# Patient Record
Sex: FEMALE | Race: WHITE | Employment: OTHER | URBAN - METROPOLITAN AREA
[De-identification: names, ages, dates, MRNs, and addresses within clinical notes are randomized per-mention and may not be internally consistent; named-entity substitution may affect disease eponyms.]

---

## 2019-09-25 ENCOUNTER — APPOINTMENT (OUTPATIENT)
Dept: GENERAL RADIOLOGY | Age: 74
DRG: 418 | End: 2019-09-25
Payer: MEDICARE

## 2019-09-25 ENCOUNTER — APPOINTMENT (OUTPATIENT)
Dept: CT IMAGING | Age: 74
DRG: 418 | End: 2019-09-25
Payer: MEDICARE

## 2019-09-25 ENCOUNTER — HOSPITAL ENCOUNTER (INPATIENT)
Age: 74
LOS: 9 days | Discharge: NURSING, REHAB OR LONG-TERM CARE | DRG: 418 | End: 2019-10-04
Attending: EMERGENCY MEDICINE | Admitting: INTERNAL MEDICINE
Payer: MEDICARE

## 2019-09-25 DIAGNOSIS — N17.9 ACUTE KIDNEY INJURY (HCC): ICD-10-CM

## 2019-09-25 DIAGNOSIS — R52 PAIN: ICD-10-CM

## 2019-09-25 DIAGNOSIS — R00.0 TACHYCARDIA: ICD-10-CM

## 2019-09-25 DIAGNOSIS — K56.7 ILEUS (HCC): ICD-10-CM

## 2019-09-25 DIAGNOSIS — R10.11 ABDOMINAL PAIN, RIGHT UPPER QUADRANT: ICD-10-CM

## 2019-09-25 DIAGNOSIS — R77.8 ELEVATED TROPONIN: ICD-10-CM

## 2019-09-25 DIAGNOSIS — I48.91 NEW ONSET ATRIAL FIBRILLATION (HCC): ICD-10-CM

## 2019-09-25 DIAGNOSIS — E87.1 HYPONATREMIA: ICD-10-CM

## 2019-09-25 DIAGNOSIS — R79.89 ELEVATED BRAIN NATRIURETIC PEPTIDE (BNP) LEVEL: Primary | ICD-10-CM

## 2019-09-25 DIAGNOSIS — I48.91 RAPID ATRIAL FIBRILLATION (HCC): ICD-10-CM

## 2019-09-25 LAB
ALBUMIN SERPL-MCNC: 3 GM/DL (ref 3.4–5)
ALP BLD-CCNC: 173 IU/L (ref 40–129)
ALT SERPL-CCNC: 40 U/L (ref 10–40)
ANION GAP SERPL CALCULATED.3IONS-SCNC: 16 MMOL/L (ref 4–16)
AST SERPL-CCNC: 20 IU/L (ref 15–37)
BACTERIA: NEGATIVE /HPF
BASOPHILS ABSOLUTE: 0 K/CU MM
BASOPHILS RELATIVE PERCENT: 0.2 % (ref 0–1)
BILIRUB SERPL-MCNC: 0.8 MG/DL (ref 0–1)
BILIRUBIN URINE: NEGATIVE MG/DL
BLOOD, URINE: ABNORMAL
BUN BLDV-MCNC: 52 MG/DL (ref 6–23)
CALCIUM SERPL-MCNC: 8.4 MG/DL (ref 8.3–10.6)
CHLORIDE BLD-SCNC: 88 MMOL/L (ref 99–110)
CLARITY: ABNORMAL
CO2: 19 MMOL/L (ref 21–32)
COLOR: ABNORMAL
CREAT SERPL-MCNC: 1.8 MG/DL (ref 0.6–1.1)
DIFFERENTIAL TYPE: ABNORMAL
EOSINOPHILS ABSOLUTE: 0 K/CU MM
EOSINOPHILS RELATIVE PERCENT: 0.1 % (ref 0–3)
GFR AFRICAN AMERICAN: 33 ML/MIN/1.73M2
GFR NON-AFRICAN AMERICAN: 28 ML/MIN/1.73M2
GLUCOSE BLD-MCNC: 56 MG/DL (ref 70–99)
GLUCOSE BLD-MCNC: 69 MG/DL (ref 70–99)
GLUCOSE, URINE: NEGATIVE MG/DL
HCT VFR BLD CALC: 31.3 % (ref 37–47)
HEMOGLOBIN: 9.6 GM/DL (ref 12.5–16)
HYALINE CASTS: 5 /LPF
IMMATURE NEUTROPHIL %: 0.5 % (ref 0–0.43)
KETONES, URINE: NEGATIVE MG/DL
LACTATE: 1.2 MMOL/L (ref 0.4–2)
LEUKOCYTE ESTERASE, URINE: ABNORMAL
LYMPHOCYTES ABSOLUTE: 0.7 K/CU MM
LYMPHOCYTES RELATIVE PERCENT: 4.5 % (ref 24–44)
MCH RBC QN AUTO: 23.8 PG (ref 27–31)
MCHC RBC AUTO-ENTMCNC: 30.7 % (ref 32–36)
MCV RBC AUTO: 77.5 FL (ref 78–100)
MONOCYTES ABSOLUTE: 0.8 K/CU MM
MONOCYTES RELATIVE PERCENT: 4.9 % (ref 0–4)
MUCUS: ABNORMAL HPF
NITRITE URINE, QUANTITATIVE: NEGATIVE
NUCLEATED RBC %: 0 %
PDW BLD-RTO: 17.2 % (ref 11.7–14.9)
PH, URINE: 5 (ref 5–8)
PLATELET # BLD: 474 K/CU MM (ref 140–440)
PMV BLD AUTO: 9.1 FL (ref 7.5–11.1)
POTASSIUM SERPL-SCNC: 3.6 MMOL/L (ref 3.5–5.1)
PRO-BNP: 4398 PG/ML
PROTEIN UA: NEGATIVE MG/DL
RBC # BLD: 4.04 M/CU MM (ref 4.2–5.4)
RBC URINE: 6 /HPF (ref 0–6)
SEGMENTED NEUTROPHILS ABSOLUTE COUNT: 14.5 K/CU MM
SEGMENTED NEUTROPHILS RELATIVE PERCENT: 89.8 % (ref 36–66)
SODIUM BLD-SCNC: 123 MMOL/L (ref 135–145)
SPECIFIC GRAVITY UA: 1.02 (ref 1–1.03)
SQUAMOUS EPITHELIAL: 3 /HPF
TOTAL IMMATURE NEUTOROPHIL: 0.08 K/CU MM
TOTAL NUCLEATED RBC: 0 K/CU MM
TOTAL PROTEIN: 7.2 GM/DL (ref 6.4–8.2)
TRICHOMONAS: ABNORMAL /HPF
TROPONIN T: 0.02 NG/ML
UROBILINOGEN, URINE: NORMAL MG/DL (ref 0.2–1)
WBC # BLD: 16.2 K/CU MM (ref 4–10.5)
WBC CLUMP: ABNORMAL /HPF
WBC UA: 40 /HPF (ref 0–5)

## 2019-09-25 PROCEDURE — 83880 ASSAY OF NATRIURETIC PEPTIDE: CPT

## 2019-09-25 PROCEDURE — 2500000003 HC RX 250 WO HCPCS: Performed by: EMERGENCY MEDICINE

## 2019-09-25 PROCEDURE — 36415 COLL VENOUS BLD VENIPUNCTURE: CPT

## 2019-09-25 PROCEDURE — 85025 COMPLETE CBC W/AUTO DIFF WBC: CPT

## 2019-09-25 PROCEDURE — 2140000000 HC CCU INTERMEDIATE R&B

## 2019-09-25 PROCEDURE — 84484 ASSAY OF TROPONIN QUANT: CPT

## 2019-09-25 PROCEDURE — 87040 BLOOD CULTURE FOR BACTERIA: CPT

## 2019-09-25 PROCEDURE — 99291 CRITICAL CARE FIRST HOUR: CPT

## 2019-09-25 PROCEDURE — 96365 THER/PROPH/DIAG IV INF INIT: CPT

## 2019-09-25 PROCEDURE — 81001 URINALYSIS AUTO W/SCOPE: CPT

## 2019-09-25 PROCEDURE — 96375 TX/PRO/DX INJ NEW DRUG ADDON: CPT

## 2019-09-25 PROCEDURE — 93005 ELECTROCARDIOGRAM TRACING: CPT | Performed by: EMERGENCY MEDICINE

## 2019-09-25 PROCEDURE — 2580000003 HC RX 258: Performed by: EMERGENCY MEDICINE

## 2019-09-25 PROCEDURE — 80053 COMPREHEN METABOLIC PANEL: CPT

## 2019-09-25 PROCEDURE — 71250 CT THORAX DX C-: CPT

## 2019-09-25 PROCEDURE — 83605 ASSAY OF LACTIC ACID: CPT

## 2019-09-25 PROCEDURE — 2580000003 HC RX 258: Performed by: INTERNAL MEDICINE

## 2019-09-25 PROCEDURE — 71045 X-RAY EXAM CHEST 1 VIEW: CPT

## 2019-09-25 PROCEDURE — 6360000002 HC RX W HCPCS: Performed by: EMERGENCY MEDICINE

## 2019-09-25 PROCEDURE — 74176 CT ABD & PELVIS W/O CONTRAST: CPT

## 2019-09-25 RX ORDER — DILTIAZEM HYDROCHLORIDE 5 MG/ML
10 INJECTION INTRAVENOUS ONCE
Status: DISCONTINUED | OUTPATIENT
Start: 2019-09-25 | End: 2019-09-25 | Stop reason: SDUPTHER

## 2019-09-25 RX ORDER — FENTANYL CITRATE 50 UG/ML
25 INJECTION, SOLUTION INTRAMUSCULAR; INTRAVENOUS ONCE
Status: COMPLETED | OUTPATIENT
Start: 2019-09-25 | End: 2019-09-25

## 2019-09-25 RX ORDER — SODIUM CHLORIDE 0.9 % (FLUSH) 0.9 %
10 SYRINGE (ML) INJECTION EVERY 12 HOURS SCHEDULED
Status: DISCONTINUED | OUTPATIENT
Start: 2019-09-25 | End: 2019-10-05 | Stop reason: HOSPADM

## 2019-09-25 RX ORDER — 0.9 % SODIUM CHLORIDE 0.9 %
500 INTRAVENOUS SOLUTION INTRAVENOUS ONCE
Status: COMPLETED | OUTPATIENT
Start: 2019-09-25 | End: 2019-09-25

## 2019-09-25 RX ORDER — ACETAMINOPHEN 325 MG/1
650 TABLET ORAL EVERY 4 HOURS PRN
Status: DISCONTINUED | OUTPATIENT
Start: 2019-09-25 | End: 2019-10-05 | Stop reason: HOSPADM

## 2019-09-25 RX ORDER — METOPROLOL SUCCINATE 25 MG/1
25 TABLET, EXTENDED RELEASE ORAL DAILY
Status: DISCONTINUED | OUTPATIENT
Start: 2019-09-26 | End: 2019-10-05 | Stop reason: HOSPADM

## 2019-09-25 RX ORDER — HYDROMORPHONE HCL 110MG/55ML
1 PATIENT CONTROLLED ANALGESIA SYRINGE INTRAVENOUS EVERY 4 HOURS PRN
Status: DISCONTINUED | OUTPATIENT
Start: 2019-09-25 | End: 2019-09-28

## 2019-09-25 RX ORDER — SODIUM CHLORIDE 0.9 % (FLUSH) 0.9 %
10 SYRINGE (ML) INJECTION PRN
Status: DISCONTINUED | OUTPATIENT
Start: 2019-09-25 | End: 2019-10-05 | Stop reason: HOSPADM

## 2019-09-25 RX ORDER — SODIUM CHLORIDE 9 MG/ML
INJECTION, SOLUTION INTRAVENOUS CONTINUOUS
Status: DISCONTINUED | OUTPATIENT
Start: 2019-09-25 | End: 2019-10-02

## 2019-09-25 RX ORDER — DILTIAZEM HYDROCHLORIDE 5 MG/ML
10 INJECTION INTRAVENOUS ONCE
Status: COMPLETED | OUTPATIENT
Start: 2019-09-25 | End: 2019-09-25

## 2019-09-25 RX ADMIN — DILTIAZEM HYDROCHLORIDE 10 MG: 5 INJECTION INTRAVENOUS at 21:24

## 2019-09-25 RX ADMIN — PIPERACILLIN AND TAZOBACTAM 3.38 G: 3; .375 INJECTION, POWDER, LYOPHILIZED, FOR SOLUTION INTRAVENOUS at 21:00

## 2019-09-25 RX ADMIN — SODIUM CHLORIDE: 9 INJECTION, SOLUTION INTRAVENOUS at 23:33

## 2019-09-25 RX ADMIN — SODIUM CHLORIDE 500 ML: 9 INJECTION, SOLUTION INTRAVENOUS at 20:02

## 2019-09-25 RX ADMIN — DILTIAZEM HYDROCHLORIDE 5 MG/HR: 5 INJECTION INTRAVENOUS at 21:24

## 2019-09-25 RX ADMIN — FENTANYL CITRATE 25 MCG: 50 INJECTION INTRAMUSCULAR; INTRAVENOUS at 21:00

## 2019-09-25 ASSESSMENT — PAIN DESCRIPTION - PAIN TYPE: TYPE: ACUTE PAIN

## 2019-09-25 ASSESSMENT — PAIN SCALES - GENERAL
PAINLEVEL_OUTOF10: 8
PAINLEVEL_OUTOF10: 8

## 2019-09-25 ASSESSMENT — PAIN DESCRIPTION - ONSET: ONSET: ON-GOING

## 2019-09-25 ASSESSMENT — PAIN DESCRIPTION - PROGRESSION: CLINICAL_PROGRESSION: NOT CHANGED

## 2019-09-25 ASSESSMENT — PAIN DESCRIPTION - FREQUENCY: FREQUENCY: CONTINUOUS

## 2019-09-25 ASSESSMENT — PAIN DESCRIPTION - DESCRIPTORS: DESCRIPTORS: SHARP

## 2019-09-25 ASSESSMENT — PAIN DESCRIPTION - ORIENTATION: ORIENTATION: RIGHT

## 2019-09-25 ASSESSMENT — PAIN DESCRIPTION - LOCATION: LOCATION: ABDOMEN;FLANK

## 2019-09-26 ENCOUNTER — APPOINTMENT (OUTPATIENT)
Dept: NUCLEAR MEDICINE | Age: 74
DRG: 418 | End: 2019-09-26
Payer: MEDICARE

## 2019-09-26 ENCOUNTER — APPOINTMENT (OUTPATIENT)
Dept: ULTRASOUND IMAGING | Age: 74
DRG: 418 | End: 2019-09-26
Payer: MEDICARE

## 2019-09-26 LAB
ALBUMIN SERPL-MCNC: 2.8 GM/DL (ref 3.4–5)
ALP BLD-CCNC: 157 IU/L (ref 40–128)
ALT SERPL-CCNC: 29 U/L (ref 10–40)
AMYLASE: 31 U/L (ref 25–115)
ANION GAP SERPL CALCULATED.3IONS-SCNC: 14 MMOL/L (ref 4–16)
AST SERPL-CCNC: 14 IU/L (ref 15–37)
BILIRUB SERPL-MCNC: 0.7 MG/DL (ref 0–1)
BUN BLDV-MCNC: 55 MG/DL (ref 6–23)
CALCIUM SERPL-MCNC: 8.2 MG/DL (ref 8.3–10.6)
CHLORIDE BLD-SCNC: 94 MMOL/L (ref 99–110)
CO2: 21 MMOL/L (ref 21–32)
CREAT SERPL-MCNC: 1.9 MG/DL (ref 0.6–1.1)
GFR AFRICAN AMERICAN: 31 ML/MIN/1.73M2
GFR NON-AFRICAN AMERICAN: 26 ML/MIN/1.73M2
GLUCOSE BLD-MCNC: 86 MG/DL (ref 70–99)
GLUCOSE BLD-MCNC: 88 MG/DL (ref 70–99)
GLUCOSE BLD-MCNC: 93 MG/DL (ref 70–99)
GLUCOSE BLD-MCNC: 97 MG/DL (ref 70–99)
HCT VFR BLD CALC: 27.8 % (ref 37–47)
HEMOGLOBIN: 8.4 GM/DL (ref 12.5–16)
LIPASE: 24 IU/L (ref 13–60)
LV EF: 58 %
LV EF: 60 %
LVEF MODALITY: NORMAL
LVEF MODALITY: NORMAL
MCH RBC QN AUTO: 23.7 PG (ref 27–31)
MCHC RBC AUTO-ENTMCNC: 30.2 % (ref 32–36)
MCV RBC AUTO: 78.5 FL (ref 78–100)
PDW BLD-RTO: 17.2 % (ref 11.7–14.9)
PLATELET # BLD: 439 K/CU MM (ref 140–440)
PMV BLD AUTO: 9 FL (ref 7.5–11.1)
POTASSIUM SERPL-SCNC: 4.7 MMOL/L (ref 3.5–5.1)
RBC # BLD: 3.54 M/CU MM (ref 4.2–5.4)
SODIUM BLD-SCNC: 129 MMOL/L (ref 135–145)
T4 FREE: 0.87 NG/DL (ref 0.9–1.8)
TOTAL PROTEIN: 5.8 GM/DL (ref 6.4–8.2)
TROPONIN T: 0.02 NG/ML
TSH HIGH SENSITIVITY: 9.96 UIU/ML (ref 0.27–4.2)
WBC # BLD: 15.2 K/CU MM (ref 4–10.5)

## 2019-09-26 PROCEDURE — 80053 COMPREHEN METABOLIC PANEL: CPT

## 2019-09-26 PROCEDURE — 36415 COLL VENOUS BLD VENIPUNCTURE: CPT

## 2019-09-26 PROCEDURE — 3430000000 HC RX DIAGNOSTIC RADIOPHARMACEUTICAL: Performed by: INTERNAL MEDICINE

## 2019-09-26 PROCEDURE — 85027 COMPLETE CBC AUTOMATED: CPT

## 2019-09-26 PROCEDURE — 51702 INSERT TEMP BLADDER CATH: CPT

## 2019-09-26 PROCEDURE — 84439 ASSAY OF FREE THYROXINE: CPT

## 2019-09-26 PROCEDURE — 6360000002 HC RX W HCPCS: Performed by: INTERNAL MEDICINE

## 2019-09-26 PROCEDURE — 93010 ELECTROCARDIOGRAM REPORT: CPT | Performed by: INTERNAL MEDICINE

## 2019-09-26 PROCEDURE — 93017 CV STRESS TEST TRACING ONLY: CPT

## 2019-09-26 PROCEDURE — 83690 ASSAY OF LIPASE: CPT

## 2019-09-26 PROCEDURE — 93306 TTE W/DOPPLER COMPLETE: CPT

## 2019-09-26 PROCEDURE — 84443 ASSAY THYROID STIM HORMONE: CPT

## 2019-09-26 PROCEDURE — A9500 TC99M SESTAMIBI: HCPCS | Performed by: INTERNAL MEDICINE

## 2019-09-26 PROCEDURE — 2140000000 HC CCU INTERMEDIATE R&B

## 2019-09-26 PROCEDURE — 84484 ASSAY OF TROPONIN QUANT: CPT

## 2019-09-26 PROCEDURE — 82962 GLUCOSE BLOOD TEST: CPT

## 2019-09-26 PROCEDURE — 2580000003 HC RX 258: Performed by: INTERNAL MEDICINE

## 2019-09-26 PROCEDURE — 6370000000 HC RX 637 (ALT 250 FOR IP): Performed by: INTERNAL MEDICINE

## 2019-09-26 PROCEDURE — 82150 ASSAY OF AMYLASE: CPT

## 2019-09-26 PROCEDURE — 78452 HT MUSCLE IMAGE SPECT MULT: CPT

## 2019-09-26 PROCEDURE — 76705 ECHO EXAM OF ABDOMEN: CPT

## 2019-09-26 PROCEDURE — 99222 1ST HOSP IP/OBS MODERATE 55: CPT | Performed by: INTERNAL MEDICINE

## 2019-09-26 RX ORDER — NAPROXEN 500 MG/1
500 TABLET ORAL 2 TIMES DAILY
Status: ON HOLD | COMMUNITY
End: 2019-10-04 | Stop reason: HOSPADM

## 2019-09-26 RX ORDER — NICOTINE POLACRILEX 4 MG
15 LOZENGE BUCCAL PRN
Status: DISCONTINUED | OUTPATIENT
Start: 2019-09-26 | End: 2019-09-28 | Stop reason: SDUPTHER

## 2019-09-26 RX ORDER — DIGOXIN 0.25 MG/ML
250 INJECTION INTRAMUSCULAR; INTRAVENOUS EVERY 6 HOURS
Status: DISPENSED | OUTPATIENT
Start: 2019-09-26 | End: 2019-09-27

## 2019-09-26 RX ORDER — PRAVASTATIN SODIUM 40 MG
40 TABLET ORAL DAILY
COMMUNITY
End: 2021-07-12 | Stop reason: ALTCHOICE

## 2019-09-26 RX ORDER — FERROUS SULFATE 325(65) MG
325 TABLET ORAL
COMMUNITY

## 2019-09-26 RX ORDER — DEXTROSE MONOHYDRATE 50 MG/ML
100 INJECTION, SOLUTION INTRAVENOUS PRN
Status: DISCONTINUED | OUTPATIENT
Start: 2019-09-26 | End: 2019-09-28 | Stop reason: SDUPTHER

## 2019-09-26 RX ORDER — DEXTROSE MONOHYDRATE 25 G/50ML
12.5 INJECTION, SOLUTION INTRAVENOUS PRN
Status: DISCONTINUED | OUTPATIENT
Start: 2019-09-26 | End: 2019-09-28 | Stop reason: SDUPTHER

## 2019-09-26 RX ADMIN — DIGOXIN 250 MCG: 0.25 INJECTION INTRAMUSCULAR; INTRAVENOUS at 23:58

## 2019-09-26 RX ADMIN — PIPERACILLIN AND TAZOBACTAM 3.38 G: 3; .375 INJECTION, POWDER, LYOPHILIZED, FOR SOLUTION INTRAVENOUS at 03:54

## 2019-09-26 RX ADMIN — Medication 30 MILLICURIE: at 11:45

## 2019-09-26 RX ADMIN — HYDROMORPHONE HYDROCHLORIDE 1 MG: 2 INJECTION INTRAMUSCULAR; INTRAVENOUS; SUBCUTANEOUS at 11:59

## 2019-09-26 RX ADMIN — SODIUM CHLORIDE: 9 INJECTION, SOLUTION INTRAVENOUS at 22:55

## 2019-09-26 RX ADMIN — DIGOXIN 250 MCG: 0.25 INJECTION INTRAMUSCULAR; INTRAVENOUS at 15:08

## 2019-09-26 RX ADMIN — METOPROLOL SUCCINATE 25 MG: 25 TABLET, EXTENDED RELEASE ORAL at 13:51

## 2019-09-26 RX ADMIN — Medication 10 MILLICURIE: at 10:35

## 2019-09-26 RX ADMIN — ENOXAPARIN SODIUM 40 MG: 40 INJECTION SUBCUTANEOUS at 13:51

## 2019-09-26 RX ADMIN — REGADENOSON 0.4 MG: 0.08 INJECTION, SOLUTION INTRAVENOUS at 11:41

## 2019-09-26 RX ADMIN — PIPERACILLIN AND TAZOBACTAM 3.38 G: 3; .375 INJECTION, POWDER, LYOPHILIZED, FOR SOLUTION INTRAVENOUS at 22:47

## 2019-09-26 RX ADMIN — HYDROMORPHONE HYDROCHLORIDE 1 MG: 2 INJECTION INTRAMUSCULAR; INTRAVENOUS; SUBCUTANEOUS at 22:54

## 2019-09-26 RX ADMIN — PIPERACILLIN AND TAZOBACTAM 3.38 G: 3; .375 INJECTION, POWDER, LYOPHILIZED, FOR SOLUTION INTRAVENOUS at 13:51

## 2019-09-26 RX ADMIN — HYDROMORPHONE HYDROCHLORIDE 1 MG: 2 INJECTION INTRAMUSCULAR; INTRAVENOUS; SUBCUTANEOUS at 02:30

## 2019-09-26 ASSESSMENT — PAIN DESCRIPTION - ONSET: ONSET: ON-GOING

## 2019-09-26 ASSESSMENT — PAIN DESCRIPTION - FREQUENCY: FREQUENCY: CONTINUOUS

## 2019-09-26 ASSESSMENT — PAIN DESCRIPTION - ORIENTATION: ORIENTATION: RIGHT

## 2019-09-26 ASSESSMENT — PAIN SCALES - GENERAL
PAINLEVEL_OUTOF10: 8
PAINLEVEL_OUTOF10: 5
PAINLEVEL_OUTOF10: 8
PAINLEVEL_OUTOF10: 8

## 2019-09-26 ASSESSMENT — PAIN DESCRIPTION - LOCATION: LOCATION: ABDOMEN;FLANK

## 2019-09-26 ASSESSMENT — PAIN DESCRIPTION - DESCRIPTORS: DESCRIPTORS: SHARP

## 2019-09-26 ASSESSMENT — PAIN DESCRIPTION - PAIN TYPE: TYPE: ACUTE PAIN

## 2019-09-27 ENCOUNTER — ANESTHESIA EVENT (OUTPATIENT)
Dept: OPERATING ROOM | Age: 74
DRG: 418 | End: 2019-09-27
Payer: MEDICARE

## 2019-09-27 LAB
ALBUMIN SERPL-MCNC: 2.5 GM/DL (ref 3.4–5)
ALP BLD-CCNC: 158 IU/L (ref 40–128)
ALT SERPL-CCNC: 22 U/L (ref 10–40)
ANION GAP SERPL CALCULATED.3IONS-SCNC: 13 MMOL/L (ref 4–16)
AST SERPL-CCNC: 17 IU/L (ref 15–37)
BILIRUB SERPL-MCNC: 0.5 MG/DL (ref 0–1)
BUN BLDV-MCNC: 50 MG/DL (ref 6–23)
CALCIUM SERPL-MCNC: 7.8 MG/DL (ref 8.3–10.6)
CHLORIDE BLD-SCNC: 99 MMOL/L (ref 99–110)
CO2: 18 MMOL/L (ref 21–32)
CREAT SERPL-MCNC: 1.7 MG/DL (ref 0.6–1.1)
GFR AFRICAN AMERICAN: 36 ML/MIN/1.73M2
GFR NON-AFRICAN AMERICAN: 30 ML/MIN/1.73M2
GLUCOSE BLD-MCNC: 75 MG/DL (ref 70–99)
GLUCOSE BLD-MCNC: 76 MG/DL (ref 70–99)
HCT VFR BLD CALC: 27.5 % (ref 37–47)
HEMOGLOBIN: 8.2 GM/DL (ref 12.5–16)
MCH RBC QN AUTO: 23.6 PG (ref 27–31)
MCHC RBC AUTO-ENTMCNC: 29.8 % (ref 32–36)
MCV RBC AUTO: 79 FL (ref 78–100)
PDW BLD-RTO: 17.3 % (ref 11.7–14.9)
PLATELET # BLD: 385 K/CU MM (ref 140–440)
PMV BLD AUTO: 9 FL (ref 7.5–11.1)
POTASSIUM SERPL-SCNC: 4.3 MMOL/L (ref 3.5–5.1)
RBC # BLD: 3.48 M/CU MM (ref 4.2–5.4)
SODIUM BLD-SCNC: 130 MMOL/L (ref 135–145)
TOTAL PROTEIN: 5.4 GM/DL (ref 6.4–8.2)
WBC # BLD: 13.7 K/CU MM (ref 4–10.5)

## 2019-09-27 PROCEDURE — 6360000002 HC RX W HCPCS: Performed by: INTERNAL MEDICINE

## 2019-09-27 PROCEDURE — 6370000000 HC RX 637 (ALT 250 FOR IP): Performed by: INTERNAL MEDICINE

## 2019-09-27 PROCEDURE — 99232 SBSQ HOSP IP/OBS MODERATE 35: CPT | Performed by: INTERNAL MEDICINE

## 2019-09-27 PROCEDURE — APPSS30 APP SPLIT SHARED TIME 16-30 MINUTES: Performed by: NURSE PRACTITIONER

## 2019-09-27 PROCEDURE — 80053 COMPREHEN METABOLIC PANEL: CPT

## 2019-09-27 PROCEDURE — 82962 GLUCOSE BLOOD TEST: CPT

## 2019-09-27 PROCEDURE — 94761 N-INVAS EAR/PLS OXIMETRY MLT: CPT

## 2019-09-27 PROCEDURE — 2140000000 HC CCU INTERMEDIATE R&B

## 2019-09-27 PROCEDURE — 2580000003 HC RX 258: Performed by: INTERNAL MEDICINE

## 2019-09-27 PROCEDURE — 85027 COMPLETE CBC AUTOMATED: CPT

## 2019-09-27 PROCEDURE — 36415 COLL VENOUS BLD VENIPUNCTURE: CPT

## 2019-09-27 RX ORDER — LEVOTHYROXINE SODIUM 0.05 MG/1
50 TABLET ORAL DAILY
Status: DISCONTINUED | OUTPATIENT
Start: 2019-09-28 | End: 2019-10-05 | Stop reason: HOSPADM

## 2019-09-27 RX ADMIN — ENOXAPARIN SODIUM 40 MG: 40 INJECTION SUBCUTANEOUS at 11:31

## 2019-09-27 RX ADMIN — Medication 10 ML: at 11:32

## 2019-09-27 RX ADMIN — PIPERACILLIN AND TAZOBACTAM 3.38 G: 3; .375 INJECTION, POWDER, LYOPHILIZED, FOR SOLUTION INTRAVENOUS at 23:09

## 2019-09-27 RX ADMIN — METOPROLOL SUCCINATE 25 MG: 25 TABLET, EXTENDED RELEASE ORAL at 11:48

## 2019-09-27 RX ADMIN — Medication 10 ML: at 11:13

## 2019-09-27 RX ADMIN — PIPERACILLIN AND TAZOBACTAM 3.38 G: 3; .375 INJECTION, POWDER, LYOPHILIZED, FOR SOLUTION INTRAVENOUS at 06:41

## 2019-09-27 RX ADMIN — HYDROMORPHONE HYDROCHLORIDE 1 MG: 2 INJECTION INTRAMUSCULAR; INTRAVENOUS; SUBCUTANEOUS at 06:50

## 2019-09-27 RX ADMIN — PIPERACILLIN AND TAZOBACTAM 3.38 G: 3; .375 INJECTION, POWDER, LYOPHILIZED, FOR SOLUTION INTRAVENOUS at 16:59

## 2019-09-27 RX ADMIN — DIGOXIN 250 MCG: 0.25 INJECTION INTRAMUSCULAR; INTRAVENOUS at 06:40

## 2019-09-27 RX ADMIN — HYDROMORPHONE HYDROCHLORIDE 1 MG: 2 INJECTION INTRAMUSCULAR; INTRAVENOUS; SUBCUTANEOUS at 11:32

## 2019-09-27 RX ADMIN — HYDROMORPHONE HYDROCHLORIDE 1 MG: 2 INJECTION INTRAMUSCULAR; INTRAVENOUS; SUBCUTANEOUS at 23:09

## 2019-09-27 ASSESSMENT — PAIN SCALES - GENERAL
PAINLEVEL_OUTOF10: 0
PAINLEVEL_OUTOF10: 5
PAINLEVEL_OUTOF10: 6
PAINLEVEL_OUTOF10: 6
PAINLEVEL_OUTOF10: 8
PAINLEVEL_OUTOF10: 7
PAINLEVEL_OUTOF10: 8

## 2019-09-27 ASSESSMENT — PAIN DESCRIPTION - PROGRESSION
CLINICAL_PROGRESSION: NOT CHANGED

## 2019-09-27 ASSESSMENT — PAIN - FUNCTIONAL ASSESSMENT: PAIN_FUNCTIONAL_ASSESSMENT: PREVENTS OR INTERFERES SOME ACTIVE ACTIVITIES AND ADLS

## 2019-09-27 ASSESSMENT — PAIN DESCRIPTION - FREQUENCY
FREQUENCY: CONTINUOUS
FREQUENCY: CONTINUOUS

## 2019-09-27 ASSESSMENT — PAIN DESCRIPTION - PAIN TYPE
TYPE: ACUTE PAIN
TYPE: ACUTE PAIN

## 2019-09-27 ASSESSMENT — PAIN DESCRIPTION - DESCRIPTORS
DESCRIPTORS: SHARP
DESCRIPTORS: SHARP

## 2019-09-27 ASSESSMENT — PAIN DESCRIPTION - ONSET
ONSET: ON-GOING
ONSET: ON-GOING

## 2019-09-27 ASSESSMENT — PAIN DESCRIPTION - LOCATION
LOCATION: ABDOMEN
LOCATION: ABDOMEN

## 2019-09-27 ASSESSMENT — PAIN DESCRIPTION - ORIENTATION
ORIENTATION: RIGHT
ORIENTATION: RIGHT

## 2019-09-28 ENCOUNTER — ANESTHESIA (OUTPATIENT)
Dept: OPERATING ROOM | Age: 74
DRG: 418 | End: 2019-09-28
Payer: MEDICARE

## 2019-09-28 VITALS
DIASTOLIC BLOOD PRESSURE: 55 MMHG | RESPIRATION RATE: 29 BRPM | OXYGEN SATURATION: 100 % | TEMPERATURE: 97.1 F | SYSTOLIC BLOOD PRESSURE: 148 MMHG

## 2019-09-28 LAB
ABO/RH: NORMAL
ANION GAP SERPL CALCULATED.3IONS-SCNC: 14 MMOL/L (ref 4–16)
ANTIBODY SCREEN: NEGATIVE
BUN BLDV-MCNC: 42 MG/DL (ref 6–23)
CALCIUM SERPL-MCNC: 8.1 MG/DL (ref 8.3–10.6)
CHLORIDE BLD-SCNC: 100 MMOL/L (ref 99–110)
CO2: 17 MMOL/L (ref 21–32)
CREAT SERPL-MCNC: 1.4 MG/DL (ref 0.6–1.1)
GFR AFRICAN AMERICAN: 45 ML/MIN/1.73M2
GFR NON-AFRICAN AMERICAN: 37 ML/MIN/1.73M2
GLUCOSE BLD-MCNC: 111 MG/DL (ref 70–99)
GLUCOSE BLD-MCNC: 128 MG/DL (ref 70–99)
GLUCOSE BLD-MCNC: 135 MG/DL (ref 70–99)
GLUCOSE BLD-MCNC: 140 MG/DL (ref 70–99)
GLUCOSE BLD-MCNC: 149 MG/DL (ref 70–99)
GLUCOSE BLD-MCNC: 97 MG/DL (ref 70–99)
HCT VFR BLD CALC: 28.5 % (ref 37–47)
HEMOGLOBIN: 8.6 GM/DL (ref 12.5–16)
MAGNESIUM: 2.3 MG/DL (ref 1.8–2.4)
MCH RBC QN AUTO: 23.7 PG (ref 27–31)
MCHC RBC AUTO-ENTMCNC: 30.2 % (ref 32–36)
MCV RBC AUTO: 78.5 FL (ref 78–100)
PDW BLD-RTO: 17.5 % (ref 11.7–14.9)
PHOSPHORUS: 3.6 MG/DL (ref 2.5–4.9)
PLATELET # BLD: 440 K/CU MM (ref 140–440)
PMV BLD AUTO: 8.9 FL (ref 7.5–11.1)
POTASSIUM SERPL-SCNC: 4.5 MMOL/L (ref 3.5–5.1)
RBC # BLD: 3.63 M/CU MM (ref 4.2–5.4)
SODIUM BLD-SCNC: 131 MMOL/L (ref 135–145)
WBC # BLD: 12.2 K/CU MM (ref 4–10.5)

## 2019-09-28 PROCEDURE — 36415 COLL VENOUS BLD VENIPUNCTURE: CPT

## 2019-09-28 PROCEDURE — 85027 COMPLETE CBC AUTOMATED: CPT

## 2019-09-28 PROCEDURE — 94761 N-INVAS EAR/PLS OXIMETRY MLT: CPT

## 2019-09-28 PROCEDURE — 87186 SC STD MICRODIL/AGAR DIL: CPT

## 2019-09-28 PROCEDURE — 2580000003 HC RX 258: Performed by: SURGERY

## 2019-09-28 PROCEDURE — 2580000003 HC RX 258: Performed by: INTERNAL MEDICINE

## 2019-09-28 PROCEDURE — 2500000003 HC RX 250 WO HCPCS: Performed by: SURGERY

## 2019-09-28 PROCEDURE — 94150 VITAL CAPACITY TEST: CPT

## 2019-09-28 PROCEDURE — 3700000000 HC ANESTHESIA ATTENDED CARE: Performed by: SURGERY

## 2019-09-28 PROCEDURE — 87205 SMEAR GRAM STAIN: CPT

## 2019-09-28 PROCEDURE — 86901 BLOOD TYPING SEROLOGIC RH(D): CPT

## 2019-09-28 PROCEDURE — 2700000000 HC OXYGEN THERAPY PER DAY

## 2019-09-28 PROCEDURE — 6360000002 HC RX W HCPCS: Performed by: SURGERY

## 2019-09-28 PROCEDURE — 84100 ASSAY OF PHOSPHORUS: CPT

## 2019-09-28 PROCEDURE — 88304 TISSUE EXAM BY PATHOLOGIST: CPT

## 2019-09-28 PROCEDURE — 6360000002 HC RX W HCPCS: Performed by: NURSE ANESTHETIST, CERTIFIED REGISTERED

## 2019-09-28 PROCEDURE — 7100000000 HC PACU RECOVERY - FIRST 15 MIN: Performed by: SURGERY

## 2019-09-28 PROCEDURE — 6360000002 HC RX W HCPCS: Performed by: ANESTHESIOLOGY

## 2019-09-28 PROCEDURE — 2140000000 HC CCU INTERMEDIATE R&B

## 2019-09-28 PROCEDURE — 2500000003 HC RX 250 WO HCPCS: Performed by: NURSE ANESTHETIST, CERTIFIED REGISTERED

## 2019-09-28 PROCEDURE — 87071 CULTURE AEROBIC QUANT OTHER: CPT

## 2019-09-28 PROCEDURE — 86850 RBC ANTIBODY SCREEN: CPT

## 2019-09-28 PROCEDURE — 82962 GLUCOSE BLOOD TEST: CPT

## 2019-09-28 PROCEDURE — 86900 BLOOD TYPING SEROLOGIC ABO: CPT

## 2019-09-28 PROCEDURE — 7100000001 HC PACU RECOVERY - ADDTL 15 MIN: Performed by: SURGERY

## 2019-09-28 PROCEDURE — 99232 SBSQ HOSP IP/OBS MODERATE 35: CPT | Performed by: INTERNAL MEDICINE

## 2019-09-28 PROCEDURE — 2709999900 HC NON-CHARGEABLE SUPPLY: Performed by: SURGERY

## 2019-09-28 PROCEDURE — 83735 ASSAY OF MAGNESIUM: CPT

## 2019-09-28 PROCEDURE — 3700000001 HC ADD 15 MINUTES (ANESTHESIA): Performed by: SURGERY

## 2019-09-28 PROCEDURE — 0FT44ZZ RESECTION OF GALLBLADDER, PERCUTANEOUS ENDOSCOPIC APPROACH: ICD-10-PCS | Performed by: SURGERY

## 2019-09-28 PROCEDURE — 6360000002 HC RX W HCPCS: Performed by: INTERNAL MEDICINE

## 2019-09-28 PROCEDURE — 3600000014 HC SURGERY LEVEL 4 ADDTL 15MIN: Performed by: SURGERY

## 2019-09-28 PROCEDURE — 87077 CULTURE AEROBIC IDENTIFY: CPT

## 2019-09-28 PROCEDURE — 2720000010 HC SURG SUPPLY STERILE: Performed by: SURGERY

## 2019-09-28 PROCEDURE — 80048 BASIC METABOLIC PNL TOTAL CA: CPT

## 2019-09-28 PROCEDURE — 87073 CULTURE BACTERIA ANAEROBIC: CPT

## 2019-09-28 PROCEDURE — 3600000004 HC SURGERY LEVEL 4 BASE: Performed by: SURGERY

## 2019-09-28 RX ORDER — SODIUM CHLORIDE 9 MG/ML
INJECTION, SOLUTION INTRAVENOUS CONTINUOUS
Status: ACTIVE | OUTPATIENT
Start: 2019-09-28 | End: 2019-09-28

## 2019-09-28 RX ORDER — PHENYLEPHRINE HYDROCHLORIDE 10 MG/ML
INJECTION INTRAVENOUS PRN
Status: DISCONTINUED | OUTPATIENT
Start: 2019-09-28 | End: 2019-09-28 | Stop reason: SDUPTHER

## 2019-09-28 RX ORDER — MORPHINE SULFATE 4 MG/ML
4 INJECTION, SOLUTION INTRAMUSCULAR; INTRAVENOUS
Status: DISCONTINUED | OUTPATIENT
Start: 2019-09-28 | End: 2019-09-30

## 2019-09-28 RX ORDER — FENTANYL CITRATE 50 UG/ML
INJECTION, SOLUTION INTRAMUSCULAR; INTRAVENOUS PRN
Status: DISCONTINUED | OUTPATIENT
Start: 2019-09-28 | End: 2019-09-28 | Stop reason: SDUPTHER

## 2019-09-28 RX ORDER — NEOSTIGMINE METHYLSULFATE 5 MG/5 ML
SYRINGE (ML) INTRAVENOUS PRN
Status: DISCONTINUED | OUTPATIENT
Start: 2019-09-28 | End: 2019-09-28 | Stop reason: SDUPTHER

## 2019-09-28 RX ORDER — ONDANSETRON 2 MG/ML
4 INJECTION INTRAMUSCULAR; INTRAVENOUS
Status: DISCONTINUED | OUTPATIENT
Start: 2019-09-28 | End: 2019-09-28 | Stop reason: HOSPADM

## 2019-09-28 RX ORDER — BUPIVACAINE HYDROCHLORIDE 5 MG/ML
INJECTION, SOLUTION EPIDURAL; INTRACAUDAL
Status: COMPLETED | OUTPATIENT
Start: 2019-09-28 | End: 2019-09-28

## 2019-09-28 RX ORDER — PROPOFOL 10 MG/ML
INJECTION, EMULSION INTRAVENOUS PRN
Status: DISCONTINUED | OUTPATIENT
Start: 2019-09-28 | End: 2019-09-28 | Stop reason: SDUPTHER

## 2019-09-28 RX ORDER — ACETAMINOPHEN 10 MG/ML
1000 INJECTION, SOLUTION INTRAVENOUS ONCE
Status: COMPLETED | OUTPATIENT
Start: 2019-09-28 | End: 2019-09-28

## 2019-09-28 RX ORDER — GLYCOPYRROLATE 1 MG/5 ML
SYRINGE (ML) INTRAVENOUS PRN
Status: DISCONTINUED | OUTPATIENT
Start: 2019-09-28 | End: 2019-09-28 | Stop reason: SDUPTHER

## 2019-09-28 RX ORDER — SODIUM CHLORIDE 9 MG/ML
INJECTION, SOLUTION INTRAVENOUS CONTINUOUS
Status: DISCONTINUED | OUTPATIENT
Start: 2019-09-28 | End: 2019-09-28

## 2019-09-28 RX ORDER — DEXTROSE MONOHYDRATE 25 G/50ML
12.5 INJECTION, SOLUTION INTRAVENOUS PRN
Status: DISCONTINUED | OUTPATIENT
Start: 2019-09-28 | End: 2019-10-05 | Stop reason: HOSPADM

## 2019-09-28 RX ORDER — NICOTINE POLACRILEX 4 MG
15 LOZENGE BUCCAL PRN
Status: DISCONTINUED | OUTPATIENT
Start: 2019-09-28 | End: 2019-10-05 | Stop reason: HOSPADM

## 2019-09-28 RX ORDER — DEXAMETHASONE SODIUM PHOSPHATE 4 MG/ML
INJECTION, SOLUTION INTRA-ARTICULAR; INTRALESIONAL; INTRAMUSCULAR; INTRAVENOUS; SOFT TISSUE PRN
Status: DISCONTINUED | OUTPATIENT
Start: 2019-09-28 | End: 2019-09-28 | Stop reason: SDUPTHER

## 2019-09-28 RX ORDER — 0.9 % SODIUM CHLORIDE 0.9 %
500 INTRAVENOUS SOLUTION INTRAVENOUS
Status: DISCONTINUED | OUTPATIENT
Start: 2019-09-28 | End: 2019-09-28 | Stop reason: HOSPADM

## 2019-09-28 RX ORDER — DEXTROSE MONOHYDRATE 50 MG/ML
100 INJECTION, SOLUTION INTRAVENOUS PRN
Status: DISCONTINUED | OUTPATIENT
Start: 2019-09-28 | End: 2019-10-05 | Stop reason: HOSPADM

## 2019-09-28 RX ORDER — HYDROMORPHONE HCL 110MG/55ML
0.5 PATIENT CONTROLLED ANALGESIA SYRINGE INTRAVENOUS EVERY 5 MIN PRN
Status: DISCONTINUED | OUTPATIENT
Start: 2019-09-28 | End: 2019-09-28 | Stop reason: HOSPADM

## 2019-09-28 RX ORDER — ROCURONIUM BROMIDE 10 MG/ML
INJECTION, SOLUTION INTRAVENOUS PRN
Status: DISCONTINUED | OUTPATIENT
Start: 2019-09-28 | End: 2019-09-28 | Stop reason: SDUPTHER

## 2019-09-28 RX ORDER — LABETALOL 20 MG/4 ML (5 MG/ML) INTRAVENOUS SYRINGE
5 EVERY 10 MIN PRN
Status: DISCONTINUED | OUTPATIENT
Start: 2019-09-28 | End: 2019-09-28 | Stop reason: HOSPADM

## 2019-09-28 RX ORDER — PROMETHAZINE HYDROCHLORIDE 25 MG/ML
6.25 INJECTION, SOLUTION INTRAMUSCULAR; INTRAVENOUS
Status: DISCONTINUED | OUTPATIENT
Start: 2019-09-28 | End: 2019-09-28 | Stop reason: HOSPADM

## 2019-09-28 RX ORDER — SODIUM CHLORIDE, SODIUM LACTATE, POTASSIUM CHLORIDE, CALCIUM CHLORIDE 600; 310; 30; 20 MG/100ML; MG/100ML; MG/100ML; MG/100ML
INJECTION, SOLUTION INTRAVENOUS
Status: DISCONTINUED
Start: 2019-09-28 | End: 2019-09-28

## 2019-09-28 RX ORDER — LIDOCAINE HYDROCHLORIDE 20 MG/ML
INJECTION, SOLUTION INTRAVENOUS PRN
Status: DISCONTINUED | OUTPATIENT
Start: 2019-09-28 | End: 2019-09-28 | Stop reason: SDUPTHER

## 2019-09-28 RX ORDER — ONDANSETRON 2 MG/ML
INJECTION INTRAMUSCULAR; INTRAVENOUS PRN
Status: DISCONTINUED | OUTPATIENT
Start: 2019-09-28 | End: 2019-09-28 | Stop reason: SDUPTHER

## 2019-09-28 RX ORDER — DIPHENHYDRAMINE HYDROCHLORIDE 50 MG/ML
12.5 INJECTION INTRAMUSCULAR; INTRAVENOUS
Status: DISCONTINUED | OUTPATIENT
Start: 2019-09-28 | End: 2019-09-28 | Stop reason: HOSPADM

## 2019-09-28 RX ORDER — MEPERIDINE HYDROCHLORIDE 25 MG/ML
12.5 INJECTION INTRAMUSCULAR; INTRAVENOUS; SUBCUTANEOUS EVERY 5 MIN PRN
Status: DISCONTINUED | OUTPATIENT
Start: 2019-09-28 | End: 2019-09-28 | Stop reason: HOSPADM

## 2019-09-28 RX ORDER — MORPHINE SULFATE 4 MG/ML
2 INJECTION, SOLUTION INTRAMUSCULAR; INTRAVENOUS EVERY 4 HOURS PRN
Status: DISCONTINUED | OUTPATIENT
Start: 2019-09-28 | End: 2019-09-30

## 2019-09-28 RX ORDER — FENTANYL CITRATE 50 UG/ML
50 INJECTION, SOLUTION INTRAMUSCULAR; INTRAVENOUS EVERY 5 MIN PRN
Status: DISCONTINUED | OUTPATIENT
Start: 2019-09-28 | End: 2019-09-28 | Stop reason: HOSPADM

## 2019-09-28 RX ORDER — SUCCINYLCHOLINE/SOD CL,ISO/PF 100 MG/5ML
SYRINGE (ML) INTRAVENOUS PRN
Status: DISCONTINUED | OUTPATIENT
Start: 2019-09-28 | End: 2019-09-28 | Stop reason: SDUPTHER

## 2019-09-28 RX ADMIN — DEXAMETHASONE SODIUM PHOSPHATE 8 MG: 4 INJECTION, SOLUTION INTRAMUSCULAR; INTRAVENOUS at 07:44

## 2019-09-28 RX ADMIN — Medication 0.4 MG: at 08:43

## 2019-09-28 RX ADMIN — MORPHINE SULFATE 2 MG: 4 INJECTION, SOLUTION INTRAMUSCULAR; INTRAVENOUS at 14:52

## 2019-09-28 RX ADMIN — ROCURONIUM BROMIDE 10 MG: 10 INJECTION INTRAVENOUS at 08:18

## 2019-09-28 RX ADMIN — PIPERACILLIN AND TAZOBACTAM 3.38 G: 3; .375 INJECTION, POWDER, LYOPHILIZED, FOR SOLUTION INTRAVENOUS at 06:33

## 2019-09-28 RX ADMIN — PHENYLEPHRINE HYDROCHLORIDE 100 MCG: 10 INJECTION INTRAVENOUS at 08:29

## 2019-09-28 RX ADMIN — SODIUM CHLORIDE: 9 INJECTION, SOLUTION INTRAVENOUS at 07:57

## 2019-09-28 RX ADMIN — Medication 4 MG: at 08:43

## 2019-09-28 RX ADMIN — PROPOFOL 120 MG: 10 INJECTION, EMULSION INTRAVENOUS at 07:40

## 2019-09-28 RX ADMIN — PIPERACILLIN AND TAZOBACTAM 3.38 G: 3; .375 INJECTION, POWDER, LYOPHILIZED, FOR SOLUTION INTRAVENOUS at 18:29

## 2019-09-28 RX ADMIN — SODIUM CHLORIDE: 9 INJECTION, SOLUTION INTRAVENOUS at 19:37

## 2019-09-28 RX ADMIN — ACETAMINOPHEN 1000 MG: 10 INJECTION, SOLUTION INTRAVENOUS at 09:46

## 2019-09-28 RX ADMIN — LIDOCAINE HYDROCHLORIDE 40 MG: 20 INJECTION, SOLUTION INTRAVENOUS at 08:44

## 2019-09-28 RX ADMIN — MORPHINE SULFATE 4 MG: 4 INJECTION, SOLUTION INTRAMUSCULAR; INTRAVENOUS at 21:08

## 2019-09-28 RX ADMIN — PIPERACILLIN AND TAZOBACTAM 3.38 G: 3; .375 INJECTION, POWDER, LYOPHILIZED, FOR SOLUTION INTRAVENOUS at 23:39

## 2019-09-28 RX ADMIN — Medication 100 MG: at 07:41

## 2019-09-28 RX ADMIN — LIDOCAINE HYDROCHLORIDE 60 MG: 20 INJECTION, SOLUTION INTRAVENOUS at 07:40

## 2019-09-28 RX ADMIN — FENTANYL CITRATE 50 MCG: 50 INJECTION INTRAMUSCULAR; INTRAVENOUS at 09:11

## 2019-09-28 RX ADMIN — Medication 10 ML: at 21:10

## 2019-09-28 RX ADMIN — HYDROMORPHONE HYDROCHLORIDE 1 MG: 2 INJECTION INTRAMUSCULAR; INTRAVENOUS; SUBCUTANEOUS at 05:30

## 2019-09-28 RX ADMIN — SODIUM CHLORIDE: 9 INJECTION, SOLUTION INTRAVENOUS at 23:39

## 2019-09-28 RX ADMIN — ONDANSETRON 4 MG: 2 INJECTION INTRAMUSCULAR; INTRAVENOUS at 07:44

## 2019-09-28 RX ADMIN — FENTANYL CITRATE 100 MCG: 50 INJECTION INTRAMUSCULAR; INTRAVENOUS at 07:40

## 2019-09-28 RX ADMIN — SODIUM CHLORIDE: 9 INJECTION, SOLUTION INTRAVENOUS at 09:46

## 2019-09-28 RX ADMIN — MORPHINE SULFATE 2 MG: 4 INJECTION, SOLUTION INTRAMUSCULAR; INTRAVENOUS at 18:42

## 2019-09-28 RX ADMIN — PHENYLEPHRINE HYDROCHLORIDE 100 MCG: 10 INJECTION INTRAVENOUS at 08:02

## 2019-09-28 ASSESSMENT — PULMONARY FUNCTION TESTS
PIF_VALUE: 6
PIF_VALUE: 27
PIF_VALUE: 8
PIF_VALUE: 28
PIF_VALUE: 11
PIF_VALUE: 27
PIF_VALUE: 17
PIF_VALUE: 18
PIF_VALUE: 27
PIF_VALUE: 0
PIF_VALUE: 28
PIF_VALUE: 31
PIF_VALUE: 20
PIF_VALUE: 0
PIF_VALUE: 26
PIF_VALUE: 28
PIF_VALUE: 15
PIF_VALUE: 28
PIF_VALUE: 28
PIF_VALUE: 3
PIF_VALUE: 5
PIF_VALUE: 30
PIF_VALUE: 7
PIF_VALUE: 18
PIF_VALUE: 28
PIF_VALUE: 28
PIF_VALUE: 30
PIF_VALUE: 28
PIF_VALUE: 10
PIF_VALUE: 0
PIF_VALUE: 20
PIF_VALUE: 18
PIF_VALUE: 0
PIF_VALUE: 27
PIF_VALUE: 27
PIF_VALUE: 0
PIF_VALUE: 17
PIF_VALUE: 27
PIF_VALUE: 10
PIF_VALUE: 29
PIF_VALUE: 11
PIF_VALUE: 28
PIF_VALUE: 19
PIF_VALUE: 27
PIF_VALUE: 27
PIF_VALUE: 17
PIF_VALUE: 36
PIF_VALUE: 29
PIF_VALUE: 11
PIF_VALUE: 18
PIF_VALUE: 28
PIF_VALUE: 17
PIF_VALUE: 22
PIF_VALUE: 9
PIF_VALUE: 29
PIF_VALUE: 2
PIF_VALUE: 26
PIF_VALUE: 28
PIF_VALUE: 28
PIF_VALUE: 16
PIF_VALUE: 20
PIF_VALUE: 28
PIF_VALUE: 2
PIF_VALUE: 28
PIF_VALUE: 17
PIF_VALUE: 26
PIF_VALUE: 27
PIF_VALUE: 8
PIF_VALUE: 30
PIF_VALUE: 27
PIF_VALUE: 2
PIF_VALUE: 10
PIF_VALUE: 0
PIF_VALUE: 10
PIF_VALUE: 10
PIF_VALUE: 12
PIF_VALUE: 27
PIF_VALUE: 29
PIF_VALUE: 28
PIF_VALUE: 18
PIF_VALUE: 10
PIF_VALUE: 17
PIF_VALUE: 26
PIF_VALUE: 29

## 2019-09-28 ASSESSMENT — PAIN DESCRIPTION - DESCRIPTORS
DESCRIPTORS: DISCOMFORT
DESCRIPTORS: ACHING;SHARP
DESCRIPTORS: SORE;ACHING

## 2019-09-28 ASSESSMENT — PAIN SCALES - GENERAL
PAINLEVEL_OUTOF10: 9
PAINLEVEL_OUTOF10: 0
PAINLEVEL_OUTOF10: 6
PAINLEVEL_OUTOF10: 7
PAINLEVEL_OUTOF10: 6
PAINLEVEL_OUTOF10: 8
PAINLEVEL_OUTOF10: 0

## 2019-09-28 ASSESSMENT — PAIN - FUNCTIONAL ASSESSMENT
PAIN_FUNCTIONAL_ASSESSMENT: PREVENTS OR INTERFERES SOME ACTIVE ACTIVITIES AND ADLS
PAIN_FUNCTIONAL_ASSESSMENT: PREVENTS OR INTERFERES WITH MANY ACTIVE NOT PASSIVE ACTIVITIES

## 2019-09-28 ASSESSMENT — PAIN DESCRIPTION - ORIENTATION
ORIENTATION: RIGHT;LOWER
ORIENTATION: RIGHT;LOWER

## 2019-09-28 ASSESSMENT — PAIN DESCRIPTION - ONSET
ONSET: GRADUAL
ONSET: GRADUAL

## 2019-09-28 ASSESSMENT — PAIN DESCRIPTION - LOCATION
LOCATION: ABDOMEN

## 2019-09-28 ASSESSMENT — PAIN DESCRIPTION - PAIN TYPE
TYPE: SURGICAL PAIN

## 2019-09-28 ASSESSMENT — PAIN DESCRIPTION - FREQUENCY
FREQUENCY: INTERMITTENT
FREQUENCY: CONTINUOUS
FREQUENCY: INTERMITTENT

## 2019-09-28 ASSESSMENT — PAIN DESCRIPTION - PROGRESSION
CLINICAL_PROGRESSION: GRADUALLY WORSENING

## 2019-09-29 LAB
ALBUMIN SERPL-MCNC: 2.7 GM/DL (ref 3.4–5)
ALP BLD-CCNC: 162 IU/L (ref 40–129)
ALT SERPL-CCNC: 34 U/L (ref 10–40)
ANION GAP SERPL CALCULATED.3IONS-SCNC: 14 MMOL/L (ref 4–16)
AST SERPL-CCNC: 32 IU/L (ref 15–37)
BILIRUB SERPL-MCNC: 0.4 MG/DL (ref 0–1)
BILIRUBIN DIRECT: 0.3 MG/DL (ref 0–0.3)
BILIRUBIN, INDIRECT: 0.1 MG/DL (ref 0–0.7)
BUN BLDV-MCNC: 48 MG/DL (ref 6–23)
CALCIUM SERPL-MCNC: 7.6 MG/DL (ref 8.3–10.6)
CHLORIDE BLD-SCNC: 103 MMOL/L (ref 99–110)
CO2: 15 MMOL/L (ref 21–32)
CREAT SERPL-MCNC: 1.6 MG/DL (ref 0.6–1.1)
ESTIMATED AVERAGE GLUCOSE: 128 MG/DL
GFR AFRICAN AMERICAN: 38 ML/MIN/1.73M2
GFR NON-AFRICAN AMERICAN: 32 ML/MIN/1.73M2
GLUCOSE BLD-MCNC: 110 MG/DL (ref 70–99)
GLUCOSE BLD-MCNC: 127 MG/DL (ref 70–99)
GLUCOSE BLD-MCNC: 151 MG/DL (ref 70–99)
GLUCOSE BLD-MCNC: 152 MG/DL (ref 70–99)
GLUCOSE BLD-MCNC: 155 MG/DL (ref 70–99)
HBA1C MFR BLD: 6.1 % (ref 4.2–6.3)
HCT VFR BLD CALC: 29.6 % (ref 37–47)
HEMOGLOBIN: 8.6 GM/DL (ref 12.5–16)
MCH RBC QN AUTO: 24.4 PG (ref 27–31)
MCHC RBC AUTO-ENTMCNC: 29.1 % (ref 32–36)
MCV RBC AUTO: 83.9 FL (ref 78–100)
PDW BLD-RTO: 18.2 % (ref 11.7–14.9)
PLATELET # BLD: 526 K/CU MM (ref 140–440)
PMV BLD AUTO: 9.4 FL (ref 7.5–11.1)
POTASSIUM SERPL-SCNC: 5.4 MMOL/L (ref 3.5–5.1)
RBC # BLD: 3.53 M/CU MM (ref 4.2–5.4)
REASON FOR REJECTION: NORMAL
REJECTED TEST: NORMAL
REJECTED TEST: NORMAL
SODIUM BLD-SCNC: 132 MMOL/L (ref 135–145)
TOTAL PROTEIN: 5.8 GM/DL (ref 6.4–8.2)
WBC # BLD: 14.5 K/CU MM (ref 4–10.5)

## 2019-09-29 PROCEDURE — 2580000003 HC RX 258: Performed by: SURGERY

## 2019-09-29 PROCEDURE — 6360000002 HC RX W HCPCS: Performed by: SURGERY

## 2019-09-29 PROCEDURE — 85027 COMPLETE CBC AUTOMATED: CPT

## 2019-09-29 PROCEDURE — 80053 COMPREHEN METABOLIC PANEL: CPT

## 2019-09-29 PROCEDURE — 99232 SBSQ HOSP IP/OBS MODERATE 35: CPT | Performed by: INTERNAL MEDICINE

## 2019-09-29 PROCEDURE — 82962 GLUCOSE BLOOD TEST: CPT

## 2019-09-29 PROCEDURE — 80076 HEPATIC FUNCTION PANEL: CPT

## 2019-09-29 PROCEDURE — 83036 HEMOGLOBIN GLYCOSYLATED A1C: CPT

## 2019-09-29 PROCEDURE — 82248 BILIRUBIN DIRECT: CPT

## 2019-09-29 PROCEDURE — 80048 BASIC METABOLIC PNL TOTAL CA: CPT

## 2019-09-29 PROCEDURE — 6370000000 HC RX 637 (ALT 250 FOR IP): Performed by: SURGERY

## 2019-09-29 PROCEDURE — 2580000003 HC RX 258

## 2019-09-29 PROCEDURE — 2140000000 HC CCU INTERMEDIATE R&B

## 2019-09-29 PROCEDURE — 36415 COLL VENOUS BLD VENIPUNCTURE: CPT

## 2019-09-29 RX ORDER — SODIUM CHLORIDE 9 MG/ML
INJECTION, SOLUTION INTRAVENOUS
Status: COMPLETED
Start: 2019-09-29 | End: 2019-09-29

## 2019-09-29 RX ORDER — 0.9 % SODIUM CHLORIDE 0.9 %
250 INTRAVENOUS SOLUTION INTRAVENOUS ONCE
Status: DISCONTINUED | OUTPATIENT
Start: 2019-09-29 | End: 2019-10-05 | Stop reason: HOSPADM

## 2019-09-29 RX ADMIN — PIPERACILLIN AND TAZOBACTAM 3.38 G: 3; .375 INJECTION, POWDER, LYOPHILIZED, FOR SOLUTION INTRAVENOUS at 16:45

## 2019-09-29 RX ADMIN — SODIUM CHLORIDE 500 ML: 9 INJECTION, SOLUTION INTRAVENOUS at 05:24

## 2019-09-29 RX ADMIN — PIPERACILLIN AND TAZOBACTAM 3.38 G: 3; .375 INJECTION, POWDER, LYOPHILIZED, FOR SOLUTION INTRAVENOUS at 05:47

## 2019-09-29 RX ADMIN — SODIUM CHLORIDE: 9 INJECTION, SOLUTION INTRAVENOUS at 21:53

## 2019-09-29 RX ADMIN — METOPROLOL SUCCINATE 25 MG: 25 TABLET, EXTENDED RELEASE ORAL at 08:36

## 2019-09-29 RX ADMIN — PIPERACILLIN AND TAZOBACTAM 3.38 G: 3; .375 INJECTION, POWDER, LYOPHILIZED, FOR SOLUTION INTRAVENOUS at 23:14

## 2019-09-29 RX ADMIN — ENOXAPARIN SODIUM 30 MG: 30 INJECTION SUBCUTANEOUS at 20:50

## 2019-09-29 RX ADMIN — MORPHINE SULFATE 4 MG: 4 INJECTION, SOLUTION INTRAMUSCULAR; INTRAVENOUS at 11:25

## 2019-09-29 RX ADMIN — LEVOTHYROXINE SODIUM 50 MCG: 50 TABLET ORAL at 05:47

## 2019-09-29 RX ADMIN — MORPHINE SULFATE 4 MG: 4 INJECTION, SOLUTION INTRAMUSCULAR; INTRAVENOUS at 03:42

## 2019-09-29 RX ADMIN — SODIUM CHLORIDE: 9 INJECTION, SOLUTION INTRAVENOUS at 11:11

## 2019-09-29 RX ADMIN — PIPERACILLIN AND TAZOBACTAM 3.38 G: 3; .375 INJECTION, POWDER, LYOPHILIZED, FOR SOLUTION INTRAVENOUS at 11:12

## 2019-09-29 ASSESSMENT — PAIN SCALES - GENERAL
PAINLEVEL_OUTOF10: 10
PAINLEVEL_OUTOF10: 10

## 2019-09-30 LAB
CULTURE: NORMAL
CULTURE: NORMAL
GLUCOSE BLD-MCNC: 75 MG/DL (ref 70–99)
GLUCOSE BLD-MCNC: 81 MG/DL (ref 70–99)
GLUCOSE BLD-MCNC: 88 MG/DL (ref 70–99)
GLUCOSE BLD-MCNC: 91 MG/DL (ref 70–99)
Lab: NORMAL
Lab: NORMAL
SPECIMEN: NORMAL
SPECIMEN: NORMAL

## 2019-09-30 PROCEDURE — 6360000002 HC RX W HCPCS: Performed by: SURGERY

## 2019-09-30 PROCEDURE — 99232 SBSQ HOSP IP/OBS MODERATE 35: CPT | Performed by: INTERNAL MEDICINE

## 2019-09-30 PROCEDURE — 97166 OT EVAL MOD COMPLEX 45 MIN: CPT

## 2019-09-30 PROCEDURE — 6370000000 HC RX 637 (ALT 250 FOR IP): Performed by: SURGERY

## 2019-09-30 PROCEDURE — 82962 GLUCOSE BLOOD TEST: CPT

## 2019-09-30 PROCEDURE — 2140000000 HC CCU INTERMEDIATE R&B

## 2019-09-30 PROCEDURE — 97162 PT EVAL MOD COMPLEX 30 MIN: CPT

## 2019-09-30 PROCEDURE — 97530 THERAPEUTIC ACTIVITIES: CPT

## 2019-09-30 PROCEDURE — 2580000003 HC RX 258: Performed by: SURGERY

## 2019-09-30 PROCEDURE — 94761 N-INVAS EAR/PLS OXIMETRY MLT: CPT

## 2019-09-30 PROCEDURE — 97535 SELF CARE MNGMENT TRAINING: CPT

## 2019-09-30 PROCEDURE — APPSS30 APP SPLIT SHARED TIME 16-30 MINUTES: Performed by: NURSE PRACTITIONER

## 2019-09-30 RX ORDER — HYDROCODONE BITARTRATE AND ACETAMINOPHEN 5; 325 MG/1; MG/1
2 TABLET ORAL EVERY 6 HOURS PRN
Status: DISCONTINUED | OUTPATIENT
Start: 2019-09-30 | End: 2019-10-05 | Stop reason: HOSPADM

## 2019-09-30 RX ORDER — HYDROCODONE BITARTRATE AND ACETAMINOPHEN 5; 325 MG/1; MG/1
1 TABLET ORAL EVERY 6 HOURS PRN
Status: DISCONTINUED | OUTPATIENT
Start: 2019-09-30 | End: 2019-10-05 | Stop reason: HOSPADM

## 2019-09-30 RX ORDER — MORPHINE SULFATE 4 MG/ML
2 INJECTION, SOLUTION INTRAMUSCULAR; INTRAVENOUS EVERY 4 HOURS PRN
Status: DISCONTINUED | OUTPATIENT
Start: 2019-09-30 | End: 2019-10-05 | Stop reason: HOSPADM

## 2019-09-30 RX ADMIN — MORPHINE SULFATE 2 MG: 4 INJECTION, SOLUTION INTRAMUSCULAR; INTRAVENOUS at 21:55

## 2019-09-30 RX ADMIN — MORPHINE SULFATE 4 MG: 4 INJECTION, SOLUTION INTRAMUSCULAR; INTRAVENOUS at 08:25

## 2019-09-30 RX ADMIN — ENOXAPARIN SODIUM 30 MG: 30 INJECTION SUBCUTANEOUS at 21:56

## 2019-09-30 RX ADMIN — PIPERACILLIN AND TAZOBACTAM 3.38 G: 3; .375 INJECTION, POWDER, LYOPHILIZED, FOR SOLUTION INTRAVENOUS at 06:03

## 2019-09-30 RX ADMIN — PIPERACILLIN AND TAZOBACTAM 3.38 G: 3; .375 INJECTION, POWDER, LYOPHILIZED, FOR SOLUTION INTRAVENOUS at 22:14

## 2019-09-30 RX ADMIN — MORPHINE SULFATE 4 MG: 4 INJECTION, SOLUTION INTRAMUSCULAR; INTRAVENOUS at 16:25

## 2019-09-30 RX ADMIN — PIPERACILLIN AND TAZOBACTAM 3.38 G: 3; .375 INJECTION, POWDER, LYOPHILIZED, FOR SOLUTION INTRAVENOUS at 17:01

## 2019-09-30 RX ADMIN — METOPROLOL SUCCINATE 25 MG: 25 TABLET, EXTENDED RELEASE ORAL at 08:24

## 2019-09-30 RX ADMIN — LEVOTHYROXINE SODIUM 50 MCG: 50 TABLET ORAL at 06:04

## 2019-09-30 RX ADMIN — MORPHINE SULFATE 4 MG: 4 INJECTION, SOLUTION INTRAMUSCULAR; INTRAVENOUS at 03:09

## 2019-09-30 RX ADMIN — SODIUM CHLORIDE: 9 INJECTION, SOLUTION INTRAVENOUS at 16:27

## 2019-09-30 RX ADMIN — PIPERACILLIN AND TAZOBACTAM 3.38 G: 3; .375 INJECTION, POWDER, LYOPHILIZED, FOR SOLUTION INTRAVENOUS at 12:39

## 2019-09-30 RX ADMIN — SODIUM CHLORIDE: 9 INJECTION, SOLUTION INTRAVENOUS at 08:26

## 2019-09-30 ASSESSMENT — PAIN DESCRIPTION - LOCATION
LOCATION: ABDOMEN;FLANK
LOCATION: ABDOMEN;FLANK
LOCATION: BACK
LOCATION: ABDOMEN;FLANK
LOCATION: ABDOMEN;FLANK
LOCATION: BACK

## 2019-09-30 ASSESSMENT — PAIN SCALES - GENERAL
PAINLEVEL_OUTOF10: 10
PAINLEVEL_OUTOF10: 5
PAINLEVEL_OUTOF10: 7
PAINLEVEL_OUTOF10: 8
PAINLEVEL_OUTOF10: 10
PAINLEVEL_OUTOF10: 7
PAINLEVEL_OUTOF10: 0
PAINLEVEL_OUTOF10: 4
PAINLEVEL_OUTOF10: 1
PAINLEVEL_OUTOF10: 7

## 2019-09-30 ASSESSMENT — PAIN DESCRIPTION - DESCRIPTORS
DESCRIPTORS: SHARP

## 2019-09-30 ASSESSMENT — PAIN DESCRIPTION - FREQUENCY
FREQUENCY: CONTINUOUS

## 2019-09-30 ASSESSMENT — PAIN - FUNCTIONAL ASSESSMENT
PAIN_FUNCTIONAL_ASSESSMENT: PREVENTS OR INTERFERES SOME ACTIVE ACTIVITIES AND ADLS
PAIN_FUNCTIONAL_ASSESSMENT: PREVENTS OR INTERFERES SOME ACTIVE ACTIVITIES AND ADLS

## 2019-09-30 ASSESSMENT — PAIN DESCRIPTION - PROGRESSION
CLINICAL_PROGRESSION: GRADUALLY WORSENING

## 2019-09-30 ASSESSMENT — PAIN DESCRIPTION - PAIN TYPE
TYPE: CHRONIC PAIN
TYPE: CHRONIC PAIN
TYPE: ACUTE PAIN

## 2019-09-30 ASSESSMENT — PAIN DESCRIPTION - ORIENTATION
ORIENTATION: RIGHT
ORIENTATION: MID;LOWER
ORIENTATION: LOWER;MID
ORIENTATION: RIGHT

## 2019-09-30 ASSESSMENT — PAIN DESCRIPTION - ONSET
ONSET: GRADUAL
ONSET: ON-GOING

## 2019-10-01 LAB
ALBUMIN SERPL-MCNC: 2.4 GM/DL (ref 3.4–5)
ALP BLD-CCNC: 123 IU/L (ref 40–129)
ALT SERPL-CCNC: 20 U/L (ref 10–40)
ANION GAP SERPL CALCULATED.3IONS-SCNC: 10 MMOL/L (ref 4–16)
AST SERPL-CCNC: 16 IU/L (ref 15–37)
BILIRUB SERPL-MCNC: 0.3 MG/DL (ref 0–1)
BILIRUBIN DIRECT: 0.2 MG/DL (ref 0–0.3)
BILIRUBIN, INDIRECT: 0.1 MG/DL (ref 0–0.7)
BUN BLDV-MCNC: 30 MG/DL (ref 6–23)
CALCIUM SERPL-MCNC: 7.6 MG/DL (ref 8.3–10.6)
CHLORIDE BLD-SCNC: 102 MMOL/L (ref 99–110)
CO2: 18 MMOL/L (ref 21–32)
CREAT SERPL-MCNC: 1 MG/DL (ref 0.6–1.1)
GFR AFRICAN AMERICAN: >60 ML/MIN/1.73M2
GFR NON-AFRICAN AMERICAN: 55 ML/MIN/1.73M2
GLUCOSE BLD-MCNC: 104 MG/DL (ref 70–99)
GLUCOSE BLD-MCNC: 117 MG/DL (ref 70–99)
GLUCOSE BLD-MCNC: 118 MG/DL (ref 70–99)
GLUCOSE BLD-MCNC: 83 MG/DL (ref 70–99)
GLUCOSE BLD-MCNC: 97 MG/DL (ref 70–99)
GLUCOSE BLD-MCNC: 98 MG/DL (ref 70–99)
MAGNESIUM: 1.9 MG/DL (ref 1.8–2.4)
PHOSPHORUS: 3.2 MG/DL (ref 2.5–4.9)
POTASSIUM SERPL-SCNC: 4.4 MMOL/L (ref 3.5–5.1)
SODIUM BLD-SCNC: 130 MMOL/L (ref 135–145)
TOTAL PROTEIN: 5.5 GM/DL (ref 6.4–8.2)

## 2019-10-01 PROCEDURE — 82962 GLUCOSE BLOOD TEST: CPT

## 2019-10-01 PROCEDURE — 2580000003 HC RX 258: Performed by: SURGERY

## 2019-10-01 PROCEDURE — APPSS30 APP SPLIT SHARED TIME 16-30 MINUTES: Performed by: NURSE PRACTITIONER

## 2019-10-01 PROCEDURE — 99232 SBSQ HOSP IP/OBS MODERATE 35: CPT | Performed by: INTERNAL MEDICINE

## 2019-10-01 PROCEDURE — 51702 INSERT TEMP BLADDER CATH: CPT

## 2019-10-01 PROCEDURE — 2580000003 HC RX 258: Performed by: INTERNAL MEDICINE

## 2019-10-01 PROCEDURE — 82248 BILIRUBIN DIRECT: CPT

## 2019-10-01 PROCEDURE — 83735 ASSAY OF MAGNESIUM: CPT

## 2019-10-01 PROCEDURE — 80053 COMPREHEN METABOLIC PANEL: CPT

## 2019-10-01 PROCEDURE — 6370000000 HC RX 637 (ALT 250 FOR IP): Performed by: SURGERY

## 2019-10-01 PROCEDURE — 6360000002 HC RX W HCPCS: Performed by: SURGERY

## 2019-10-01 PROCEDURE — 84100 ASSAY OF PHOSPHORUS: CPT

## 2019-10-01 PROCEDURE — 6370000000 HC RX 637 (ALT 250 FOR IP): Performed by: INTERNAL MEDICINE

## 2019-10-01 PROCEDURE — 36415 COLL VENOUS BLD VENIPUNCTURE: CPT

## 2019-10-01 PROCEDURE — 2140000000 HC CCU INTERMEDIATE R&B

## 2019-10-01 PROCEDURE — 94150 VITAL CAPACITY TEST: CPT

## 2019-10-01 PROCEDURE — 94761 N-INVAS EAR/PLS OXIMETRY MLT: CPT

## 2019-10-01 RX ORDER — DILTIAZEM HYDROCHLORIDE 60 MG/1
60 TABLET, FILM COATED ORAL 4 TIMES DAILY
Status: DISCONTINUED | OUTPATIENT
Start: 2019-10-01 | End: 2019-10-05 | Stop reason: HOSPADM

## 2019-10-01 RX ADMIN — DILTIAZEM HYDROCHLORIDE 60 MG: 60 TABLET, FILM COATED ORAL at 14:51

## 2019-10-01 RX ADMIN — LEVOTHYROXINE SODIUM 50 MCG: 50 TABLET ORAL at 06:31

## 2019-10-01 RX ADMIN — METOPROLOL SUCCINATE 25 MG: 25 TABLET, EXTENDED RELEASE ORAL at 09:48

## 2019-10-01 RX ADMIN — SODIUM CHLORIDE: 9 INJECTION, SOLUTION INTRAVENOUS at 02:10

## 2019-10-01 RX ADMIN — PIPERACILLIN AND TAZOBACTAM 3.38 G: 3; .375 INJECTION, POWDER, LYOPHILIZED, FOR SOLUTION INTRAVENOUS at 18:46

## 2019-10-01 RX ADMIN — PIPERACILLIN AND TAZOBACTAM 3.38 G: 3; .375 INJECTION, POWDER, LYOPHILIZED, FOR SOLUTION INTRAVENOUS at 06:31

## 2019-10-01 RX ADMIN — HYDROCODONE BITARTRATE AND ACETAMINOPHEN 2 TABLET: 5; 325 TABLET ORAL at 22:19

## 2019-10-01 RX ADMIN — SODIUM CHLORIDE: 9 INJECTION, SOLUTION INTRAVENOUS at 22:21

## 2019-10-01 RX ADMIN — PIPERACILLIN AND TAZOBACTAM 3.38 G: 3; .375 INJECTION, POWDER, LYOPHILIZED, FOR SOLUTION INTRAVENOUS at 13:08

## 2019-10-01 RX ADMIN — SODIUM CHLORIDE: 9 INJECTION, SOLUTION INTRAVENOUS at 13:08

## 2019-10-01 RX ADMIN — DILTIAZEM HYDROCHLORIDE 60 MG: 60 TABLET, FILM COATED ORAL at 20:20

## 2019-10-01 RX ADMIN — APIXABAN 5 MG: 5 TABLET, FILM COATED ORAL at 20:20

## 2019-10-01 RX ADMIN — DEXTROSE MONOHYDRATE 100 ML/HR: 5 INJECTION INTRAVENOUS at 13:08

## 2019-10-01 RX ADMIN — HYDROCODONE BITARTRATE AND ACETAMINOPHEN 2 TABLET: 5; 325 TABLET ORAL at 06:35

## 2019-10-01 ASSESSMENT — PAIN SCALES - GENERAL
PAINLEVEL_OUTOF10: 0
PAINLEVEL_OUTOF10: 7
PAINLEVEL_OUTOF10: 0
PAINLEVEL_OUTOF10: 9
PAINLEVEL_OUTOF10: 0
PAINLEVEL_OUTOF10: 0

## 2019-10-02 LAB
CULTURE: ABNORMAL
GLUCOSE BLD-MCNC: 100 MG/DL (ref 70–99)
GLUCOSE BLD-MCNC: 109 MG/DL (ref 70–99)
GLUCOSE BLD-MCNC: 125 MG/DL (ref 70–99)
GLUCOSE BLD-MCNC: 135 MG/DL (ref 70–99)
GRAM SMEAR: ABNORMAL
Lab: ABNORMAL
SPECIMEN: ABNORMAL

## 2019-10-02 PROCEDURE — 82962 GLUCOSE BLOOD TEST: CPT

## 2019-10-02 PROCEDURE — 6360000002 HC RX W HCPCS: Performed by: SURGERY

## 2019-10-02 PROCEDURE — 94761 N-INVAS EAR/PLS OXIMETRY MLT: CPT

## 2019-10-02 PROCEDURE — 6370000000 HC RX 637 (ALT 250 FOR IP): Performed by: INTERNAL MEDICINE

## 2019-10-02 PROCEDURE — 94150 VITAL CAPACITY TEST: CPT

## 2019-10-02 PROCEDURE — 2140000000 HC CCU INTERMEDIATE R&B

## 2019-10-02 PROCEDURE — 97535 SELF CARE MNGMENT TRAINING: CPT

## 2019-10-02 PROCEDURE — 6370000000 HC RX 637 (ALT 250 FOR IP): Performed by: SURGERY

## 2019-10-02 PROCEDURE — 2580000003 HC RX 258: Performed by: SURGERY

## 2019-10-02 PROCEDURE — 97530 THERAPEUTIC ACTIVITIES: CPT

## 2019-10-02 RX ADMIN — LEVOTHYROXINE SODIUM 50 MCG: 50 TABLET ORAL at 04:35

## 2019-10-02 RX ADMIN — SODIUM CHLORIDE: 9 INJECTION, SOLUTION INTRAVENOUS at 07:52

## 2019-10-02 RX ADMIN — PIPERACILLIN AND TAZOBACTAM 3.38 G: 3; .375 INJECTION, POWDER, LYOPHILIZED, FOR SOLUTION INTRAVENOUS at 16:52

## 2019-10-02 RX ADMIN — APIXABAN 5 MG: 5 TABLET, FILM COATED ORAL at 20:21

## 2019-10-02 RX ADMIN — METOPROLOL SUCCINATE 25 MG: 25 TABLET, EXTENDED RELEASE ORAL at 08:52

## 2019-10-02 RX ADMIN — HYDROCODONE BITARTRATE AND ACETAMINOPHEN 2 TABLET: 5; 325 TABLET ORAL at 04:48

## 2019-10-02 RX ADMIN — APIXABAN 5 MG: 5 TABLET, FILM COATED ORAL at 08:52

## 2019-10-02 RX ADMIN — DILTIAZEM HYDROCHLORIDE 60 MG: 60 TABLET, FILM COATED ORAL at 12:16

## 2019-10-02 RX ADMIN — DILTIAZEM HYDROCHLORIDE 60 MG: 60 TABLET, FILM COATED ORAL at 20:24

## 2019-10-02 RX ADMIN — DILTIAZEM HYDROCHLORIDE 60 MG: 60 TABLET, FILM COATED ORAL at 08:52

## 2019-10-02 RX ADMIN — BISACODYL 10 MG: 5 TABLET, COATED ORAL at 15:40

## 2019-10-02 RX ADMIN — PIPERACILLIN AND TAZOBACTAM 3.38 G: 3; .375 INJECTION, POWDER, LYOPHILIZED, FOR SOLUTION INTRAVENOUS at 04:35

## 2019-10-02 RX ADMIN — DILTIAZEM HYDROCHLORIDE 60 MG: 60 TABLET, FILM COATED ORAL at 16:52

## 2019-10-02 RX ADMIN — PIPERACILLIN AND TAZOBACTAM 3.38 G: 3; .375 INJECTION, POWDER, LYOPHILIZED, FOR SOLUTION INTRAVENOUS at 11:24

## 2019-10-02 RX ADMIN — PIPERACILLIN AND TAZOBACTAM 3.38 G: 3; .375 INJECTION, POWDER, LYOPHILIZED, FOR SOLUTION INTRAVENOUS at 00:50

## 2019-10-02 ASSESSMENT — PAIN SCALES - GENERAL
PAINLEVEL_OUTOF10: 10
PAINLEVEL_OUTOF10: 0

## 2019-10-03 LAB
ALBUMIN SERPL-MCNC: 2.5 GM/DL (ref 3.4–5)
ALP BLD-CCNC: 99 IU/L (ref 40–128)
ALT SERPL-CCNC: 14 U/L (ref 10–40)
ANION GAP SERPL CALCULATED.3IONS-SCNC: 15 MMOL/L (ref 4–16)
AST SERPL-CCNC: 10 IU/L (ref 15–37)
BILIRUB SERPL-MCNC: 0.4 MG/DL (ref 0–1)
BUN BLDV-MCNC: 8 MG/DL (ref 6–23)
CALCIUM SERPL-MCNC: 7.8 MG/DL (ref 8.3–10.6)
CHLORIDE BLD-SCNC: 104 MMOL/L (ref 99–110)
CO2: 17 MMOL/L (ref 21–32)
CREAT SERPL-MCNC: 0.6 MG/DL (ref 0.6–1.1)
GFR AFRICAN AMERICAN: >60 ML/MIN/1.73M2
GFR NON-AFRICAN AMERICAN: >60 ML/MIN/1.73M2
GLUCOSE BLD-MCNC: 129 MG/DL (ref 70–99)
GLUCOSE BLD-MCNC: 132 MG/DL (ref 70–99)
GLUCOSE BLD-MCNC: 132 MG/DL (ref 70–99)
GLUCOSE BLD-MCNC: 143 MG/DL (ref 70–99)
GLUCOSE BLD-MCNC: 150 MG/DL (ref 70–99)
HCT VFR BLD CALC: 26.1 % (ref 37–47)
HEMOGLOBIN: 7.7 GM/DL (ref 12.5–16)
MCH RBC QN AUTO: 23.5 PG (ref 27–31)
MCHC RBC AUTO-ENTMCNC: 29.5 % (ref 32–36)
MCV RBC AUTO: 79.6 FL (ref 78–100)
PDW BLD-RTO: 18.2 % (ref 11.7–14.9)
PLATELET # BLD: 607 K/CU MM (ref 140–440)
PMV BLD AUTO: 8.5 FL (ref 7.5–11.1)
POTASSIUM SERPL-SCNC: 4.2 MMOL/L (ref 3.5–5.1)
RBC # BLD: 3.28 M/CU MM (ref 4.2–5.4)
SODIUM BLD-SCNC: 136 MMOL/L (ref 135–145)
TOTAL PROTEIN: 5.6 GM/DL (ref 6.4–8.2)
WBC # BLD: 12.3 K/CU MM (ref 4–10.5)

## 2019-10-03 PROCEDURE — 6370000000 HC RX 637 (ALT 250 FOR IP): Performed by: SURGERY

## 2019-10-03 PROCEDURE — 2580000003 HC RX 258: Performed by: INTERNAL MEDICINE

## 2019-10-03 PROCEDURE — 36415 COLL VENOUS BLD VENIPUNCTURE: CPT

## 2019-10-03 PROCEDURE — 82962 GLUCOSE BLOOD TEST: CPT

## 2019-10-03 PROCEDURE — 6360000002 HC RX W HCPCS: Performed by: INTERNAL MEDICINE

## 2019-10-03 PROCEDURE — 6370000000 HC RX 637 (ALT 250 FOR IP): Performed by: INTERNAL MEDICINE

## 2019-10-03 PROCEDURE — 94761 N-INVAS EAR/PLS OXIMETRY MLT: CPT

## 2019-10-03 PROCEDURE — 2140000000 HC CCU INTERMEDIATE R&B

## 2019-10-03 PROCEDURE — 6360000002 HC RX W HCPCS: Performed by: SURGERY

## 2019-10-03 PROCEDURE — 80053 COMPREHEN METABOLIC PANEL: CPT

## 2019-10-03 PROCEDURE — 85027 COMPLETE CBC AUTOMATED: CPT

## 2019-10-03 PROCEDURE — 2580000003 HC RX 258: Performed by: SURGERY

## 2019-10-03 RX ADMIN — HYDROCODONE BITARTRATE AND ACETAMINOPHEN 1 TABLET: 5; 325 TABLET ORAL at 11:20

## 2019-10-03 RX ADMIN — PIPERACILLIN AND TAZOBACTAM 3.38 G: 3; .375 INJECTION, POWDER, LYOPHILIZED, FOR SOLUTION INTRAVENOUS at 00:27

## 2019-10-03 RX ADMIN — APIXABAN 5 MG: 5 TABLET, FILM COATED ORAL at 11:09

## 2019-10-03 RX ADMIN — Medication 10 ML: at 23:22

## 2019-10-03 RX ADMIN — HYDROCODONE BITARTRATE AND ACETAMINOPHEN 2 TABLET: 5; 325 TABLET ORAL at 21:41

## 2019-10-03 RX ADMIN — DILTIAZEM HYDROCHLORIDE 60 MG: 60 TABLET, FILM COATED ORAL at 21:40

## 2019-10-03 RX ADMIN — DILTIAZEM HYDROCHLORIDE 60 MG: 60 TABLET, FILM COATED ORAL at 15:07

## 2019-10-03 RX ADMIN — PIPERACILLIN AND TAZOBACTAM 3.38 G: 3; .375 INJECTION, POWDER, LYOPHILIZED, FOR SOLUTION INTRAVENOUS at 11:07

## 2019-10-03 RX ADMIN — APIXABAN 5 MG: 5 TABLET, FILM COATED ORAL at 21:40

## 2019-10-03 RX ADMIN — DILTIAZEM HYDROCHLORIDE 60 MG: 60 TABLET, FILM COATED ORAL at 11:04

## 2019-10-03 RX ADMIN — METOPROLOL SUCCINATE 25 MG: 25 TABLET, EXTENDED RELEASE ORAL at 11:05

## 2019-10-03 RX ADMIN — PIPERACILLIN AND TAZOBACTAM 3.38 G: 3; .375 INJECTION, POWDER, LYOPHILIZED, FOR SOLUTION INTRAVENOUS at 16:12

## 2019-10-03 RX ADMIN — INSULIN LISPRO 1 UNITS: 100 INJECTION, SOLUTION INTRAVENOUS; SUBCUTANEOUS at 11:21

## 2019-10-03 RX ADMIN — PIPERACILLIN AND TAZOBACTAM 3.38 G: 3; .375 INJECTION, POWDER, LYOPHILIZED, FOR SOLUTION INTRAVENOUS at 23:22

## 2019-10-03 RX ADMIN — Medication 10 ML: at 11:08

## 2019-10-03 RX ADMIN — LEVOTHYROXINE SODIUM 50 MCG: 50 TABLET ORAL at 06:40

## 2019-10-03 ASSESSMENT — PAIN DESCRIPTION - PROGRESSION
CLINICAL_PROGRESSION: GRADUALLY WORSENING
CLINICAL_PROGRESSION: NOT CHANGED
CLINICAL_PROGRESSION: GRADUALLY WORSENING

## 2019-10-03 ASSESSMENT — PAIN DESCRIPTION - ORIENTATION
ORIENTATION: RIGHT;LEFT
ORIENTATION: LOWER;MID
ORIENTATION: LOWER

## 2019-10-03 ASSESSMENT — PAIN DESCRIPTION - ONSET
ONSET: GRADUAL
ONSET: ON-GOING

## 2019-10-03 ASSESSMENT — PAIN SCALES - GENERAL
PAINLEVEL_OUTOF10: 0
PAINLEVEL_OUTOF10: 8
PAINLEVEL_OUTOF10: 0
PAINLEVEL_OUTOF10: 10
PAINLEVEL_OUTOF10: 2
PAINLEVEL_OUTOF10: 5

## 2019-10-03 ASSESSMENT — PAIN DESCRIPTION - DESCRIPTORS
DESCRIPTORS: ACHING;DULL;CONSTANT
DESCRIPTORS: SHARP

## 2019-10-03 ASSESSMENT — PAIN DESCRIPTION - LOCATION
LOCATION: BACK
LOCATION: ABDOMEN
LOCATION: BACK;LEG

## 2019-10-03 ASSESSMENT — PAIN DESCRIPTION - PAIN TYPE
TYPE: CHRONIC PAIN
TYPE: CHRONIC PAIN

## 2019-10-03 ASSESSMENT — PAIN DESCRIPTION - FREQUENCY
FREQUENCY: CONTINUOUS
FREQUENCY: CONTINUOUS

## 2019-10-03 ASSESSMENT — PAIN DESCRIPTION - DIRECTION: RADIATING_TOWARDS: BILATERAL LEGS

## 2019-10-04 VITALS
RESPIRATION RATE: 17 BRPM | BODY MASS INDEX: 37.92 KG/M2 | HEIGHT: 64 IN | WEIGHT: 222.1 LBS | TEMPERATURE: 98.1 F | OXYGEN SATURATION: 97 % | SYSTOLIC BLOOD PRESSURE: 119 MMHG | DIASTOLIC BLOOD PRESSURE: 79 MMHG | HEART RATE: 83 BPM

## 2019-10-04 LAB
GLUCOSE BLD-MCNC: 113 MG/DL (ref 70–99)
GLUCOSE BLD-MCNC: 121 MG/DL (ref 70–99)
GLUCOSE BLD-MCNC: 128 MG/DL (ref 70–99)

## 2019-10-04 PROCEDURE — 97116 GAIT TRAINING THERAPY: CPT

## 2019-10-04 PROCEDURE — 6360000002 HC RX W HCPCS: Performed by: INTERNAL MEDICINE

## 2019-10-04 PROCEDURE — 97530 THERAPEUTIC ACTIVITIES: CPT

## 2019-10-04 PROCEDURE — 94150 VITAL CAPACITY TEST: CPT

## 2019-10-04 PROCEDURE — 6370000000 HC RX 637 (ALT 250 FOR IP): Performed by: SURGERY

## 2019-10-04 PROCEDURE — 2700000000 HC OXYGEN THERAPY PER DAY

## 2019-10-04 PROCEDURE — 6370000000 HC RX 637 (ALT 250 FOR IP): Performed by: INTERNAL MEDICINE

## 2019-10-04 PROCEDURE — 2580000003 HC RX 258: Performed by: INTERNAL MEDICINE

## 2019-10-04 PROCEDURE — 94761 N-INVAS EAR/PLS OXIMETRY MLT: CPT

## 2019-10-04 PROCEDURE — 2580000003 HC RX 258: Performed by: SURGERY

## 2019-10-04 PROCEDURE — 82962 GLUCOSE BLOOD TEST: CPT

## 2019-10-04 RX ORDER — DILTIAZEM HYDROCHLORIDE 240 MG/1
240 CAPSULE, COATED, EXTENDED RELEASE ORAL DAILY
Qty: 30 CAPSULE | Refills: 3 | DISCHARGE
Start: 2019-10-04 | End: 2021-07-12 | Stop reason: ALTCHOICE

## 2019-10-04 RX ORDER — LEVOTHYROXINE SODIUM 0.05 MG/1
50 TABLET ORAL DAILY
Qty: 30 TABLET | Refills: 3 | DISCHARGE
Start: 2019-10-05 | End: 2021-07-12 | Stop reason: ALTCHOICE

## 2019-10-04 RX ORDER — CIPROFLOXACIN 250 MG/1
250 TABLET, FILM COATED ORAL 2 TIMES DAILY
Refills: 0 | DISCHARGE
Start: 2019-10-04 | End: 2019-10-14

## 2019-10-04 RX ORDER — HYDROCODONE BITARTRATE AND ACETAMINOPHEN 5; 325 MG/1; MG/1
1 TABLET ORAL EVERY 6 HOURS PRN
Qty: 20 TABLET | Refills: 0 | Status: SHIPPED | OUTPATIENT
Start: 2019-10-04 | End: 2019-10-09

## 2019-10-04 RX ADMIN — DILTIAZEM HYDROCHLORIDE 60 MG: 60 TABLET, FILM COATED ORAL at 17:06

## 2019-10-04 RX ADMIN — HYDROCODONE BITARTRATE AND ACETAMINOPHEN 1 TABLET: 5; 325 TABLET ORAL at 21:22

## 2019-10-04 RX ADMIN — PIPERACILLIN AND TAZOBACTAM 3.38 G: 3; .375 INJECTION, POWDER, LYOPHILIZED, FOR SOLUTION INTRAVENOUS at 11:54

## 2019-10-04 RX ADMIN — Medication 10 ML: at 11:52

## 2019-10-04 RX ADMIN — DILTIAZEM HYDROCHLORIDE 60 MG: 60 TABLET, FILM COATED ORAL at 21:22

## 2019-10-04 RX ADMIN — APIXABAN 5 MG: 5 TABLET, FILM COATED ORAL at 21:22

## 2019-10-04 RX ADMIN — APIXABAN 5 MG: 5 TABLET, FILM COATED ORAL at 11:57

## 2019-10-04 RX ADMIN — PIPERACILLIN AND TAZOBACTAM 3.38 G: 3; .375 INJECTION, POWDER, LYOPHILIZED, FOR SOLUTION INTRAVENOUS at 17:04

## 2019-10-04 RX ADMIN — HYDROCODONE BITARTRATE AND ACETAMINOPHEN 1 TABLET: 5; 325 TABLET ORAL at 06:07

## 2019-10-04 RX ADMIN — LEVOTHYROXINE SODIUM 50 MCG: 50 TABLET ORAL at 06:08

## 2019-10-04 RX ADMIN — PIPERACILLIN AND TAZOBACTAM 3.38 G: 3; .375 INJECTION, POWDER, LYOPHILIZED, FOR SOLUTION INTRAVENOUS at 06:06

## 2019-10-04 ASSESSMENT — PAIN SCALES - GENERAL
PAINLEVEL_OUTOF10: 8
PAINLEVEL_OUTOF10: 0
PAINLEVEL_OUTOF10: 6
PAINLEVEL_OUTOF10: 0

## 2019-10-06 PROBLEM — K81.2 ACUTE ON CHRONIC CHOLECYSTITIS: Status: ACTIVE | Noted: 2019-10-06

## 2019-10-07 LAB
EKG DIAGNOSIS: NORMAL
EKG Q-T INTERVAL: 236 MS
EKG QRS DURATION: 82 MS
EKG QTC CALCULATION (BAZETT): 367 MS
EKG R AXIS: -18 DEGREES
EKG T AXIS: 153 DEGREES
EKG VENTRICULAR RATE: 146 BPM

## 2019-10-08 ENCOUNTER — HOSPITAL ENCOUNTER (OUTPATIENT)
Age: 74
Discharge: HOME | End: 2019-10-08

## 2019-10-08 LAB
ALBUMIN SERPL-MCNC: 2.4 GM/DL (ref 3.4–5)
ALP BLD-CCNC: 82 IU/L (ref 40–129)
ALT SERPL-CCNC: 10 U/L (ref 10–40)
ANION GAP SERPL CALCULATED.3IONS-SCNC: 10 MMOL/L (ref 4–16)
AST SERPL-CCNC: 10 IU/L (ref 15–37)
BILIRUB SERPL-MCNC: 0.3 MG/DL (ref 0–1)
BUN BLDV-MCNC: 5 MG/DL (ref 6–23)
CALCIUM SERPL-MCNC: 7.4 MG/DL (ref 8.3–10.6)
CHLORIDE BLD-SCNC: 100 MMOL/L (ref 99–110)
CO2: 26 MMOL/L (ref 21–32)
CREAT SERPL-MCNC: 0.7 MG/DL (ref 0.6–1.1)
GFR AFRICAN AMERICAN: >60 ML/MIN/1.73M2
GFR NON-AFRICAN AMERICAN: >60 ML/MIN/1.73M2
GLUCOSE BLD-MCNC: 108 MG/DL (ref 70–99)
HCT VFR BLD CALC: 24.3 % (ref 37–47)
HEMOGLOBIN: 7 GM/DL (ref 12.5–16)
MCH RBC QN AUTO: 23.6 PG (ref 27–31)
MCHC RBC AUTO-ENTMCNC: 28.8 % (ref 32–36)
MCV RBC AUTO: 81.8 FL (ref 78–100)
PDW BLD-RTO: 18.8 % (ref 11.7–14.9)
PLATELET # BLD: 694 K/CU MM (ref 140–440)
PMV BLD AUTO: 8.7 FL (ref 7.5–11.1)
POTASSIUM SERPL-SCNC: 4.1 MMOL/L (ref 3.5–5.1)
RBC # BLD: 2.97 M/CU MM (ref 4.2–5.4)
SODIUM BLD-SCNC: 136 MMOL/L (ref 135–145)
TOTAL PROTEIN: 5.3 GM/DL (ref 6.4–8.2)
WBC # BLD: 6 K/CU MM (ref 4–10.5)

## 2019-10-08 PROCEDURE — 36415 COLL VENOUS BLD VENIPUNCTURE: CPT

## 2019-10-08 PROCEDURE — 80053 COMPREHEN METABOLIC PANEL: CPT

## 2019-10-08 PROCEDURE — 85027 COMPLETE CBC AUTOMATED: CPT

## 2019-10-11 ENCOUNTER — HOSPITAL ENCOUNTER (OUTPATIENT)
Age: 74
Discharge: HOME | End: 2019-10-11

## 2019-10-11 LAB
HCT VFR BLD CALC: 25 % (ref 37–47)
HEMOGLOBIN: 7 GM/DL (ref 12.5–16)

## 2019-10-11 PROCEDURE — 85018 HEMOGLOBIN: CPT

## 2019-10-11 PROCEDURE — 85014 HEMATOCRIT: CPT

## 2019-10-11 PROCEDURE — 36415 COLL VENOUS BLD VENIPUNCTURE: CPT

## 2019-10-15 ENCOUNTER — HOSPITAL ENCOUNTER (OUTPATIENT)
Age: 74
Discharge: HOME | End: 2019-10-15

## 2019-10-15 LAB
ANION GAP SERPL CALCULATED.3IONS-SCNC: 10 MMOL/L (ref 4–16)
BUN BLDV-MCNC: 9 MG/DL (ref 6–23)
CALCIUM SERPL-MCNC: 8.5 MG/DL (ref 8.3–10.6)
CHLORIDE BLD-SCNC: 99 MMOL/L (ref 99–110)
CO2: 26 MMOL/L (ref 21–32)
CREAT SERPL-MCNC: 0.9 MG/DL (ref 0.6–1.1)
GFR AFRICAN AMERICAN: >60 ML/MIN/1.73M2
GFR NON-AFRICAN AMERICAN: >60 ML/MIN/1.73M2
GLUCOSE BLD-MCNC: 130 MG/DL (ref 70–99)
HCT VFR BLD CALC: 25.9 % (ref 37–47)
HEMOGLOBIN: 7.3 GM/DL (ref 12.5–16)
MCH RBC QN AUTO: 23.9 PG (ref 27–31)
MCHC RBC AUTO-ENTMCNC: 28.2 % (ref 32–36)
MCV RBC AUTO: 84.9 FL (ref 78–100)
PDW BLD-RTO: 20.1 % (ref 11.7–14.9)
PLATELET # BLD: 566 K/CU MM (ref 140–440)
PMV BLD AUTO: 8.8 FL (ref 7.5–11.1)
POTASSIUM SERPL-SCNC: 5 MMOL/L (ref 3.5–5.1)
RBC # BLD: 3.05 M/CU MM (ref 4.2–5.4)
SODIUM BLD-SCNC: 135 MMOL/L (ref 135–145)
WBC # BLD: 4.7 K/CU MM (ref 4–10.5)

## 2019-10-15 PROCEDURE — 85027 COMPLETE CBC AUTOMATED: CPT

## 2019-10-15 PROCEDURE — 80048 BASIC METABOLIC PNL TOTAL CA: CPT

## 2019-10-15 PROCEDURE — 36415 COLL VENOUS BLD VENIPUNCTURE: CPT

## 2020-05-10 ENCOUNTER — APPOINTMENT (OUTPATIENT)
Dept: GENERAL RADIOLOGY | Age: 75
DRG: 378 | End: 2020-05-10
Payer: MEDICARE

## 2020-05-10 ENCOUNTER — HOSPITAL ENCOUNTER (INPATIENT)
Age: 75
LOS: 7 days | Discharge: HOME | DRG: 378 | End: 2020-05-17
Attending: EMERGENCY MEDICINE | Admitting: STUDENT IN AN ORGANIZED HEALTH CARE EDUCATION/TRAINING PROGRAM
Payer: MEDICARE

## 2020-05-10 ENCOUNTER — APPOINTMENT (OUTPATIENT)
Dept: CT IMAGING | Age: 75
DRG: 378 | End: 2020-05-10
Payer: MEDICARE

## 2020-05-10 PROBLEM — D64.9 ACUTE ON CHRONIC ANEMIA: Status: ACTIVE | Noted: 2020-05-10

## 2020-05-10 LAB
ALBUMIN SERPL-MCNC: 2.8 GM/DL (ref 3.4–5)
ALP BLD-CCNC: 56 IU/L (ref 40–129)
ALT SERPL-CCNC: 15 U/L (ref 10–40)
ANION GAP SERPL CALCULATED.3IONS-SCNC: 11 MMOL/L (ref 4–16)
ANION GAP SERPL CALCULATED.3IONS-SCNC: 12 MMOL/L (ref 4–16)
ANION GAP SERPL CALCULATED.3IONS-SCNC: 23 MMOL/L (ref 4–16)
ANION GAP SERPL CALCULATED.3IONS-SCNC: 9 MMOL/L (ref 4–16)
AST SERPL-CCNC: 25 IU/L (ref 15–37)
BACTERIA: ABNORMAL /HPF
BASOPHILS ABSOLUTE: 0 K/CU MM
BASOPHILS RELATIVE PERCENT: 0.1 % (ref 0–1)
BILIRUB SERPL-MCNC: 0.3 MG/DL (ref 0–1)
BILIRUBIN URINE: NEGATIVE MG/DL
BLOOD, URINE: NEGATIVE
BUN BLDV-MCNC: 33 MG/DL (ref 6–23)
BUN BLDV-MCNC: 37 MG/DL (ref 6–23)
BUN BLDV-MCNC: 38 MG/DL (ref 6–23)
BUN BLDV-MCNC: 38 MG/DL (ref 6–23)
CALCIUM SERPL-MCNC: 7.6 MG/DL (ref 8.3–10.6)
CALCIUM SERPL-MCNC: 7.8 MG/DL (ref 8.3–10.6)
CHLORIDE BLD-SCNC: 110 MMOL/L (ref 99–110)
CHLORIDE BLD-SCNC: 110 MMOL/L (ref 99–110)
CHLORIDE BLD-SCNC: 111 MMOL/L (ref 99–110)
CHLORIDE BLD-SCNC: 111 MMOL/L (ref 99–110)
CHP ED QC CHECK: NORMAL
CLARITY: CLEAR
CO2: 16 MMOL/L (ref 21–32)
CO2: 18 MMOL/L (ref 21–32)
CO2: 19 MMOL/L (ref 21–32)
CO2: 7 MMOL/L (ref 21–32)
COLOR: YELLOW
CREAT SERPL-MCNC: 1 MG/DL (ref 0.6–1.1)
CREAT SERPL-MCNC: 1.1 MG/DL (ref 0.6–1.1)
DIFFERENTIAL TYPE: ABNORMAL
DOSE AMOUNT: ABNORMAL
DOSE TIME: ABNORMAL
EOSINOPHILS ABSOLUTE: 0 K/CU MM
EOSINOPHILS RELATIVE PERCENT: 0.3 % (ref 0–3)
GFR AFRICAN AMERICAN: 59 ML/MIN/1.73M2
GFR AFRICAN AMERICAN: >60 ML/MIN/1.73M2
GFR NON-AFRICAN AMERICAN: 49 ML/MIN/1.73M2
GFR NON-AFRICAN AMERICAN: 54 ML/MIN/1.73M2
GLUCOSE BLD-MCNC: 114 MG/DL (ref 70–99)
GLUCOSE BLD-MCNC: 117 MG/DL (ref 70–99)
GLUCOSE BLD-MCNC: 124 MG/DL (ref 70–99)
GLUCOSE BLD-MCNC: 132 MG/DL (ref 70–99)
GLUCOSE BLD-MCNC: 158 MG/DL
GLUCOSE BLD-MCNC: 183 MG/DL (ref 70–99)
GLUCOSE BLD-MCNC: 224 MG/DL (ref 70–99)
GLUCOSE, URINE: NEGATIVE MG/DL
HCT VFR BLD CALC: 10.6 % (ref 37–47)
HCT VFR BLD CALC: 16.3 % (ref 37–47)
HCT VFR BLD CALC: 17.8 % (ref 37–47)
HCT VFR BLD CALC: 17.9 % (ref 37–47)
HEMOGLOBIN: 2.3 GM/DL (ref 12.5–16)
HEMOGLOBIN: 4.7 GM/DL (ref 12.5–16)
HEMOGLOBIN: 5.3 GM/DL (ref 12.5–16)
HEMOGLOBIN: 5.3 GM/DL (ref 12.5–16)
HYALINE CASTS: 5 /LPF
IMMATURE NEUTROPHIL %: 1 % (ref 0–0.43)
INR BLD: 2.49 INDEX
KETONES, URINE: NEGATIVE MG/DL
LACTATE: 1.4 MMOL/L (ref 0.4–2)
LACTATE: 9.8 MMOL/L (ref 0.4–2)
LEUKOCYTE ESTERASE, URINE: ABNORMAL
LIPASE: 25 IU/L (ref 13–60)
LYMPHOCYTES ABSOLUTE: 1.7 K/CU MM
LYMPHOCYTES RELATIVE PERCENT: 16.8 % (ref 24–44)
MCH RBC QN AUTO: 22.1 PG (ref 27–31)
MCH RBC QN AUTO: 26.8 PG (ref 27–31)
MCHC RBC AUTO-ENTMCNC: 21.7 % (ref 32–36)
MCHC RBC AUTO-ENTMCNC: 29.6 % (ref 32–36)
MCV RBC AUTO: 101.9 FL (ref 78–100)
MCV RBC AUTO: 90.4 FL (ref 78–100)
MONOCYTES ABSOLUTE: 0.4 K/CU MM
MONOCYTES RELATIVE PERCENT: 3.5 % (ref 0–4)
MUCUS: ABNORMAL HPF
NITRITE URINE, QUANTITATIVE: NEGATIVE
NUCLEATED RBC %: 6.5 %
PDW BLD-RTO: 15.6 % (ref 11.7–14.9)
PDW BLD-RTO: 18 % (ref 11.7–14.9)
PH, URINE: 5 (ref 5–8)
PLATELET # BLD: 387 K/CU MM (ref 140–440)
PLATELET # BLD: 577 K/CU MM (ref 140–440)
PMV BLD AUTO: 10.1 FL (ref 7.5–11.1)
PMV BLD AUTO: 9.7 FL (ref 7.5–11.1)
POTASSIUM SERPL-SCNC: 4.6 MMOL/L (ref 3.5–5.1)
POTASSIUM SERPL-SCNC: 4.9 MMOL/L (ref 3.5–5.1)
POTASSIUM SERPL-SCNC: 5.2 MMOL/L (ref 3.5–5.1)
POTASSIUM SERPL-SCNC: 5.5 MMOL/L (ref 3.5–5.1)
PRO-BNP: 1914 PG/ML
PROTEIN UA: NEGATIVE MG/DL
PROTHROMBIN TIME: 30.4 SECONDS (ref 11.7–14.5)
RBC # BLD: 1.04 M/CU MM (ref 4.2–5.4)
RBC # BLD: 1.98 M/CU MM (ref 4.2–5.4)
RBC URINE: 2 /HPF (ref 0–6)
SALICYLATE LEVEL: <0.3 MG/DL (ref 15–30)
SEGMENTED NEUTROPHILS ABSOLUTE COUNT: 8 K/CU MM
SEGMENTED NEUTROPHILS RELATIVE PERCENT: 78.3 % (ref 36–66)
SODIUM BLD-SCNC: 139 MMOL/L (ref 135–145)
SODIUM BLD-SCNC: 140 MMOL/L (ref 135–145)
SPECIFIC GRAVITY UA: 1.02 (ref 1–1.03)
SQUAMOUS EPITHELIAL: 1 /HPF
TOTAL CK: 172 IU/L (ref 26–140)
TOTAL IMMATURE NEUTOROPHIL: 0.1 K/CU MM
TOTAL NUCLEATED RBC: 0.7 K/CU MM
TOTAL PROTEIN: 5.2 GM/DL (ref 6.4–8.2)
TRICHOMONAS: ABNORMAL /HPF
TROPONIN T: 0.07 NG/ML
TSH HIGH SENSITIVITY: 2.72 UIU/ML (ref 0.27–4.2)
UROBILINOGEN, URINE: NORMAL MG/DL (ref 0.2–1)
WBC # BLD: 10.2 K/CU MM (ref 4–10.5)
WBC # BLD: 9.7 K/CU MM (ref 4–10.5)
WBC UA: 2 /HPF (ref 0–5)

## 2020-05-10 PROCEDURE — 86901 BLOOD TYPING SEROLOGIC RH(D): CPT

## 2020-05-10 PROCEDURE — 2500000003 HC RX 250 WO HCPCS: Performed by: EMERGENCY MEDICINE

## 2020-05-10 PROCEDURE — 2580000003 HC RX 258: Performed by: NURSE PRACTITIONER

## 2020-05-10 PROCEDURE — 99285 EMERGENCY DEPT VISIT HI MDM: CPT

## 2020-05-10 PROCEDURE — 96367 TX/PROPH/DG ADDL SEQ IV INF: CPT

## 2020-05-10 PROCEDURE — 96375 TX/PRO/DX INJ NEW DRUG ADDON: CPT

## 2020-05-10 PROCEDURE — 2700000000 HC OXYGEN THERAPY PER DAY

## 2020-05-10 PROCEDURE — 82550 ASSAY OF CK (CPK): CPT

## 2020-05-10 PROCEDURE — U0002 COVID-19 LAB TEST NON-CDC: HCPCS

## 2020-05-10 PROCEDURE — 36430 TRANSFUSION BLD/BLD COMPNT: CPT

## 2020-05-10 PROCEDURE — 85018 HEMOGLOBIN: CPT

## 2020-05-10 PROCEDURE — 71275 CT ANGIOGRAPHY CHEST: CPT

## 2020-05-10 PROCEDURE — 82962 GLUCOSE BLOOD TEST: CPT

## 2020-05-10 PROCEDURE — 80048 BASIC METABOLIC PNL TOTAL CA: CPT

## 2020-05-10 PROCEDURE — 74177 CT ABD & PELVIS W/CONTRAST: CPT

## 2020-05-10 PROCEDURE — 6370000000 HC RX 637 (ALT 250 FOR IP): Performed by: NURSE PRACTITIONER

## 2020-05-10 PROCEDURE — G0480 DRUG TEST DEF 1-7 CLASSES: HCPCS

## 2020-05-10 PROCEDURE — 86850 RBC ANTIBODY SCREEN: CPT

## 2020-05-10 PROCEDURE — 94761 N-INVAS EAR/PLS OXIMETRY MLT: CPT

## 2020-05-10 PROCEDURE — 96365 THER/PROPH/DIAG IV INF INIT: CPT

## 2020-05-10 PROCEDURE — C9113 INJ PANTOPRAZOLE SODIUM, VIA: HCPCS | Performed by: EMERGENCY MEDICINE

## 2020-05-10 PROCEDURE — 6360000002 HC RX W HCPCS: Performed by: EMERGENCY MEDICINE

## 2020-05-10 PROCEDURE — 85025 COMPLETE CBC W/AUTO DIFF WBC: CPT

## 2020-05-10 PROCEDURE — 2580000003 HC RX 258: Performed by: EMERGENCY MEDICINE

## 2020-05-10 PROCEDURE — 96361 HYDRATE IV INFUSION ADD-ON: CPT

## 2020-05-10 PROCEDURE — 80053 COMPREHEN METABOLIC PANEL: CPT

## 2020-05-10 PROCEDURE — 2580000003 HC RX 258: Performed by: PHYSICIAN ASSISTANT

## 2020-05-10 PROCEDURE — 85014 HEMATOCRIT: CPT

## 2020-05-10 PROCEDURE — 6360000004 HC RX CONTRAST MEDICATION: Performed by: SPECIALIST

## 2020-05-10 PROCEDURE — 6370000000 HC RX 637 (ALT 250 FOR IP): Performed by: STUDENT IN AN ORGANIZED HEALTH CARE EDUCATION/TRAINING PROGRAM

## 2020-05-10 PROCEDURE — 86922 COMPATIBILITY TEST ANTIGLOB: CPT

## 2020-05-10 PROCEDURE — C9113 INJ PANTOPRAZOLE SODIUM, VIA: HCPCS | Performed by: NURSE PRACTITIONER

## 2020-05-10 PROCEDURE — 85027 COMPLETE CBC AUTOMATED: CPT

## 2020-05-10 PROCEDURE — 81001 URINALYSIS AUTO W/SCOPE: CPT

## 2020-05-10 PROCEDURE — 87040 BLOOD CULTURE FOR BACTERIA: CPT

## 2020-05-10 PROCEDURE — 6360000002 HC RX W HCPCS: Performed by: NURSE PRACTITIONER

## 2020-05-10 PROCEDURE — 83605 ASSAY OF LACTIC ACID: CPT

## 2020-05-10 PROCEDURE — 86900 BLOOD TYPING SEROLOGIC ABO: CPT

## 2020-05-10 PROCEDURE — 83880 ASSAY OF NATRIURETIC PEPTIDE: CPT

## 2020-05-10 PROCEDURE — 71045 X-RAY EXAM CHEST 1 VIEW: CPT

## 2020-05-10 PROCEDURE — P9016 RBC LEUKOCYTES REDUCED: HCPCS

## 2020-05-10 PROCEDURE — 84484 ASSAY OF TROPONIN QUANT: CPT

## 2020-05-10 PROCEDURE — 2100000000 HC CCU R&B

## 2020-05-10 PROCEDURE — 83690 ASSAY OF LIPASE: CPT

## 2020-05-10 PROCEDURE — 93005 ELECTROCARDIOGRAM TRACING: CPT | Performed by: EMERGENCY MEDICINE

## 2020-05-10 PROCEDURE — 84443 ASSAY THYROID STIM HORMONE: CPT

## 2020-05-10 PROCEDURE — 85610 PROTHROMBIN TIME: CPT

## 2020-05-10 PROCEDURE — C9132 KCENTRA, PER I.U.: HCPCS | Performed by: EMERGENCY MEDICINE

## 2020-05-10 RX ORDER — SODIUM CHLORIDE 0.9 % (FLUSH) 0.9 %
10 SYRINGE (ML) INJECTION PRN
Status: DISCONTINUED | OUTPATIENT
Start: 2020-05-10 | End: 2020-05-17 | Stop reason: HOSPADM

## 2020-05-10 RX ORDER — PANTOPRAZOLE SODIUM 40 MG/10ML
40 INJECTION, POWDER, LYOPHILIZED, FOR SOLUTION INTRAVENOUS ONCE
Status: COMPLETED | OUTPATIENT
Start: 2020-05-10 | End: 2020-05-10

## 2020-05-10 RX ORDER — 0.9 % SODIUM CHLORIDE 0.9 %
20 INTRAVENOUS SOLUTION INTRAVENOUS ONCE
Status: COMPLETED | OUTPATIENT
Start: 2020-05-10 | End: 2020-05-10

## 2020-05-10 RX ORDER — NICOTINE POLACRILEX 4 MG
15 LOZENGE BUCCAL PRN
Status: DISCONTINUED | OUTPATIENT
Start: 2020-05-10 | End: 2020-05-17 | Stop reason: HOSPADM

## 2020-05-10 RX ORDER — ANASTROZOLE 1 MG/1
1 TABLET ORAL DAILY
Status: DISCONTINUED | OUTPATIENT
Start: 2020-05-11 | End: 2020-05-17 | Stop reason: HOSPADM

## 2020-05-10 RX ORDER — ACETAMINOPHEN 650 MG/1
650 SUPPOSITORY RECTAL EVERY 6 HOURS PRN
Status: DISCONTINUED | OUTPATIENT
Start: 2020-05-10 | End: 2020-05-17 | Stop reason: HOSPADM

## 2020-05-10 RX ORDER — PROMETHAZINE HYDROCHLORIDE 25 MG/1
12.5 TABLET ORAL EVERY 6 HOURS PRN
Status: DISCONTINUED | OUTPATIENT
Start: 2020-05-10 | End: 2020-05-17 | Stop reason: HOSPADM

## 2020-05-10 RX ORDER — OXYCODONE HYDROCHLORIDE 5 MG/1
5 TABLET ORAL EVERY 4 HOURS PRN
Status: DISCONTINUED | OUTPATIENT
Start: 2020-05-10 | End: 2020-05-17 | Stop reason: HOSPADM

## 2020-05-10 RX ORDER — PRAVASTATIN SODIUM 40 MG
40 TABLET ORAL DAILY
Status: DISCONTINUED | OUTPATIENT
Start: 2020-05-10 | End: 2020-05-17 | Stop reason: HOSPADM

## 2020-05-10 RX ORDER — DILTIAZEM HYDROCHLORIDE 240 MG/1
240 CAPSULE, COATED, EXTENDED RELEASE ORAL DAILY
Status: DISCONTINUED | OUTPATIENT
Start: 2020-05-10 | End: 2020-05-17 | Stop reason: HOSPADM

## 2020-05-10 RX ORDER — DEXTROSE MONOHYDRATE 25 G/50ML
12.5 INJECTION, SOLUTION INTRAVENOUS PRN
Status: DISCONTINUED | OUTPATIENT
Start: 2020-05-10 | End: 2020-05-17 | Stop reason: HOSPADM

## 2020-05-10 RX ORDER — 0.9 % SODIUM CHLORIDE 0.9 %
500 INTRAVENOUS SOLUTION INTRAVENOUS ONCE
Status: COMPLETED | OUTPATIENT
Start: 2020-05-10 | End: 2020-05-10

## 2020-05-10 RX ORDER — ACETAMINOPHEN 325 MG/1
650 TABLET ORAL EVERY 6 HOURS PRN
Status: DISCONTINUED | OUTPATIENT
Start: 2020-05-10 | End: 2020-05-17 | Stop reason: HOSPADM

## 2020-05-10 RX ORDER — SODIUM POLYSTYRENE SULFONATE 15 G/60ML
15 SUSPENSION ORAL; RECTAL ONCE
Status: COMPLETED | OUTPATIENT
Start: 2020-05-10 | End: 2020-05-10

## 2020-05-10 RX ORDER — SODIUM CHLORIDE 9 MG/ML
50 INJECTION, SOLUTION INTRAVENOUS ONCE
Status: DISCONTINUED | OUTPATIENT
Start: 2020-05-10 | End: 2020-05-17 | Stop reason: HOSPADM

## 2020-05-10 RX ORDER — FERROUS SULFATE 325(65) MG
325 TABLET ORAL
Status: DISCONTINUED | OUTPATIENT
Start: 2020-05-11 | End: 2020-05-17 | Stop reason: HOSPADM

## 2020-05-10 RX ORDER — DEXTROSE MONOHYDRATE 50 MG/ML
100 INJECTION, SOLUTION INTRAVENOUS PRN
Status: DISCONTINUED | OUTPATIENT
Start: 2020-05-10 | End: 2020-05-17 | Stop reason: HOSPADM

## 2020-05-10 RX ORDER — HYDROCODONE BITARTRATE AND ACETAMINOPHEN 5; 325 MG/1; MG/1
1 TABLET ORAL ONCE
Status: DISCONTINUED | OUTPATIENT
Start: 2020-05-10 | End: 2020-05-10

## 2020-05-10 RX ORDER — METOPROLOL SUCCINATE 25 MG/1
25 TABLET, EXTENDED RELEASE ORAL 2 TIMES DAILY
Status: DISCONTINUED | OUTPATIENT
Start: 2020-05-10 | End: 2020-05-17 | Stop reason: HOSPADM

## 2020-05-10 RX ORDER — LEVOTHYROXINE SODIUM 0.05 MG/1
50 TABLET ORAL DAILY
Status: DISCONTINUED | OUTPATIENT
Start: 2020-05-11 | End: 2020-05-17 | Stop reason: HOSPADM

## 2020-05-10 RX ORDER — ONDANSETRON 2 MG/ML
4 INJECTION INTRAMUSCULAR; INTRAVENOUS EVERY 6 HOURS PRN
Status: DISCONTINUED | OUTPATIENT
Start: 2020-05-10 | End: 2020-05-17 | Stop reason: HOSPADM

## 2020-05-10 RX ORDER — PANTOPRAZOLE SODIUM 40 MG/10ML
40 INJECTION, POWDER, LYOPHILIZED, FOR SOLUTION INTRAVENOUS 2 TIMES DAILY
Status: CANCELLED | OUTPATIENT
Start: 2020-05-10

## 2020-05-10 RX ORDER — 0.9 % SODIUM CHLORIDE 0.9 %
250 INTRAVENOUS SOLUTION INTRAVENOUS ONCE
Status: COMPLETED | OUTPATIENT
Start: 2020-05-10 | End: 2020-05-10

## 2020-05-10 RX ORDER — PANTOPRAZOLE SODIUM 40 MG/10ML
40 INJECTION, POWDER, LYOPHILIZED, FOR SOLUTION INTRAVENOUS DAILY
Status: DISCONTINUED | OUTPATIENT
Start: 2020-05-10 | End: 2020-05-11

## 2020-05-10 RX ORDER — SODIUM CHLORIDE 0.9 % (FLUSH) 0.9 %
10 SYRINGE (ML) INJECTION EVERY 12 HOURS SCHEDULED
Status: DISCONTINUED | OUTPATIENT
Start: 2020-05-10 | End: 2020-05-17 | Stop reason: HOSPADM

## 2020-05-10 RX ORDER — POLYETHYLENE GLYCOL 3350 17 G/17G
17 POWDER, FOR SOLUTION ORAL DAILY PRN
Status: DISCONTINUED | OUTPATIENT
Start: 2020-05-10 | End: 2020-05-17 | Stop reason: HOSPADM

## 2020-05-10 RX ADMIN — SODIUM BICARBONATE 50 MEQ: 84 INJECTION INTRAVENOUS at 11:22

## 2020-05-10 RX ADMIN — PHYTONADIONE 10 MG: 10 INJECTION, EMULSION INTRAMUSCULAR; INTRAVENOUS; SUBCUTANEOUS at 12:28

## 2020-05-10 RX ADMIN — SODIUM POLYSTYRENE SULFONATE 15 G: 15 SUSPENSION ORAL; RECTAL at 16:48

## 2020-05-10 RX ADMIN — SODIUM CHLORIDE 250 ML: 9 INJECTION, SOLUTION INTRAVENOUS at 23:59

## 2020-05-10 RX ADMIN — PANTOPRAZOLE SODIUM 40 MG: 40 INJECTION, POWDER, FOR SOLUTION INTRAVENOUS at 15:59

## 2020-05-10 RX ADMIN — PROTHROMBIN, COAGULATION FACTOR VII HUMAN, COAGULATION FACTOR IX HUMAN, COAGULATION FACTOR X HUMAN, PROTEIN C, PROTEIN S HUMAN, AND WATER 3855 UNITS: KIT at 11:43

## 2020-05-10 RX ADMIN — SODIUM CHLORIDE 20 ML: 9 INJECTION, SOLUTION INTRAVENOUS at 11:19

## 2020-05-10 RX ADMIN — PRAVASTATIN SODIUM 40 MG: 40 TABLET ORAL at 21:02

## 2020-05-10 RX ADMIN — SODIUM CHLORIDE, PRESERVATIVE FREE 10 ML: 5 INJECTION INTRAVENOUS at 21:02

## 2020-05-10 RX ADMIN — OXYCODONE HYDROCHLORIDE 5 MG: 5 TABLET ORAL at 16:00

## 2020-05-10 RX ADMIN — PANTOPRAZOLE SODIUM 40 MG: 40 INJECTION, POWDER, FOR SOLUTION INTRAVENOUS at 12:25

## 2020-05-10 RX ADMIN — SODIUM CHLORIDE 500 ML: 9 INJECTION, SOLUTION INTRAVENOUS at 11:33

## 2020-05-10 RX ADMIN — METOPROLOL SUCCINATE 25 MG: 25 TABLET, EXTENDED RELEASE ORAL at 21:02

## 2020-05-10 RX ADMIN — IOPAMIDOL 75 ML: 755 INJECTION, SOLUTION INTRAVENOUS at 12:17

## 2020-05-10 RX ADMIN — SODIUM CHLORIDE 20 ML: 9 INJECTION, SOLUTION INTRAVENOUS at 11:57

## 2020-05-10 ASSESSMENT — PAIN SCALES - GENERAL
PAINLEVEL_OUTOF10: 0
PAINLEVEL_OUTOF10: 9
PAINLEVEL_OUTOF10: 0
PAINLEVEL_OUTOF10: 4
PAINLEVEL_OUTOF10: 0
PAINLEVEL_OUTOF10: 0
PAINLEVEL_OUTOF10: 2
PAINLEVEL_OUTOF10: 10
PAINLEVEL_OUTOF10: 0

## 2020-05-10 ASSESSMENT — PAIN DESCRIPTION - PAIN TYPE
TYPE: CHRONIC PAIN
TYPE: ACUTE PAIN
TYPE: CHRONIC PAIN
TYPE: CHRONIC PAIN

## 2020-05-10 ASSESSMENT — PAIN DESCRIPTION - ONSET
ONSET: ON-GOING

## 2020-05-10 ASSESSMENT — PAIN - FUNCTIONAL ASSESSMENT
PAIN_FUNCTIONAL_ASSESSMENT: ACTIVITIES ARE NOT PREVENTED

## 2020-05-10 ASSESSMENT — PAIN DESCRIPTION - FREQUENCY
FREQUENCY: CONTINUOUS

## 2020-05-10 ASSESSMENT — PAIN DESCRIPTION - ORIENTATION
ORIENTATION: LOWER
ORIENTATION: LOWER
ORIENTATION: RIGHT;LEFT;LOWER;MID
ORIENTATION: LOWER

## 2020-05-10 ASSESSMENT — PAIN DESCRIPTION - DESCRIPTORS
DESCRIPTORS: ACHING;DISCOMFORT
DESCRIPTORS: ACHING;DISCOMFORT
DESCRIPTORS: ACHING;CONSTANT;DISCOMFORT
DESCRIPTORS: ACHING;DISCOMFORT

## 2020-05-10 ASSESSMENT — PAIN DESCRIPTION - LOCATION
LOCATION: BACK
LOCATION: ABDOMEN

## 2020-05-10 ASSESSMENT — PAIN DESCRIPTION - PROGRESSION
CLINICAL_PROGRESSION: GRADUALLY WORSENING

## 2020-05-10 NOTE — CONSULTS
was by me on  06/04/2016 and the patient was noted to have diverticulosis coli, 8 mm  polyp was noted in the cecum and also hemorrhoids and melanosis coli was  noted. The biopsy of the polyp was tubular adenoma. 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 The  biopsies from the Lawler's segment showed a rare intraepithelial  eosinophil suggestive of reflux esophagitis and mild chronic gastritis  was noted. The biopsies from the duodenum were negative for celiac  disease. The patient also had a small bowel follow-through done on  06/30/2016, which was unremarkable. REVIEW OF SYSTEMS:  CENTRAL NERVOUS SYSTEM:  The patient denies headache or focal  sensorimotor symptoms. CARDIOVASCULAR SYSTEM:  The patient complains of shortness of breath and  mild leg swelling. GENITOURINARY SYSTEM:  No history of dysuria, pyuria, or hematuria. MUSCULOSKELETAL SYSTEM:  The patient complains of generalized weakness. RESPIRATORY SYSTEM:  No history of cough, hemoptysis, fever, or chills. PAST MEDICAL HISTORY:  Significant for history of hypertension, diabetes  mellitus, hyperlipidemia, carcinoma of the left breast, status post  lumpectomy in 2009 with radiation therapy, also history of colon polyps,  Lawler esophagus and anemia requiring blood transfusions. FAMILY HISTORY:  The patient's sister was diagnosed with carcinoma of  the pancreas. MEDICATIONS:  Please refer to the chart. The patient is taking Eliquis  at present. SOCIOECONOMIC HISTORY:  No history of EtOH abuse. The patient does not  smoke cigarettes. PAST SURGICAL HISTORY:  The patient has had tubal ligation done and left  breast lumpectomy done for carcinoma by Dr. Wander Downing in 09/2009, also had  a tonsillectomy done and cholecystectomy on 09/28/2019.     ALLERGIES: The patient has seasonal allergies. PHYSICAL EXAMINATION:  GENERAL:  Shows a 71-year-old white female, who is lying flat in bed,  slightly short of breath. She is awake, alert, and oriented and  pleasant to talk with. VITAL SIGNS:  Stable. HEENT:  Shows conjunctivae to be pale. NECK:  Supple. CHEST:  Shows diminished breath sounds. HEART:  S1 and S2 are normal.  ABDOMEN:  Soft, nondistended with mild lower abdominal tenderness. No  guarding or rigidity. Liver and spleen are not palpable. Bowel sounds  are present. RECTAL:  Deferred. CNS:  Shows the patient to be awake, alert and oriented. There are no  focal sensorimotor signs. MUSCULOSKELETAL SYSTEM:  Shows evidence of degenerative joint disease  changes. LABORATORY DATA:  The labs drawn today comprised of chem profile, which  is remarkable for a potassium of 5.5, BUN is 38, creatinine 1.1. LFTs  are within normal limits. CBC shows a WBC count of 10.2, hemoglobin  2.3, platelet count 713,950 and the patient's INR is 2.49. IMPRESSION:  A 71-year-old white female with multiple comorbidities  including anemia, requiring blood transfusion in the past, presents with  shortness of breath, lower abdominal pain and severe anemia, rule out GI  bleeding, source upper versus lower gastrointestinal tract. RECOMMENDATIONS:  1. Agree with present management with Protonix. 2.  Transfuse on a p.r.n. basis to keep hemoglobin above 7 gm percent. 3.  In fact, once the patient's global condition improves, we will  proceed with EGD, colonoscopy and small bowel evaluation also for workup  of the patient's severe anemia. 4.  We will hold Eliquis for now. 5.  The case and plan have been discussed in detail with the patient.         Vignesh Allred MD    D: 05/10/2020 13:09:54       T: 05/10/2020 13:52:40     [id number]  Job#: [id number]     Doc#: [id number]    CC:  Florin Mills MD

## 2020-05-10 NOTE — ED NOTES
Dr Ira Samaniego notified of pt vital signs. PRBC transfusion placed on pressure bag per ok from DR. Ira Samaniego.      Conception CONNER Marrufo  05/10/20 2829

## 2020-05-10 NOTE — ED NOTES
Swallow screen deferred due to pt NPO. Pt presented to ED [facility] from home for c/o feeling unwell, weak and SOB. Pt states she has been SOB for \"a while\" but got significantly worse throughout the night. Pt endorses intermittent dry cough. Denied fevers or chills. Pt denies n/v/d. Paige chest pain. Pt does report mid to lower back pain. No new injuries. Pt arrives a&ox4. Respirations are even and labored. abd muscle use noted. Pt speaks shortened sentences. bipap attempted on arrival however pt was unable to tolerate. Pt denies abd pain to this RN. Pt only c/o is SOB and back pain. Pt denies hematemesis or noticeably dark stools. Pt is on eliquis. Pt placed on 6L NC on arrival by RT. Pt does not normally wear home oxygen. Pt titrated to 4L NC at this time and is tolerating well with sp02 100%. Pt pale, skin cool and clammy. Pt reports some improvements in SOB since blood administration. Second unit of blood continues to infuse via pressure bag at this time d/t pt hypotension. Guerrero draining clear yellow urine. Pt denies needs at this time. Lights dimmed for comfort. CCM and CPOX in place. RN will cont. To monitor. Call light in reach.       Cassie Wallace RN  05/10/20 4096

## 2020-05-10 NOTE — H&P
History and Physical      Name:  Bala Cardenas /Age/Sex: 1945  (69 y.o. female)   MRN & CSN:  [id number] & [id number] Admission Date/Time: 5/10/2020  9:40 AM   Location:  [place] [address] PCP: Sanchez Hamilton MD       Discussed patient with Dr. Sivakumar Burnett and Plan:     Bala Cardenas is a 69 y.o.  female  who presents with shortness of breath, abdominal pain    - Anemia, acute on chronic, with acidosis  hgb 2.3 (baseline 7.5)  + occult in ED; on eliquis  CT a/p- no acute process  Received kcentra and vitamin K in ED  Transfusing 2 u in ED; 2 more on hold for ED- transfusion goal of hgb 7.0 or >   Serial H &H, BMP, lactic  GI consult, Dr Alicia Vera  CLD per [facility]GI      - Hyperkalemia  5.5  Kayexalate x 1 and serial BMP    - Shortness of breath  2/2 to above  Transitioned from Bipap to NC in ED  CTA chest negative for PE  CXR without acute process  Denies hx of respiratory sx, fever/chills    - Elevated trop  0.075  2/2 demand/low perfusion status  Denies CP  Serial trop        Chronic  - Atrial fibrillation- Takes BB, dilt, eliquis- holding  - HLD- Cont statin  - Hypothyroidism- Cont synthroid  - DM- SSI; holding home meds  - Hx of breast ca- remote; cont anastrozole    Discussed patient with ER physician     Diet CLD   DVT Prophylaxis [] Lovenox, []  Heparin, [x] SCDs, [] Ambulation   GI Prophylaxis [x] PPI,  [] H2 Blocker,  [] Carafate,  [] Diet/Tube Feeds   Code Status Full   Disposition Patient requires continued admission due to shortness of breath   MDM [] Low, [] Moderate,[x]  High  Patient's risk as above due to      [] One or more chronic illnesses with exacerbation progression      [x] Two or more stable chronic illnesses      [x] Undiagnosed new problem with uncertain prognosis      [] Elective major surgery      []Prescription drug management       History of Present Illness:     Chief Complaint: Shortness of breath, abdominal pain    Bala Cardenas is a 69 y.o.  female  who presents with the above complaints, onset progressive over several weeks. Patient denies hematemesis, melanotic stool. Has chronic anemia and is on eliquis for afib. Denies chest pain. Presented due to sob and diffuse abdominal pain. Denies hx of ulcer. Denies recent respiratory sx, fever/chills, known sick contacts. Denies n/v/d. Confirms code status. Denies regular alcohol use. Patient lives with brother and sister in law. Ten point ROS reviewed negative, unless as noted above    Objective: Intake/Output Summary (Last 24 hours) at 5/10/2020 1250  Last data filed at 5/10/2020 1157  Gross per 24 hour   Intake 2000 ml   Output --   Net 2000 ml        Vitals:   Vitals:    05/10/20 1236   BP: 137/71   Pulse: 116   Resp: 21   Temp: 97.6   SpO2: 100%       Physical Exam:     GEN Awake female, sitting upright in bed in no apparent distress. Appears given age. EYES Pupils are equally round. No scleral erythema, discharge, or conjunctivitis. HENT Mucous membranes are moist. Oral pharynx without exudates, no evidence of thrush. NECK Supple, no apparent thyromegaly or masses. RESP Clear to auscultation, no wheezes, rales or rhonchi. Symmetric chest movement while on 4 L NC  CARDIO/VASC S1/S2 auscultated. Regular rate without appreciable murmurs, rubs, or gallops. No JVD or carotid bruits. Peripheral pulses equal bilaterally and palpable. No peripheral edema. GI Abdomen is soft without significant tenderness, masses, or guarding. Bowel sounds are normoactive. Rectal exam deferred.  No costovertebral angle tenderness. Guerrero catheter is present. HEME/LYMPH No palpable cervical lymphadenopathy and no hepatosplenomegaly. No petechiae or ecchymoses. MSK No gross joint deformities. Strength equal in BLE and BUE  SKIN Normal coloration, warm, dry. NEURO Cranial nerves appear grossly intact, normal speech, no lateralizing weakness. PSYCH Awake, alert, oriented x 4. Affect appropriate.     Past Medical History: Past Medical History:   Diagnosis Date    Arthritis     Cancer [facility])        PSHX:  has a past surgical history that includes Tonsillectomy; Tubal ligation; Breast surgery; and Cholecystectomy, laparoscopic (N/A, 9/28/2019). Allergies: Allergies   Allergen Reactions    Seasonal Other (See Comments)     coughing       FAM HX: family history includes Arthritis in her father and mother; Heart Disease in her mother. Soc HX:   Social History     Socioeconomic History    Marital status:      Spouse name: None    Number of children: 3    Years of education: None    Highest education level: None   Occupational History    None   Social Needs    Financial resource strain: None    Food insecurity     Worry: None     Inability: None    Transportation needs     Medical: None     Non-medical: None   Tobacco Use    Smoking status: Never Smoker    Smokeless tobacco: Never Used   Substance and Sexual Activity    Alcohol use: No    Drug use: No    Sexual activity: Not Currently   Lifestyle    Physical activity     Days per week: None     Minutes per session: None    Stress: None   Relationships    Social connections     Talks on phone: None     Gets together: None     Attends Druze service: None     Active member of club or organization: None     Attends meetings of clubs or organizations: None     Relationship status: None    Intimate partner violence     Fear of current or ex partner: None     Emotionally abused: None     Physically abused: None     Forced sexual activity: None   Other Topics Concern    None   Social History Narrative    None       Medications:     Medications:    sodium chloride  20 mL Intravenous Once    sodium chloride  20 mL Intravenous Once    phytonadione (VITAMIN K)  IVPB  10 mg Intravenous Once      HYDROcodone-acetaminophen (NORCO) 5-325 MG per tablet Take 1 tablet by mouth every 6 hours as needed for Pain.  Historical Provider, MD Needs Review   apixaban (ELIQUIS) 5 MG TABS tablet Take 1 tablet by mouth 2 times daily Collette Freeman, MD Needs Review   levothyroxine (SYNTHROID) 50 MCG tablet Take 1 tablet by mouth Daily Collette Freeman, MD Needs Review   diltiazem (CARTIA XT) 240 MG extended release capsule Take 1 capsule by mouth daily Collette Freeman, MD Needs Review   pravastatin (PRAVACHOL) 40 MG tablet Take 40 mg by mouth daily Historical Provider, MD Needs Review   ferrous sulfate 325 (65 Fe) MG tablet Take 325 mg by mouth daily (with breakfast) Historical Provider, MD Needs Review   omeprazole (PRILOSEC) 20 MG capsule Take 20 mg by mouth daily.   Historical Provider, MD Needs Review   metoprolol (TOPROL-XL) 25 MG XL tablet Take 25 mg by mouth 2 times daily  Historical Provider, MD Needs Review   anastrozole (ARIMIDEX) 1 MG tablet Take 1 mg by mouth daily.   Historical Provider, MD Needs Review       Data:     CTA PULMONARY W CONTRAST [[id number]] Collected: 05/10/20 1243      Order Status: Completed Updated: 05/10/20 1250     Impression:       Negative CTA for pulmonary embolus    Small pleural effusions with left basilar atelectasis with reflux of contrast  in the hepatic veins indicating right heart dysfunction. No acute process of the abdomen or pelvis. Large hiatal hernia     CT ABDOMEN PELVIS W IV CONTRAST [[id number]] Collected: 05/10/20 1243     Order Status: No result Updated: 05/10/20 1250     XR CHEST PORTABLE [[id number]] Collected: 05/10/20 1023     Order Status: Completed Updated: 05/10/20 1150     Narrative:       EXAMINATION:  ONE X-RAY VIEW OF THE CHEST    5/10/2020 9:50 am    COMPARISON:  09/25/2019    HISTORY:  ORDERING SYSTEM PROVIDED HISTORY: Shortness of breath  TECHNOLOGIST PROVIDED HISTORY:  Reason for exam:->Shortness of breath  Reason for Exam: Shortness of breath  Acuity: Acute  Type of Exam: Initial  Additional signs and symptoms: Female who presents with shortness of breath.   She states increasing shortness of breath starting this morning.  Reports  shortness of breath is constant.  She does not wear oxygen at home normally. FINDINGS:  The heart is stable in size and configuration.  Large hiatal hernia is  present.  The lungs are clear.  Shoulder arthritis is noted.  Surgical clips  are seen in the left axillary region.     Impression:       No acute cardiopulmonary disease.     Large hiatal hernia.         Electronically signed by LORENZO Antoine NP on 5/10/2020 at 12:50 PM

## 2020-05-10 NOTE — ED NOTES
This RN and Mushtaq Taylor RN unable to obtain PIV access. Dr. Alphonso Rodriguez at bedside to place CVC. Pt in agreement.       Brain Corporal, RN  05/10/20 1372

## 2020-05-10 NOTE — ED NOTES
Bed: 03TR-03  Expected date:   Expected time:   Means of arrival:   Comments:  [facility] [address] [place] [address]  05/10/20 5265

## 2020-05-10 NOTE — PROGRESS NOTES
Patient arrived to room 2005 at 14:14pm awake and alert x 4. Vital signs HR- 122 (ST), BP-108/62 (77), Temp- 98.3, SpO2 - 100 % on 2 liters nasal canula, RR- 19. Skin assessment- scattered bruising, scratches bilateral hips, redden coccyx, redden heels bilateral, left hand bruising. CVC in right groin all saline locked.

## 2020-05-10 NOTE — ED NOTES
Pt remains free of s/s of transfusion reaction. RN at bedside for rectal exam by Dr. Nicolasa Saucedo. Pt hemoccult +.       Benedicto Seaman RN  05/10/20 6373

## 2020-05-10 NOTE — ED PROVIDER NOTES
eMERGENCY dEPARTMENT eNCOUnter      PCP: Tito Larson MD    CHIEF COMPLAINT    Chief Complaint   Patient presents with    Abdominal Pain     THIS AM, W/ INCREASED SOB       HPI    Francois Zuleta is a 69 y.o. female who presents with shortness of breath. She states increasing shortness of breath starting this morning. Reports shortness of breath is constant. She does not wear oxygen at home normally. Also reports lower abdominal pain. Denies cough, chest pain, swelling, fever, chills. She denies recent illness. She denies any bleeding from anywhere. Denies black stool. She states she thinks she is on an anticoagulant. REVIEW OF SYSTEMS    Constitutional:  Denies fever, chills. HENT:  Denies sore throat or ear pain   Cardiovascular:  Denies chest pain, palpitations   Respiratory:  Denies cough, + shortness of breath    GI:  + abdominal pain, denies nausea, vomiting, or diarrhea  :  Denies any urinary symptoms, flank pain  Musculoskeletal:  Denies back pain, extremity pain  Skin:  Denies rash, color change  Neurologic:  Denies headache, focal weakness or sensory changes   Lymphatic:  Denies swollen glands, edema    All other review of systems are negative  See HPI and nursing notes for additional information     PAST MEDICAL AND SURGICAL HISTORY    Past Medical History:   Diagnosis Date    Arthritis     Cancer [facility])      Past Surgical History:   Procedure Laterality Date    BREAST SURGERY      CHOLECYSTECTOMY, LAPAROSCOPIC N/A 9/28/2019    CHOLECYSTECTOMY LAPAROSCOPIC performed by Philip Gitelman, MD at [facility]    Current Outpatient Rx   Medication Sig Dispense Refill    HYDROcodone-acetaminophen ([address]) 5-325 MG per tablet Take 1 tablet by mouth every 6 hours as needed for Pain.       apixaban (ELIQUIS) 5 MG TABS tablet Take 1 tablet by mouth 2 times daily 60 tablet     levothyroxine (SYNTHROID) 50 MCG tablet Take 1 results reviewed with patient at bedside. The patient and/or the family were informed of the results of any tests/labs/imaging, the treatment plan, and time was allotted to answer questions. Procedure Note - Central Line:  The benefits, risks, and alternatives of central venous access were discussed with the  patient and family and written consent was obtained for the procedure. Dilcia Buitrago was prepped and draped in standard bedside fashion in the right groin. Physical landmarks with ultrasound guidance was used to locate the central vein. Local anesthesia with 2ml of 1% lidocaine was injected. Seldinger technique was used for placement of the CVC in a non-pulsatile vasculature. All ports damian and flushed. The patient tolerated the procedure well and no acute complications occurred. Groin was chosen as side rather than in the neck and given that the patient is having increased respiratory effort, was not able to lay all the way flat and vessel was very collapsible with respirations. 30-year-old female who presented with shortness of breath. On arrival appeared to be in distress, had increased respiratory rate. BiPAP was initially started, patient did not tolerate that, also was able to get the venous blood gas back and showed that this was mostly metabolic and her increased respiratory rate was likely compensation for this. Labs returned and found to have a hemoglobin of 2.3. Blood was ordered. It was rapidly transfused given that the patient's blood pressure did drop to 60 systolic. Blood pressure is rising at this time with transfusion of blood. 2 more units were ordered on hold. Lactate is elevated, BNP, troponin detectable, these are likely result from poor perfusion given the lack of hemoglobin and oxygen carrying capacity. She is taking Eliquis, has guaiac positive stool on exam, thus Krystin Pawelk was ordered.   Bedside FAST exam done to rule out intra-abdominal free fluid, no evidence of this on limited FAST exam, unable to fully visualize the entire right kidney given habitus and inability to cooperate with exam.  Will consult GI given the guaiac positive stool with hemoglobin of 2.3. Protonix was ordered as per history appears that she has had a history of gastritis and Lawler's esophagus. We will plan for admission to the ICU for further evaluation and care. Due to the immediate potential for life-threatening deterioration due to anemia, I spent 60 minutes providing critical care. This time is excluding time spent performing procedures.       Clinical  IMPRESSION    Anemia, lactic acidosis, respiratory distress, Hemoccult positive stool          (Please note the MDM and HPI sections of this note were completed with a voice recognition program.  Efforts were made to edit the dictations but occasionally words are mis-transcribed.)      Nneka Parker, DO  05/10/20 1054

## 2020-05-10 NOTE — ED NOTES
Pt unable to tolerate bipap. Dr. Maranda Ortega aware. Attempting IV Access at this time. Blood specimens obtained. Pt remains awake and alert.       Adama Presley RN  05/10/20 101

## 2020-05-10 NOTE — ED NOTES
hospitalist at bedside. Blood transfusion complete at this time. No s/s of transfusion reaction. Pt reports significant improvement in SOB.       Priya Kingsley RN  05/10/20 7448

## 2020-05-10 NOTE — ED NOTES
Pt remains without s/s of transfusion reaction at this time. Tolerating blood infusion well. Pt remains awake and alert. Given mouth swabs for comfort. Denies further needs. CCM and CPOX in place. RN will cont. To monitor.       Enoc Zelaya RN  05/10/20 1587

## 2020-05-11 LAB
ABO/RH: NORMAL
ALBUMIN SERPL-MCNC: 2.8 GM/DL (ref 3.4–5)
ALP BLD-CCNC: 49 IU/L (ref 40–128)
ALT SERPL-CCNC: 21 U/L (ref 10–40)
AMYLASE: 107 U/L (ref 25–115)
ANION GAP SERPL CALCULATED.3IONS-SCNC: 10 MMOL/L (ref 4–16)
ANION GAP SERPL CALCULATED.3IONS-SCNC: 9 MMOL/L (ref 4–16)
ANION GAP SERPL CALCULATED.3IONS-SCNC: 9 MMOL/L (ref 4–16)
ANTIBODY SCREEN: NEGATIVE
APTT: 31.1 SECONDS (ref 25.1–37.1)
AST SERPL-CCNC: 24 IU/L (ref 15–37)
BASOPHILS ABSOLUTE: 0.1 K/CU MM
BASOPHILS RELATIVE PERCENT: 0.9 % (ref 0–1)
BILIRUB SERPL-MCNC: 2.2 MG/DL (ref 0–1)
BUN BLDV-MCNC: 25 MG/DL (ref 6–23)
BUN BLDV-MCNC: 27 MG/DL (ref 6–23)
BUN BLDV-MCNC: 30 MG/DL (ref 6–23)
CALCIUM SERPL-MCNC: 7.8 MG/DL (ref 8.3–10.6)
CALCIUM SERPL-MCNC: 7.8 MG/DL (ref 8.3–10.6)
CALCIUM SERPL-MCNC: 7.9 MG/DL (ref 8.3–10.6)
CHLORIDE BLD-SCNC: 107 MMOL/L (ref 99–110)
CHLORIDE BLD-SCNC: 109 MMOL/L (ref 99–110)
CHLORIDE BLD-SCNC: 111 MMOL/L (ref 99–110)
CO2: 17 MMOL/L (ref 21–32)
CO2: 18 MMOL/L (ref 21–32)
CO2: 19 MMOL/L (ref 21–32)
COMPONENT: NORMAL
CREAT SERPL-MCNC: 1 MG/DL (ref 0.6–1.1)
CROSSMATCH RESULT: NORMAL
DIFFERENTIAL TYPE: ABNORMAL
EOSINOPHILS ABSOLUTE: 0.1 K/CU MM
EOSINOPHILS RELATIVE PERCENT: 0.8 % (ref 0–3)
GFR AFRICAN AMERICAN: >60 ML/MIN/1.73M2
GFR NON-AFRICAN AMERICAN: 54 ML/MIN/1.73M2
GLUCOSE BLD-MCNC: 101 MG/DL (ref 70–99)
GLUCOSE BLD-MCNC: 113 MG/DL (ref 70–99)
GLUCOSE BLD-MCNC: 122 MG/DL (ref 70–99)
GLUCOSE BLD-MCNC: 127 MG/DL (ref 70–99)
GLUCOSE BLD-MCNC: 131 MG/DL (ref 70–99)
GLUCOSE BLD-MCNC: 133 MG/DL (ref 70–99)
GLUCOSE BLD-MCNC: 99 MG/DL (ref 70–99)
HCT VFR BLD CALC: 24.7 % (ref 37–47)
HCT VFR BLD CALC: 24.9 % (ref 37–47)
HCT VFR BLD CALC: 25.1 % (ref 37–47)
HCT VFR BLD CALC: 25.8 % (ref 37–47)
HEMOGLOBIN: 7.7 GM/DL (ref 12.5–16)
HEMOGLOBIN: 7.8 GM/DL (ref 12.5–16)
HEMOGLOBIN: 7.9 GM/DL (ref 12.5–16)
HEMOGLOBIN: 8.1 GM/DL (ref 12.5–16)
IMMATURE NEUTROPHIL %: 0.4 % (ref 0–0.43)
INR BLD: 1.32 INDEX
LIPASE: 18 IU/L (ref 13–60)
LYMPHOCYTES ABSOLUTE: 1.1 K/CU MM
LYMPHOCYTES RELATIVE PERCENT: 14 % (ref 24–44)
MCH RBC QN AUTO: 27.4 PG (ref 27–31)
MCHC RBC AUTO-ENTMCNC: 31.6 % (ref 32–36)
MCV RBC AUTO: 86.7 FL (ref 78–100)
MONOCYTES ABSOLUTE: 0.5 K/CU MM
MONOCYTES RELATIVE PERCENT: 6.7 % (ref 0–4)
NUCLEATED RBC %: 1.6 %
PDW BLD-RTO: 15.7 % (ref 11.7–14.9)
PLATELET # BLD: 347 K/CU MM (ref 140–440)
PMV BLD AUTO: 9.6 FL (ref 7.5–11.1)
POTASSIUM SERPL-SCNC: 4.5 MMOL/L (ref 3.5–5.1)
POTASSIUM SERPL-SCNC: 4.8 MMOL/L (ref 3.5–5.1)
POTASSIUM SERPL-SCNC: 4.8 MMOL/L (ref 3.5–5.1)
PROTHROMBIN TIME: 16 SECONDS (ref 11.7–14.5)
RBC # BLD: 2.85 M/CU MM (ref 4.2–5.4)
SEGMENTED NEUTROPHILS ABSOLUTE COUNT: 6.2 K/CU MM
SEGMENTED NEUTROPHILS RELATIVE PERCENT: 77.2 % (ref 36–66)
SODIUM BLD-SCNC: 134 MMOL/L (ref 135–145)
SODIUM BLD-SCNC: 137 MMOL/L (ref 135–145)
SODIUM BLD-SCNC: 138 MMOL/L (ref 135–145)
STATUS: NORMAL
TOTAL IMMATURE NEUTOROPHIL: 0.03 K/CU MM
TOTAL NUCLEATED RBC: 0.1 K/CU MM
TOTAL PROTEIN: 4.7 GM/DL (ref 6.4–8.2)
TRANSFUSION STATUS: NORMAL
TROPONIN T: 0.09 NG/ML
TROPONIN T: 0.09 NG/ML
UNIT DIVISION: 0
UNIT NUMBER: NORMAL
WBC # BLD: 8 K/CU MM (ref 4–10.5)

## 2020-05-11 PROCEDURE — 85014 HEMATOCRIT: CPT

## 2020-05-11 PROCEDURE — C9113 INJ PANTOPRAZOLE SODIUM, VIA: HCPCS | Performed by: NURSE PRACTITIONER

## 2020-05-11 PROCEDURE — 2580000003 HC RX 258: Performed by: NURSE PRACTITIONER

## 2020-05-11 PROCEDURE — 36430 TRANSFUSION BLD/BLD COMPNT: CPT

## 2020-05-11 PROCEDURE — 6370000000 HC RX 637 (ALT 250 FOR IP): Performed by: STUDENT IN AN ORGANIZED HEALTH CARE EDUCATION/TRAINING PROGRAM

## 2020-05-11 PROCEDURE — 93010 ELECTROCARDIOGRAM REPORT: CPT | Performed by: INTERNAL MEDICINE

## 2020-05-11 PROCEDURE — 6360000002 HC RX W HCPCS: Performed by: HOSPITALIST

## 2020-05-11 PROCEDURE — 82150 ASSAY OF AMYLASE: CPT

## 2020-05-11 PROCEDURE — 6360000002 HC RX W HCPCS: Performed by: NURSE PRACTITIONER

## 2020-05-11 PROCEDURE — 1200000000 HC SEMI PRIVATE

## 2020-05-11 PROCEDURE — 80048 BASIC METABOLIC PNL TOTAL CA: CPT

## 2020-05-11 PROCEDURE — 83690 ASSAY OF LIPASE: CPT

## 2020-05-11 PROCEDURE — 85610 PROTHROMBIN TIME: CPT

## 2020-05-11 PROCEDURE — 2000000000 HC ICU R&B

## 2020-05-11 PROCEDURE — 36592 COLLECT BLOOD FROM PICC: CPT

## 2020-05-11 PROCEDURE — 85025 COMPLETE CBC W/AUTO DIFF WBC: CPT

## 2020-05-11 PROCEDURE — 6370000000 HC RX 637 (ALT 250 FOR IP): Performed by: NURSE PRACTITIONER

## 2020-05-11 PROCEDURE — 85730 THROMBOPLASTIN TIME PARTIAL: CPT

## 2020-05-11 PROCEDURE — 84484 ASSAY OF TROPONIN QUANT: CPT

## 2020-05-11 PROCEDURE — C9113 INJ PANTOPRAZOLE SODIUM, VIA: HCPCS | Performed by: HOSPITALIST

## 2020-05-11 PROCEDURE — 82962 GLUCOSE BLOOD TEST: CPT

## 2020-05-11 PROCEDURE — 85018 HEMOGLOBIN: CPT

## 2020-05-11 PROCEDURE — 80053 COMPREHEN METABOLIC PANEL: CPT

## 2020-05-11 RX ORDER — PANTOPRAZOLE SODIUM 40 MG/10ML
40 INJECTION, POWDER, LYOPHILIZED, FOR SOLUTION INTRAVENOUS 2 TIMES DAILY
Status: DISCONTINUED | OUTPATIENT
Start: 2020-05-11 | End: 2020-05-17 | Stop reason: HOSPADM

## 2020-05-11 RX ADMIN — PANTOPRAZOLE SODIUM 40 MG: 40 INJECTION, POWDER, FOR SOLUTION INTRAVENOUS at 08:44

## 2020-05-11 RX ADMIN — FERROUS SULFATE TAB 325 MG (65 MG ELEMENTAL FE) 325 MG: 325 (65 FE) TAB at 08:44

## 2020-05-11 RX ADMIN — METOPROLOL SUCCINATE 25 MG: 25 TABLET, EXTENDED RELEASE ORAL at 08:44

## 2020-05-11 RX ADMIN — SODIUM CHLORIDE, PRESERVATIVE FREE 10 ML: 5 INJECTION INTRAVENOUS at 08:44

## 2020-05-11 RX ADMIN — SODIUM CHLORIDE, PRESERVATIVE FREE 10 ML: 5 INJECTION INTRAVENOUS at 20:29

## 2020-05-11 RX ADMIN — PANTOPRAZOLE SODIUM 40 MG: 40 INJECTION, POWDER, FOR SOLUTION INTRAVENOUS at 18:34

## 2020-05-11 RX ADMIN — OXYCODONE HYDROCHLORIDE 5 MG: 5 TABLET ORAL at 04:56

## 2020-05-11 RX ADMIN — DILTIAZEM HYDROCHLORIDE 240 MG: 240 CAPSULE, COATED, EXTENDED RELEASE ORAL at 08:44

## 2020-05-11 RX ADMIN — ANASTROZOLE 1 MG: 1 TABLET, COATED ORAL at 08:52

## 2020-05-11 RX ADMIN — SODIUM CHLORIDE, PRESERVATIVE FREE 10 ML: 5 INJECTION INTRAVENOUS at 18:35

## 2020-05-11 RX ADMIN — LEVOTHYROXINE SODIUM 50 MCG: 50 TABLET ORAL at 08:52

## 2020-05-11 RX ADMIN — PRAVASTATIN SODIUM 40 MG: 40 TABLET ORAL at 08:44

## 2020-05-11 RX ADMIN — OXYCODONE HYDROCHLORIDE 5 MG: 5 TABLET ORAL at 23:01

## 2020-05-11 ASSESSMENT — PAIN SCALES - GENERAL
PAINLEVEL_OUTOF10: 0
PAINLEVEL_OUTOF10: 10
PAINLEVEL_OUTOF10: 10
PAINLEVEL_OUTOF10: 0
PAINLEVEL_OUTOF10: 10
PAINLEVEL_OUTOF10: 0

## 2020-05-11 ASSESSMENT — PAIN DESCRIPTION - LOCATION
LOCATION: BACK;LEG
LOCATION: BACK;LEG

## 2020-05-11 ASSESSMENT — PAIN DESCRIPTION - ONSET
ONSET: GRADUAL
ONSET: ON-GOING

## 2020-05-11 ASSESSMENT — PAIN DESCRIPTION - ORIENTATION
ORIENTATION: LOWER
ORIENTATION: LOWER;RIGHT;LEFT

## 2020-05-11 ASSESSMENT — PAIN DESCRIPTION - FREQUENCY
FREQUENCY: CONTINUOUS
FREQUENCY: CONTINUOUS

## 2020-05-11 ASSESSMENT — PAIN DESCRIPTION - PROGRESSION
CLINICAL_PROGRESSION: GRADUALLY WORSENING
CLINICAL_PROGRESSION: NOT CHANGED

## 2020-05-11 ASSESSMENT — PAIN DESCRIPTION - PAIN TYPE
TYPE: CHRONIC PAIN
TYPE: CHRONIC PAIN

## 2020-05-11 ASSESSMENT — PAIN DESCRIPTION - DESCRIPTORS
DESCRIPTORS: ACHING
DESCRIPTORS: ACHING

## 2020-05-11 NOTE — PROGRESS NOTES
[facility]PITAL[facility] PROGRESS NOTE  Date: 5/11/2020   Name: Maty Che   MRN: [id number]   YOB: 1945  Chief Complaint   Patient presents with    Abdominal Pain     THIS AM, W/ INCREASED SOB        Subjective/Interval Hx:     She has been feeling short of breath. She states she has noticed bright red blood occasionally in the toilet. She was started on anticoagulation. ROS: negative unless mentioned above  Objective:   Physical Exam:   /77   Pulse 84   Temp 98 °F (36.7 °C) (Oral)   Resp 18   Ht 5' 5\" (1.651 m)   Wt 182 lb 5.1 oz (82.7 kg)   SpO2 98%   BMI 30.34 kg/m²   CONSTITUTIONAL:  No acute distress  HENT:  NCAT  LUNGS:  cta b/l bs+  CARDIOVASCULAR:  s1s2 rrr  ABDOMEN:  Soft nd, lower abdominal discomfort to palpation. MUSCULOSKELETAL/Extremities:  No edema, nontender  NEUROLOGIC:  No gross deficits, aao  SKIN:  No gross lesions    Assessment/Plan:     1. Anemia, acute on chronic, with acidosis  hgb 2.3 (baseline 7.5)  + occult in ED; on eliquis  CT a/p- no acute process  Received kcentra and vitamin K in ED  Serial H &H,   GI consult  -received 4 units PRBCs. Hemoglobin improved. GI recommends clear liquid diet. Patient states that she has been on Aleve. On PPI twice daily. 2. Hyperkalemia, 5.5  -Improved with Kayexalate. 3. Shortness of breath  Due to anemia. Transitioned from Bipap to [address] [place] [address] in ED  CXR without acute process  Denies hx of respiratory sx, fever/chills  ON 2LNC  -COVID 19 pending. 4. Elevated trop  Due to demand from anemia. Denies CP  Serial trop  -recheck troponin.   5. Hiatal hernia   -Seen on CT chest       Chronic  - Atrial fibrillation- Takes BB, dilt, eliquis- holding  - HLD- Cont statin  - Hypothyroidism- Cont synthroid  - DM- SSI; holding home meds  - Hx of breast ca- remote; cont anastrozole    DVT Prophylaxis: SCD due to anemia  GI Prophylaxis: Protonix  Diet: DIET CLEAR LIQUID;  Code Status: Full Code      Dispo:  -Need hemoglobin to be stable. Jb Cates MD  5/11/2020    Meds:   Meds:    pantoprazole  40 mg Intravenous Daily    sodium chloride flush  10 mL Intravenous 2 times per day    dilTIAZem  240 mg Oral Daily    levothyroxine  50 mcg Oral Daily    pravastatin  40 mg Oral Daily    metoprolol succinate  25 mg Oral BID    ferrous sulfate  325 mg Oral Daily with breakfast    anastrozole  1 mg Oral Daily    insulin lispro  0-6 Units Subcutaneous TID WC    insulin lispro  0-3 Units Subcutaneous Nightly      Infusions:    sodium chloride Stopped (05/10/20 1334)    dextrose       PRN Meds: sodium chloride flush, 10 mL, PRN  acetaminophen, 650 mg, Q6H PRN    Or  acetaminophen, 650 mg, Q6H PRN  polyethylene glycol, 17 g, Daily PRN  promethazine, 12.5 mg, Q6H PRN    Or  ondansetron, 4 mg, Q6H PRN  glucose, 15 g, PRN  dextrose, 12.5 g, PRN  glucagon (rDNA), 1 mg, PRN  dextrose, 100 mL/hr, PRN  oxyCODONE, 5 mg, Q4H PRN        Data/Labs:     Recent Labs     05/10/20  1010 05/10/20  1325  05/10/20  2100 05/11/20  0325 05/11/20  0954   WBC 10.2 9.7  --   --  8.0  --    HGB 2.3* 5.3*   < > 4.7* 7.8* 8.1*   HCT 10.6* 17.9*   < > 16.3* 24.7* 25.8*   * 387  --   --  347  --     < > = values in this interval not displayed. Recent Labs     05/10/20  1546 05/10/20  2100 05/11/20  0325    139 138   K 4.9 4.6 4.8    111* 111*   CO2 18* 19* 18*   BUN 37* 33* 30*   CREATININE 1.1 1.0 1.0     Recent Labs     05/10/20  1010 05/11/20  0325   AST 25 24   ALT 15 21   BILITOT 0.3 2.2*   ALKPHOS 56 49     Recent Labs     05/10/20  1010 05/11/20  0325   INR 2.49 1.32     Recent Labs     05/10/20  1010   CKTOTAL 172*   TROPONINT 0.075*     HgBA1c:   Lab Results   Component Value Date    LABA1C 6.1 09/29/2019     CALCIUM:  7.9/18 (05/11 0325)    I/O last 3 completed shifts:   In: 5309 [I.V.:30; Blood:3800]  Out: 1625 [Urine:1625]    Intake/Output Summary (Last 24 hours) at 5/11/2020 1045  Last data filed at 5/11/2020 1017  Gross per 24 hour   Intake 4080 ml   Output 1625 ml   Net 2455 ml

## 2020-05-11 NOTE — CARE COORDINATION
CM attempt to reach patient on room phone and no answer. Per medical record review patient is from home with her brother and sister in law and is independent. Patient has PCP and insurance with RX coverage. Patient has hx of SNF for rehab at [facility]  fall of 2019. Case Management to follow for any potential discharge needs.

## 2020-05-12 LAB
ANION GAP SERPL CALCULATED.3IONS-SCNC: 8 MMOL/L (ref 4–16)
BUN BLDV-MCNC: 21 MG/DL (ref 6–23)
CALCIUM SERPL-MCNC: 7.8 MG/DL (ref 8.3–10.6)
CHLORIDE BLD-SCNC: 110 MMOL/L (ref 99–110)
CO2: 19 MMOL/L (ref 21–32)
CREAT SERPL-MCNC: 1 MG/DL (ref 0.6–1.1)
GFR AFRICAN AMERICAN: >60 ML/MIN/1.73M2
GFR NON-AFRICAN AMERICAN: 54 ML/MIN/1.73M2
GLUCOSE BLD-MCNC: 100 MG/DL (ref 70–99)
GLUCOSE BLD-MCNC: 112 MG/DL (ref 70–99)
GLUCOSE BLD-MCNC: 127 MG/DL (ref 70–99)
GLUCOSE BLD-MCNC: 139 MG/DL (ref 70–99)
GLUCOSE BLD-MCNC: 156 MG/DL (ref 70–99)
HCT VFR BLD CALC: 23.9 % (ref 37–47)
HCT VFR BLD CALC: 26.5 % (ref 37–47)
HCT VFR BLD CALC: 27.2 % (ref 37–47)
HEMOGLOBIN: 7.5 GM/DL (ref 12.5–16)
HEMOGLOBIN: 8.1 GM/DL (ref 12.5–16)
HEMOGLOBIN: 8.2 GM/DL (ref 12.5–16)
POTASSIUM SERPL-SCNC: 4.5 MMOL/L (ref 3.5–5.1)
SODIUM BLD-SCNC: 137 MMOL/L (ref 135–145)

## 2020-05-12 PROCEDURE — 82962 GLUCOSE BLOOD TEST: CPT

## 2020-05-12 PROCEDURE — 36592 COLLECT BLOOD FROM PICC: CPT

## 2020-05-12 PROCEDURE — 6370000000 HC RX 637 (ALT 250 FOR IP): Performed by: NURSE PRACTITIONER

## 2020-05-12 PROCEDURE — 80048 BASIC METABOLIC PNL TOTAL CA: CPT

## 2020-05-12 PROCEDURE — 85014 HEMATOCRIT: CPT

## 2020-05-12 PROCEDURE — 1200000000 HC SEMI PRIVATE

## 2020-05-12 PROCEDURE — 94761 N-INVAS EAR/PLS OXIMETRY MLT: CPT

## 2020-05-12 PROCEDURE — 2580000003 HC RX 258: Performed by: NURSE PRACTITIONER

## 2020-05-12 PROCEDURE — 85018 HEMOGLOBIN: CPT

## 2020-05-12 PROCEDURE — 6370000000 HC RX 637 (ALT 250 FOR IP): Performed by: STUDENT IN AN ORGANIZED HEALTH CARE EDUCATION/TRAINING PROGRAM

## 2020-05-12 PROCEDURE — 6360000002 HC RX W HCPCS: Performed by: HOSPITALIST

## 2020-05-12 PROCEDURE — C9113 INJ PANTOPRAZOLE SODIUM, VIA: HCPCS | Performed by: HOSPITALIST

## 2020-05-12 PROCEDURE — 2000000000 HC ICU R&B

## 2020-05-12 RX ORDER — 0.9 % SODIUM CHLORIDE 0.9 %
1000 INTRAVENOUS SOLUTION INTRAVENOUS ONCE
Status: COMPLETED | OUTPATIENT
Start: 2020-05-12 | End: 2020-05-12

## 2020-05-12 RX ADMIN — LEVOTHYROXINE SODIUM 50 MCG: 50 TABLET ORAL at 08:24

## 2020-05-12 RX ADMIN — OXYCODONE HYDROCHLORIDE 5 MG: 5 TABLET ORAL at 22:01

## 2020-05-12 RX ADMIN — PANTOPRAZOLE SODIUM 40 MG: 40 INJECTION, POWDER, FOR SOLUTION INTRAVENOUS at 08:31

## 2020-05-12 RX ADMIN — SODIUM CHLORIDE, PRESERVATIVE FREE 10 ML: 5 INJECTION INTRAVENOUS at 08:25

## 2020-05-12 RX ADMIN — PANTOPRAZOLE SODIUM 40 MG: 40 INJECTION, POWDER, FOR SOLUTION INTRAVENOUS at 17:16

## 2020-05-12 RX ADMIN — SODIUM CHLORIDE 1000 ML: 9 INJECTION, SOLUTION INTRAVENOUS at 00:41

## 2020-05-12 RX ADMIN — DILTIAZEM HYDROCHLORIDE 240 MG: 240 CAPSULE, COATED, EXTENDED RELEASE ORAL at 08:24

## 2020-05-12 RX ADMIN — POLYETHYLENE GLYCOL 3350 17 G: 17 POWDER, FOR SOLUTION ORAL at 08:31

## 2020-05-12 RX ADMIN — METOPROLOL SUCCINATE 25 MG: 25 TABLET, EXTENDED RELEASE ORAL at 08:24

## 2020-05-12 RX ADMIN — SODIUM CHLORIDE, PRESERVATIVE FREE 10 ML: 5 INJECTION INTRAVENOUS at 20:00

## 2020-05-12 RX ADMIN — PRAVASTATIN SODIUM 40 MG: 40 TABLET ORAL at 08:24

## 2020-05-12 RX ADMIN — ANASTROZOLE 1 MG: 1 TABLET, COATED ORAL at 12:12

## 2020-05-12 ASSESSMENT — PAIN SCALES - GENERAL
PAINLEVEL_OUTOF10: 0
PAINLEVEL_OUTOF10: 10
PAINLEVEL_OUTOF10: 0

## 2020-05-12 ASSESSMENT — PAIN DESCRIPTION - LOCATION: LOCATION: LEG

## 2020-05-12 ASSESSMENT — PAIN DESCRIPTION - PAIN TYPE: TYPE: CHRONIC PAIN;ACUTE PAIN

## 2020-05-12 ASSESSMENT — PAIN DESCRIPTION - ONSET: ONSET: SUDDEN

## 2020-05-12 ASSESSMENT — PAIN DESCRIPTION - ORIENTATION: ORIENTATION: RIGHT;LEFT

## 2020-05-12 ASSESSMENT — PAIN DESCRIPTION - PROGRESSION: CLINICAL_PROGRESSION: NOT CHANGED

## 2020-05-12 ASSESSMENT — PAIN DESCRIPTION - DESCRIPTORS: DESCRIPTORS: ACHING

## 2020-05-12 ASSESSMENT — PAIN - FUNCTIONAL ASSESSMENT: PAIN_FUNCTIONAL_ASSESSMENT: ACTIVITIES ARE NOT PREVENTED

## 2020-05-12 ASSESSMENT — PAIN DESCRIPTION - FREQUENCY: FREQUENCY: INTERMITTENT

## 2020-05-12 NOTE — PLAN OF CARE
Problem: Pain:  Goal: Pain level will decrease  Description: Pain level will decrease  5/12/2020 0956 by Maggie Brenner RN  Outcome: Ongoing  5/11/2020 2046 by Mariana Vazquez RN  Outcome: Ongoing  Goal: Control of acute pain  Description: Control of acute pain  5/12/2020 0956 by Maggie Brenner RN  Outcome: Ongoing  5/11/2020 2046 by Mariana Vazquez RN  Outcome: Ongoing  Goal: Control of chronic pain  Description: Control of chronic pain  5/12/2020 0956 by Maggie Brenner RN  Outcome: Ongoing  5/11/2020 2046 by Mariana Vazquez RN  Outcome: Ongoing     Problem: Falls - Risk of:  Goal: Will remain free from falls  Description: Will remain free from falls  5/12/2020 0956 by Maggie Brenner RN  Outcome: Ongoing  5/11/2020 2046 by Mariana Vazquez RN  Outcome: Ongoing  Goal: Absence of physical injury  Description: Absence of physical injury  5/12/2020 0956 by Maggie Brenner RN  Outcome: Ongoing  5/11/2020 2046 by Mariana Vazquez RN  Outcome: Ongoing     Problem: Discharge Planning:  Goal: Discharged to appropriate level of care  Description: Discharged to appropriate level of care  5/12/2020 0956 by Maggie Brenner RN  Outcome: Ongoing  5/11/2020 2046 by Mariana Vazquez RN  Outcome: Ongoing     Problem: Discharge Planning:  Goal: Discharged to appropriate level of care  Description: Discharged to appropriate level of care  5/12/2020 0956 by Maggie Brenner RN  Outcome: Ongoing  5/11/2020 2046 by Mariana Vazquez RN  Outcome: Ongoing

## 2020-05-12 NOTE — PLAN OF CARE
Problem: Pain:  Goal: Pain level will decrease  Description: Pain level will decrease  5/11/2020 2046 by Lion Ortega RN  Outcome: Ongoing  5/11/2020 1023 by Lazaro Gates RN  Outcome: Ongoing  Goal: Control of acute pain  Description: Control of acute pain  Outcome: Ongoing  Goal: Control of chronic pain  Description: Control of chronic pain  Outcome: Ongoing     Problem: Falls - Risk of:  Goal: Will remain free from falls  Description: Will remain free from falls  5/11/2020 2046 by Lion Ortega RN  Outcome: Ongoing  5/11/2020 1023 by Lazaro Gates RN  Outcome: Ongoing  Goal: Absence of physical injury  Description: Absence of physical injury  Outcome: Ongoing     Problem: Discharge Planning:  Goal: Discharged to appropriate level of care  Description: Discharged to appropriate level of care  Outcome: Ongoing

## 2020-05-12 NOTE — PROGRESS NOTES
Department of Internal Medicine  General Internal Medicine  Attending Progress Note      SUBJECTIVE:  She does not feel short of breath. She still does not have her COVID test back. Likely from the anemia. She has been taking Aleve 1-2 times a day for about year.     OBJECTIVE      Medications    Current Facility-Administered Medications: pantoprazole (PROTONIX) injection 40 mg, 40 mg, Intravenous, BID  0.9 % sodium chloride infusion, 50 mL, Intravenous, Once  sodium chloride flush 0.9 % injection 10 mL, 10 mL, Intravenous, 2 times per day  sodium chloride flush 0.9 % injection 10 mL, 10 mL, Intravenous, PRN  acetaminophen (TYLENOL) tablet 650 mg, 650 mg, Oral, Q6H PRN **OR** acetaminophen (TYLENOL) suppository 650 mg, 650 mg, Rectal, Q6H PRN  polyethylene glycol (GLYCOLAX) packet 17 g, 17 g, Oral, Daily PRN  promethazine (PHENERGAN) tablet 12.5 mg, 12.5 mg, Oral, Q6H PRN **OR** ondansetron (ZOFRAN) injection 4 mg, 4 mg, Intravenous, Q6H PRN  dilTIAZem (CARDIZEM CD) extended release capsule 240 mg, 240 mg, Oral, Daily  levothyroxine (SYNTHROID) tablet 50 mcg, 50 mcg, Oral, Daily  pravastatin (PRAVACHOL) tablet 40 mg, 40 mg, Oral, Daily  metoprolol succinate (TOPROL XL) extended release tablet 25 mg, 25 mg, Oral, BID  ferrous sulfate (IRON 325) tablet 325 mg, 325 mg, Oral, Daily with breakfast  anastrozole (ARIMIDEX) tablet 1 mg, 1 mg, Oral, Daily  insulin lispro (HUMALOG) injection vial 0-6 Units, 0-6 Units, Subcutaneous, TID WC  insulin lispro (HUMALOG) injection vial 0-3 Units, 0-3 Units, Subcutaneous, Nightly  glucose (GLUTOSE) 40 % oral gel 15 g, 15 g, Oral, PRN  dextrose 50 % IV solution, 12.5 g, Intravenous, PRN  glucagon (rDNA) injection 1 mg, 1 mg, Intramuscular, PRN  dextrose 5 % solution, 100 mL/hr, Intravenous, PRN  oxyCODONE (ROXICODONE) immediate release tablet 5 mg, 5 mg, Oral, Q4H PRN  Physical    VITALS:  /79   Pulse 70   Temp 98.1 °F (36.7 °C) (Oral)   Resp 14   Ht 5' 5\" (1.651 m)   Wt 182 lb 1.6 oz (82.6 kg)   SpO2 99%   BMI 30.30 kg/m²   Gen-  A& O x 3  HEENT  - PERRLA, EOMI  CV - RRR no M/g/R, normal s1, s2  Lungs - CTA bilaterally no W/C/R  Abd - soft, NT, ND  Ext - no cyanosis or edema    Data    CBC:   Lab Results   Component Value Date    WBC 8.0 05/11/2020    RBC 2.85 05/11/2020    HGB 7.5 05/12/2020    HCT 23.9 05/12/2020    MCV 86.7 05/11/2020    MCH 27.4 05/11/2020    MCHC 31.6 05/11/2020    RDW 15.7 05/11/2020     05/11/2020    MPV 9.6 05/11/2020       ASSESSMENT AND PLAN        1. Anemia, acute on chronic, with acidosis  hgb 2.3 (baseline 7.5)  + occult in ED; on eliquis  CT a/p- no acute process  Received kcentra and vitamin K in ED  Serial H &H,   GI consult  -received 4 units PRBCs. Hemoglobin improved. GI recommends clear liquid diet. Patient states that she has been on Aleve. On PPI twice daily. 2. Hyperkalemia, 5.5  -Improved with Kayexalate. 3. Shortness of breath - seems to have resolved. Will transfer to floor if she is negative for [place] 19 pending. 4. Elevated trop  Due to demand from anemia. Denies CP  Serial trop  -recheck troponin.       5. Hiatal hernia   -Seen on CT chest      Gilford Seltzer MD, MSc

## 2020-05-13 LAB
ALBUMIN SERPL-MCNC: 2.7 GM/DL (ref 3.4–5)
ALP BLD-CCNC: 51 IU/L (ref 40–128)
ALT SERPL-CCNC: 15 U/L (ref 10–40)
ANION GAP SERPL CALCULATED.3IONS-SCNC: 8 MMOL/L (ref 4–16)
AST SERPL-CCNC: 11 IU/L (ref 15–37)
BILIRUB SERPL-MCNC: 0.8 MG/DL (ref 0–1)
BUN BLDV-MCNC: 15 MG/DL (ref 6–23)
CALCIUM SERPL-MCNC: 7.7 MG/DL (ref 8.3–10.6)
CHLORIDE BLD-SCNC: 108 MMOL/L (ref 99–110)
CO2: 19 MMOL/L (ref 21–32)
CREAT SERPL-MCNC: 1 MG/DL (ref 0.6–1.1)
GFR AFRICAN AMERICAN: >60 ML/MIN/1.73M2
GFR NON-AFRICAN AMERICAN: 54 ML/MIN/1.73M2
GLUCOSE BLD-MCNC: 112 MG/DL (ref 70–99)
GLUCOSE BLD-MCNC: 114 MG/DL (ref 70–99)
GLUCOSE BLD-MCNC: 119 MG/DL (ref 70–99)
GLUCOSE BLD-MCNC: 128 MG/DL (ref 70–99)
GLUCOSE BLD-MCNC: 132 MG/DL (ref 70–99)
HCT VFR BLD CALC: 24.4 % (ref 37–47)
HCT VFR BLD CALC: 26 % (ref 37–47)
HCT VFR BLD CALC: 27.1 % (ref 37–47)
HEMOGLOBIN: 7.3 GM/DL (ref 12.5–16)
HEMOGLOBIN: 7.8 GM/DL (ref 12.5–16)
HEMOGLOBIN: 8.3 GM/DL (ref 12.5–16)
POTASSIUM SERPL-SCNC: 4.8 MMOL/L (ref 3.5–5.1)
SARS-COV-2: NORMAL
SODIUM BLD-SCNC: 135 MMOL/L (ref 135–145)
TOTAL PROTEIN: 4.6 GM/DL (ref 6.4–8.2)

## 2020-05-13 PROCEDURE — 6360000002 HC RX W HCPCS: Performed by: HOSPITALIST

## 2020-05-13 PROCEDURE — 6370000000 HC RX 637 (ALT 250 FOR IP): Performed by: NURSE PRACTITIONER

## 2020-05-13 PROCEDURE — 2580000003 HC RX 258: Performed by: NURSE PRACTITIONER

## 2020-05-13 PROCEDURE — 85018 HEMOGLOBIN: CPT

## 2020-05-13 PROCEDURE — 2580000003 HC RX 258: Performed by: PHYSICIAN ASSISTANT

## 2020-05-13 PROCEDURE — 1200000000 HC SEMI PRIVATE

## 2020-05-13 PROCEDURE — 6370000000 HC RX 637 (ALT 250 FOR IP): Performed by: STUDENT IN AN ORGANIZED HEALTH CARE EDUCATION/TRAINING PROGRAM

## 2020-05-13 PROCEDURE — 36592 COLLECT BLOOD FROM PICC: CPT

## 2020-05-13 PROCEDURE — 80053 COMPREHEN METABOLIC PANEL: CPT

## 2020-05-13 PROCEDURE — 80048 BASIC METABOLIC PNL TOTAL CA: CPT

## 2020-05-13 PROCEDURE — 85014 HEMATOCRIT: CPT

## 2020-05-13 PROCEDURE — 94761 N-INVAS EAR/PLS OXIMETRY MLT: CPT

## 2020-05-13 PROCEDURE — C9113 INJ PANTOPRAZOLE SODIUM, VIA: HCPCS | Performed by: HOSPITALIST

## 2020-05-13 RX ORDER — 0.9 % SODIUM CHLORIDE 0.9 %
250 INTRAVENOUS SOLUTION INTRAVENOUS ONCE
Status: COMPLETED | OUTPATIENT
Start: 2020-05-13 | End: 2020-05-13

## 2020-05-13 RX ADMIN — PRAVASTATIN SODIUM 40 MG: 40 TABLET ORAL at 08:45

## 2020-05-13 RX ADMIN — PANTOPRAZOLE SODIUM 40 MG: 40 INJECTION, POWDER, FOR SOLUTION INTRAVENOUS at 05:01

## 2020-05-13 RX ADMIN — LEVOTHYROXINE SODIUM 50 MCG: 50 TABLET ORAL at 05:01

## 2020-05-13 RX ADMIN — SODIUM CHLORIDE, PRESERVATIVE FREE 10 ML: 5 INJECTION INTRAVENOUS at 08:46

## 2020-05-13 RX ADMIN — DILTIAZEM HYDROCHLORIDE 240 MG: 240 CAPSULE, COATED, EXTENDED RELEASE ORAL at 08:45

## 2020-05-13 RX ADMIN — PANTOPRAZOLE SODIUM 40 MG: 40 INJECTION, POWDER, FOR SOLUTION INTRAVENOUS at 18:00

## 2020-05-13 RX ADMIN — SODIUM CHLORIDE, PRESERVATIVE FREE 10 ML: 5 INJECTION INTRAVENOUS at 21:42

## 2020-05-13 RX ADMIN — POLYETHYLENE GLYCOL 3350 17 G: 17 POWDER, FOR SOLUTION ORAL at 08:45

## 2020-05-13 RX ADMIN — METOPROLOL SUCCINATE 25 MG: 25 TABLET, EXTENDED RELEASE ORAL at 08:45

## 2020-05-13 RX ADMIN — SODIUM CHLORIDE 250 ML: 9 INJECTION, SOLUTION INTRAVENOUS at 03:15

## 2020-05-13 RX ADMIN — ANASTROZOLE 1 MG: 1 TABLET, COATED ORAL at 08:45

## 2020-05-13 RX ADMIN — OXYCODONE HYDROCHLORIDE 5 MG: 5 TABLET ORAL at 20:48

## 2020-05-13 ASSESSMENT — PAIN SCALES - GENERAL
PAINLEVEL_OUTOF10: 0
PAINLEVEL_OUTOF10: 10

## 2020-05-13 NOTE — PROGRESS NOTES
Department of Internal Medicine  General Internal Medicine  Attending Progress Note      SUBJECTIVE:  She does not feel short of breath or have chest pain. I called the lab about her COVID test.  It was not picked up until Monday afternoon. It should be back today.       OBJECTIVE      Medications    Current Facility-Administered Medications: pantoprazole (PROTONIX) injection 40 mg, 40 mg, Intravenous, BID  0.9 % sodium chloride infusion, 50 mL, Intravenous, Once  sodium chloride flush 0.9 % injection 10 mL, 10 mL, Intravenous, 2 times per day  sodium chloride flush 0.9 % injection 10 mL, 10 mL, Intravenous, PRN  acetaminophen (TYLENOL) tablet 650 mg, 650 mg, Oral, Q6H PRN **OR** acetaminophen (TYLENOL) suppository 650 mg, 650 mg, Rectal, Q6H PRN  polyethylene glycol (GLYCOLAX) packet 17 g, 17 g, Oral, Daily PRN  promethazine (PHENERGAN) tablet 12.5 mg, 12.5 mg, Oral, Q6H PRN **OR** ondansetron (ZOFRAN) injection 4 mg, 4 mg, Intravenous, Q6H PRN  dilTIAZem (CARDIZEM CD) extended release capsule 240 mg, 240 mg, Oral, Daily  levothyroxine (SYNTHROID) tablet 50 mcg, 50 mcg, Oral, Daily  pravastatin (PRAVACHOL) tablet 40 mg, 40 mg, Oral, Daily  metoprolol succinate (TOPROL XL) extended release tablet 25 mg, 25 mg, Oral, BID  ferrous sulfate (IRON 325) tablet 325 mg, 325 mg, Oral, Daily with breakfast  anastrozole (ARIMIDEX) tablet 1 mg, 1 mg, Oral, Daily  insulin lispro (HUMALOG) injection vial 0-6 Units, 0-6 Units, Subcutaneous, TID WC  insulin lispro (HUMALOG) injection vial 0-3 Units, 0-3 Units, Subcutaneous, Nightly  glucose (GLUTOSE) 40 % oral gel 15 g, 15 g, Oral, PRN  dextrose 50 % IV solution, 12.5 g, Intravenous, PRN  glucagon (rDNA) injection 1 mg, 1 mg, Intramuscular, PRN  dextrose 5 % solution, 100 mL/hr, Intravenous, PRN  oxyCODONE (ROXICODONE) immediate release tablet 5 mg, 5 mg, Oral, Q4H PRN  Physical    VITALS:  BP (!) 108/57   Pulse 68   Temp 98 °F (36.7 °C) (Oral)   Resp 14   Ht 5' 5\" (1.651 m) Wt 180 lb (81.6 kg)   SpO2 96%   BMI 29.95 kg/m²   Gen-  A& O x 3  HEENT  - PERRLA, EOMI  CV - RRR no M/g/R, normal s1, s2  Lungs - CTA bilaterally no W/C/R  Abd - soft, NT, ND  Ext - no cyanosis or edema    Data    CBC:   Lab Results   Component Value Date    WBC 8.0 05/11/2020    RBC 2.85 05/11/2020    HGB 7.3 05/13/2020    HCT 24.4 05/13/2020    MCV 86.7 05/11/2020    MCH 27.4 05/11/2020    MCHC 31.6 05/11/2020    RDW 15.7 05/11/2020     05/11/2020    MPV 9.6 05/11/2020       ASSESSMENT AND PLAN        1. Anemia, acute on chronic, with acidosis  hgb 2.3 (baseline 7.5)  + occult in ED; on eliquis  CT a/p- no acute process  Received kcentra and vitamin K in ED  Serial H &H,   GI consult  -received 4 units PRBCs. Hemoglobin improved. GI recommends clear liquid diet. Patient states that she has been on Aleve. On PPI twice daily. 2. Hyperkalemia, 5.5  -Improved with Kayexalate. 3. Shortness of breath - seems to have resolved. Will transfer to floor if she is negative for COVID19  - COVID 19 pending, should be back today      4. Elevated trop  Due to demand from anemia. Denies CP  Serial trop  -recheck troponin.       5. Hiatal hernia   -Seen on CT chest      Roland Ramirez MD, MSc

## 2020-05-13 NOTE — PROGRESS NOTES
Skin assessment done on transfer with [facility]. Pt has scabs/ scrapes to bilateral hips, generalized bruising, wound to right buttock (See LDA), no open areas noted.

## 2020-05-13 NOTE — FLOWSHEET NOTE
Redraw lavender tube per lab they cannot find the tube from 0115.  BP low see chart-Rhina Zuleta on unit and saw pt ordered a bolus given as per Mar.

## 2020-05-13 NOTE — PLAN OF CARE
Problem: Pain:  Goal: Pain level will decrease  Description: Pain level will decrease  5/13/2020 0909 by Maggie Brenner RN  Outcome: Ongoing  5/12/2020 2308 by Ricki Diamond RN  Outcome: Ongoing  Goal: Control of acute pain  Description: Control of acute pain  5/13/2020 0909 by Maggie Brenner RN  Outcome: Ongoing  5/12/2020 2308 by Ricki Diamond RN  Outcome: Ongoing  Goal: Control of chronic pain  Description: Control of chronic pain  5/13/2020 0909 by Maggie Brenner RN  Outcome: Ongoing  5/12/2020 2308 by Ricki Diamond RN  Outcome: Ongoing     Problem: Falls - Risk of:  Goal: Will remain free from falls  Description: Will remain free from falls  5/13/2020 0909 by Maggie Brenner RN  Outcome: Ongoing  5/12/2020 2308 by Ricki Diamond RN  Outcome: Ongoing  Goal: Absence of physical injury  Description: Absence of physical injury  5/13/2020 0909 by Maggie Brenner RN  Outcome: Ongoing  5/12/2020 2308 by Ricki Diamond RN  Outcome: Ongoing     Problem: Discharge Planning:  Goal: Discharged to appropriate level of care  Description: Discharged to appropriate level of care  5/13/2020 0909 by Maggie Brenner RN  Outcome: Ongoing  5/12/2020 2308 by Ricki Diamond RN  Outcome: Ongoing     Problem: Discharge Planning:  Goal: Discharged to appropriate level of care  Description: Discharged to appropriate level of care  5/13/2020 0909 by Maggie Brenner RN  Outcome: Ongoing  5/12/2020 2308 by Ricki Diamond RN  Outcome: Ongoing

## 2020-05-14 ENCOUNTER — ANESTHESIA EVENT (OUTPATIENT)
Dept: ENDOSCOPY | Age: 75
DRG: 378 | End: 2020-05-14
Payer: MEDICARE

## 2020-05-14 ENCOUNTER — ANESTHESIA (OUTPATIENT)
Dept: ENDOSCOPY | Age: 75
DRG: 378 | End: 2020-05-14
Payer: MEDICARE

## 2020-05-14 VITALS — SYSTOLIC BLOOD PRESSURE: 104 MMHG | OXYGEN SATURATION: 99 % | DIASTOLIC BLOOD PRESSURE: 58 MMHG

## 2020-05-14 LAB
ALBUMIN SERPL-MCNC: 2.6 GM/DL (ref 3.4–5)
ALP BLD-CCNC: 52 IU/L (ref 40–128)
ALT SERPL-CCNC: 12 U/L (ref 10–40)
ANION GAP SERPL CALCULATED.3IONS-SCNC: 8 MMOL/L (ref 4–16)
AST SERPL-CCNC: 9 IU/L (ref 15–37)
BILIRUB SERPL-MCNC: 0.8 MG/DL (ref 0–1)
BUN BLDV-MCNC: 12 MG/DL (ref 6–23)
CALCIUM SERPL-MCNC: 7.7 MG/DL (ref 8.3–10.6)
CHLORIDE BLD-SCNC: 108 MMOL/L (ref 99–110)
CO2: 19 MMOL/L (ref 21–32)
CREAT SERPL-MCNC: 1 MG/DL (ref 0.6–1.1)
GFR AFRICAN AMERICAN: >60 ML/MIN/1.73M2
GFR NON-AFRICAN AMERICAN: 54 ML/MIN/1.73M2
GLUCOSE BLD-MCNC: 106 MG/DL (ref 70–99)
GLUCOSE BLD-MCNC: 127 MG/DL (ref 70–99)
GLUCOSE BLD-MCNC: 128 MG/DL (ref 70–99)
GLUCOSE BLD-MCNC: 129 MG/DL (ref 70–99)
GLUCOSE BLD-MCNC: 92 MG/DL (ref 70–99)
HCT VFR BLD CALC: 25.8 % (ref 37–47)
HEMOGLOBIN: 7.5 GM/DL (ref 12.5–16)
MCH RBC QN AUTO: 26.9 PG (ref 27–31)
MCHC RBC AUTO-ENTMCNC: 29.1 % (ref 32–36)
MCV RBC AUTO: 92.5 FL (ref 78–100)
PDW BLD-RTO: 17.4 % (ref 11.7–14.9)
PLATELET # BLD: 309 K/CU MM (ref 140–440)
PMV BLD AUTO: 9.4 FL (ref 7.5–11.1)
POTASSIUM SERPL-SCNC: 4.8 MMOL/L (ref 3.5–5.1)
RBC # BLD: 2.79 M/CU MM (ref 4.2–5.4)
SODIUM BLD-SCNC: 135 MMOL/L (ref 135–145)
TOTAL PROTEIN: 4.5 GM/DL (ref 6.4–8.2)
WBC # BLD: 6.2 K/CU MM (ref 4–10.5)

## 2020-05-14 PROCEDURE — 82962 GLUCOSE BLOOD TEST: CPT

## 2020-05-14 PROCEDURE — 88342 IMHCHEM/IMCYTCHM 1ST ANTB: CPT

## 2020-05-14 PROCEDURE — 0DB58ZX EXCISION OF ESOPHAGUS, VIA NATURAL OR ARTIFICIAL OPENING ENDOSCOPIC, DIAGNOSTIC: ICD-10-PCS | Performed by: SPECIALIST

## 2020-05-14 PROCEDURE — C9113 INJ PANTOPRAZOLE SODIUM, VIA: HCPCS | Performed by: HOSPITALIST

## 2020-05-14 PROCEDURE — 0DB98ZX EXCISION OF DUODENUM, VIA NATURAL OR ARTIFICIAL OPENING ENDOSCOPIC, DIAGNOSTIC: ICD-10-PCS | Performed by: SPECIALIST

## 2020-05-14 PROCEDURE — 2580000003 HC RX 258: Performed by: NURSE PRACTITIONER

## 2020-05-14 PROCEDURE — 88112 CYTOPATH CELL ENHANCE TECH: CPT

## 2020-05-14 PROCEDURE — 85027 COMPLETE CBC AUTOMATED: CPT

## 2020-05-14 PROCEDURE — 94761 N-INVAS EAR/PLS OXIMETRY MLT: CPT

## 2020-05-14 PROCEDURE — 1200000000 HC SEMI PRIVATE

## 2020-05-14 PROCEDURE — 6370000000 HC RX 637 (ALT 250 FOR IP): Performed by: STUDENT IN AN ORGANIZED HEALTH CARE EDUCATION/TRAINING PROGRAM

## 2020-05-14 PROCEDURE — 6360000002 HC RX W HCPCS: Performed by: HOSPITALIST

## 2020-05-14 PROCEDURE — 2500000003 HC RX 250 WO HCPCS: Performed by: NURSE ANESTHETIST, CERTIFIED REGISTERED

## 2020-05-14 PROCEDURE — 2709999900 HC NON-CHARGEABLE SUPPLY: Performed by: SPECIALIST

## 2020-05-14 PROCEDURE — 88305 TISSUE EXAM BY PATHOLOGIST: CPT

## 2020-05-14 PROCEDURE — 3609012400 HC EGD TRANSORAL BIOPSY SINGLE/MULTIPLE: Performed by: SPECIALIST

## 2020-05-14 PROCEDURE — 6370000000 HC RX 637 (ALT 250 FOR IP): Performed by: NURSE PRACTITIONER

## 2020-05-14 PROCEDURE — 80053 COMPREHEN METABOLIC PANEL: CPT

## 2020-05-14 PROCEDURE — 6360000002 HC RX W HCPCS: Performed by: NURSE ANESTHETIST, CERTIFIED REGISTERED

## 2020-05-14 PROCEDURE — 0DB68ZX EXCISION OF STOMACH, VIA NATURAL OR ARTIFICIAL OPENING ENDOSCOPIC, DIAGNOSTIC: ICD-10-PCS | Performed by: SPECIALIST

## 2020-05-14 PROCEDURE — 3700000000 HC ANESTHESIA ATTENDED CARE: Performed by: SPECIALIST

## 2020-05-14 PROCEDURE — 2580000003 HC RX 258: Performed by: ANESTHESIOLOGY

## 2020-05-14 PROCEDURE — 3700000001 HC ADD 15 MINUTES (ANESTHESIA): Performed by: SPECIALIST

## 2020-05-14 RX ORDER — LIDOCAINE HYDROCHLORIDE 20 MG/ML
INJECTION, SOLUTION INFILTRATION; PERINEURAL PRN
Status: DISCONTINUED | OUTPATIENT
Start: 2020-05-14 | End: 2020-05-14 | Stop reason: SDUPTHER

## 2020-05-14 RX ORDER — PROPOFOL 10 MG/ML
INJECTION, EMULSION INTRAVENOUS PRN
Status: DISCONTINUED | OUTPATIENT
Start: 2020-05-14 | End: 2020-05-14 | Stop reason: SDUPTHER

## 2020-05-14 RX ORDER — SODIUM CHLORIDE, SODIUM LACTATE, POTASSIUM CHLORIDE, CALCIUM CHLORIDE 600; 310; 30; 20 MG/100ML; MG/100ML; MG/100ML; MG/100ML
INJECTION, SOLUTION INTRAVENOUS CONTINUOUS
Status: DISCONTINUED | OUTPATIENT
Start: 2020-05-14 | End: 2020-05-17 | Stop reason: HOSPADM

## 2020-05-14 RX ADMIN — METOPROLOL SUCCINATE 25 MG: 25 TABLET, EXTENDED RELEASE ORAL at 09:41

## 2020-05-14 RX ADMIN — LIDOCAINE HYDROCHLORIDE 100 MG: 20 INJECTION, SOLUTION INFILTRATION; PERINEURAL at 08:26

## 2020-05-14 RX ADMIN — SODIUM CHLORIDE, POTASSIUM CHLORIDE, SODIUM LACTATE AND CALCIUM CHLORIDE: 600; 310; 30; 20 INJECTION, SOLUTION INTRAVENOUS at 07:56

## 2020-05-14 RX ADMIN — DILTIAZEM HYDROCHLORIDE 240 MG: 240 CAPSULE, COATED, EXTENDED RELEASE ORAL at 09:41

## 2020-05-14 RX ADMIN — METOPROLOL SUCCINATE 25 MG: 25 TABLET, EXTENDED RELEASE ORAL at 23:14

## 2020-05-14 RX ADMIN — PANTOPRAZOLE SODIUM 40 MG: 40 INJECTION, POWDER, FOR SOLUTION INTRAVENOUS at 05:48

## 2020-05-14 RX ADMIN — OXYCODONE HYDROCHLORIDE 5 MG: 5 TABLET ORAL at 23:14

## 2020-05-14 RX ADMIN — SODIUM CHLORIDE, PRESERVATIVE FREE 10 ML: 5 INJECTION INTRAVENOUS at 23:15

## 2020-05-14 RX ADMIN — FERROUS SULFATE TAB 325 MG (65 MG ELEMENTAL FE) 325 MG: 325 (65 FE) TAB at 09:41

## 2020-05-14 RX ADMIN — PANTOPRAZOLE SODIUM 40 MG: 40 INJECTION, POWDER, FOR SOLUTION INTRAVENOUS at 18:24

## 2020-05-14 RX ADMIN — PHENYLEPHRINE HYDROCHLORIDE 100 MCG: 10 INJECTION INTRAVENOUS at 08:34

## 2020-05-14 RX ADMIN — ANASTROZOLE 1 MG: 1 TABLET, COATED ORAL at 09:41

## 2020-05-14 RX ADMIN — PHENYLEPHRINE HYDROCHLORIDE 200 MCG: 10 INJECTION INTRAVENOUS at 08:29

## 2020-05-14 RX ADMIN — PRAVASTATIN SODIUM 40 MG: 40 TABLET ORAL at 09:41

## 2020-05-14 RX ADMIN — PROPOFOL 60 MG: 10 INJECTION, EMULSION INTRAVENOUS at 08:26

## 2020-05-14 ASSESSMENT — PAIN SCALES - WONG BAKER
WONGBAKER_NUMERICALRESPONSE: 0

## 2020-05-14 ASSESSMENT — PAIN SCALES - GENERAL
PAINLEVEL_OUTOF10: 10
PAINLEVEL_OUTOF10: 0
PAINLEVEL_OUTOF10: 0

## 2020-05-14 NOTE — BRIEF OP NOTE
Brief Postoperative Note      Sloane Baugh is a 69 y.o. female     Pre-operative Diagnosis: ANEMIA  / GIT BLEEDING    Post-operative Diagnosis: LARGE H/H--1 CM BARRETTS---2 CM GASTRIC ULCER--BX AND BRUSHINGS OBTAINED--LINEAR  EROSIONS IN THE H/H--CAMERONS LESIONS    Procedure: EGD WITH BX AND BRUSHINGS    Anesthesia: MAC    Surgeons/Assistants:Marcos Hill     Estimated Blood Loss: Minimal    Complications: None    Specimens: were obtained    REC  CPM  -F/U BX--COLONOSCOPY AND SMALL BOWEL EVALUATION AS OUTPT  Marcos Hill   5/14/2020   8:44 AM

## 2020-05-14 NOTE — ANESTHESIA POSTPROCEDURE EVALUATION
Department of Anesthesiology  Postprocedure Note    Patient: Sury Gray  MRN: [id number]  YOB: 1945  Date of evaluation: 5/14/2020  Time:  8:43 AM     Procedure Summary     Date:  05/14/20 Room / Location:  [address]    Anesthesia Start:  1398 Anesthesia Stop:  1747    Procedure:  EGD ESOPHAGOGASTRODUODENOSCOPY (N/A ) Diagnosis:  (anemia)    Surgeon:  Av Dong MD Responsible Provider:  LORENZO Avelar CRNA    Anesthesia Type:  MAC, TIVA, general ASA Status:  4 - Emergent          Anesthesia Type: MAC, TIVA, general    Sky Phase I:      Sky Phase II:      Last vitals: Reviewed and per EMR flowsheets.        Anesthesia Post Evaluation    Patient location during evaluation: bedside  Patient participation: complete - patient participated  Level of consciousness: awake and alert  Pain score: 0  Airway patency: patent  Nausea & Vomiting: no nausea and no vomiting  Complications: no  Cardiovascular status: hemodynamically stable  Respiratory status: acceptable

## 2020-05-14 NOTE — H&P
I have examined the patient immediately before the procedure and there is no change in the previous history and physical

## 2020-05-14 NOTE — ANESTHESIA PRE PROCEDURE
Shotsberger, APRN - NP        glucagon (rDNA) injection 1 mg  1 mg Intramuscular PRN Estanislado Gilford, APRN - NP        dextrose 5 % solution  100 mL/hr Intravenous PRN Estanislado Gilford, APRN - NP        oxyCODONE (ROXICODONE) immediate release tablet 5 mg  5 mg Oral Q4H PRN Areachelia Gentle, DO   5 mg at 05/13/20 2048       Allergies: Allergies   Allergen Reactions    Seasonal Other (See Comments)     coughing       Problem List:    Patient Active Problem List   Diagnosis Code    HX: breast cancer Z85.3    Iron deficiency anemia D50.9    Essential hypertension, benign I10    Type 2 diabetes mellitus without complication ([facility] [address] L42.3    Pure hypercholesterolemia E78.00    History of left breast cancer Z85.3    Acute blood loss anemia D62    Atrial fibrillation with rapid ventricular response (HCC) I48.91    Acute kidney injury ([facility] [address] N17.9    Hyponatremia E87.1    Ileus (HCC) K56.7    Acute on chronic cholecystitis K81.2    Acute on chronic anemia D64.9       Past Medical History:        Diagnosis Date    Arthritis     Cancer [facility])        Past Surgical History:        Procedure Laterality Date    BREAST SURGERY      CHOLECYSTECTOMY, LAPAROSCOPIC N/A 9/28/2019    CHOLECYSTECTOMY LAPAROSCOPIC performed by Alejandro Ken MD at [address] [place] [address]         Social History:    Social History     Tobacco Use    Smoking status: Never Smoker    Smokeless tobacco: Never Used   Substance Use Topics    Alcohol use:  No                                Counseling given: Not Answered      Vital Signs (Current):   Vitals:    05/13/20 0801 05/13/20 1632 05/13/20 2034 05/14/20 0429   BP: (!) 108/57 119/63 102/60 (!) 109/56   Pulse: 68 57 69 65   Resp: 14 15 18 22   Temp:  97.7 °F (36.5 °C) 98.7 °F (37.1 °C) 97.7 °F (36.5 °C)   TempSrc:  Oral Oral Oral   SpO2: 96% 100% 97% 97%   Weight:    176 lb (79.8 kg)   Height:                                                  BP Readings

## 2020-05-14 NOTE — CARE COORDINATION
Reviewed chart and pt is from home with family. CM asked pt's RN General Kevin to ambulate pt in hallway to assist CM with assessment of discharge needs.   CM will visit pt this pm.

## 2020-05-14 NOTE — PROGRESS NOTES
Department of Internal Medicine  General Internal Medicine  Attending Progress Note      SUBJECTIVE: Patient denied any new complaints  No acute overnight events  N.p.o. for EGD    OBJECTIVE      Medications    Current Facility-Administered Medications: lactated ringers infusion, , Intravenous, Continuous  pantoprazole (PROTONIX) injection 40 mg, 40 mg, Intravenous, BID  0.9 % sodium chloride infusion, 50 mL, Intravenous, Once  sodium chloride flush 0.9 % injection 10 mL, 10 mL, Intravenous, 2 times per day  sodium chloride flush 0.9 % injection 10 mL, 10 mL, Intravenous, PRN  acetaminophen (TYLENOL) tablet 650 mg, 650 mg, Oral, Q6H PRN **OR** acetaminophen (TYLENOL) suppository 650 mg, 650 mg, Rectal, Q6H PRN  polyethylene glycol (GLYCOLAX) packet 17 g, 17 g, Oral, Daily PRN  promethazine (PHENERGAN) tablet 12.5 mg, 12.5 mg, Oral, Q6H PRN **OR** ondansetron (ZOFRAN) injection 4 mg, 4 mg, Intravenous, Q6H PRN  dilTIAZem (CARDIZEM CD) extended release capsule 240 mg, 240 mg, Oral, Daily  levothyroxine (SYNTHROID) tablet 50 mcg, 50 mcg, Oral, Daily  pravastatin (PRAVACHOL) tablet 40 mg, 40 mg, Oral, Daily  metoprolol succinate (TOPROL XL) extended release tablet 25 mg, 25 mg, Oral, BID  ferrous sulfate (IRON 325) tablet 325 mg, 325 mg, Oral, Daily with breakfast  anastrozole (ARIMIDEX) tablet 1 mg, 1 mg, Oral, Daily  insulin lispro (HUMALOG) injection vial 0-6 Units, 0-6 Units, Subcutaneous, TID WC  insulin lispro (HUMALOG) injection vial 0-3 Units, 0-3 Units, Subcutaneous, Nightly  glucose (GLUTOSE) 40 % oral gel 15 g, 15 g, Oral, PRN  dextrose 50 % IV solution, 12.5 g, Intravenous, PRN  glucagon (rDNA) injection 1 mg, 1 mg, Intramuscular, PRN  dextrose 5 % solution, 100 mL/hr, Intravenous, PRN  oxyCODONE (ROXICODONE) immediate release tablet 5 mg, 5 mg, Oral, Q4H PRN  Physical    VITALS:  /61   Pulse 72   Temp 98.1 °F (36.7 °C) (Oral)   Resp 18   Ht 5' 5\" (1.651 m)   Wt 176 lb (79.8 kg)   SpO2 96%   BMI 29.29 kg/m²   Gen-  A& O x 3  HEENT  - PERRLA, EOMI  CV - RRR no M/g/R, normal s1, s2  Lungs - CTA bilaterally no W/C/R  Abd - soft, NT, ND  Ext - no cyanosis or edema    Data    CBC:   Lab Results   Component Value Date    WBC 6.2 05/14/2020    RBC 2.79 05/14/2020    HGB 7.5 05/14/2020    HCT 25.8 05/14/2020    MCV 92.5 05/14/2020    MCH 26.9 05/14/2020    MCHC 29.1 05/14/2020    RDW 17.4 05/14/2020     05/14/2020    MPV 9.4 05/14/2020       ASSESSMENT AND PLAN        Anemia, acute on chronic, with acidosis   continue Protonix and EGD today  Received kcentra and vitamin K in ED   s/p 5 unit PRBC transfusion. Monitor H&H    1. Hyperkalemia, 5.5  -Improved with Kayexalate. 2. Shortness of breath - seems to have resolved. Will transfer to floor if she is negative for COVID19  - COVID 19 pending, should be back today      3. Elevated trop  Due to demand from anemia. Denies CP  Serial trop  -recheck troponin. 4. Hiatal hernia   -Seen on CT chest      Zara Carlson.  Sara Jung MD  Hospitalist, [facility]

## 2020-05-14 NOTE — PLAN OF CARE
Problem: Pain:  Description: Pain management should include both nonpharmacologic and pharmacologic interventions.   Goal: Pain level will decrease  Description: Pain level will decrease  5/14/2020 1024 by Austin Zamudio RN  Outcome: Ongoing  5/14/2020 0249 by Neal Key RN  Outcome: Ongoing  Goal: Control of acute pain  Description: Control of acute pain  5/14/2020 1024 by Austin Zamudio RN  Outcome: Ongoing  5/14/2020 0249 by Neal Key RN  Outcome: Ongoing  Goal: Control of chronic pain  Description: Control of chronic pain  5/14/2020 1024 by Austin Zamudio RN  Outcome: Ongoing  5/14/2020 0249 by Neal Key RN  Outcome: Ongoing     Problem: Falls - Risk of:  Goal: Will remain free from falls  Description: Will remain free from falls  5/14/2020 1024 by Austin Zamudio RN  Outcome: Ongoing  5/14/2020 0249 by Neal Key RN  Outcome: Ongoing  Goal: Absence of physical injury  Description: Absence of physical injury  5/14/2020 1024 by Austin Zamudio RN  Outcome: Ongoing  5/14/2020 0249 by Neal Key RN  Outcome: Ongoing     Problem: Discharge Planning:  Goal: Discharged to appropriate level of care  Description: Discharged to appropriate level of care  5/14/2020 1024 by Austin Zamudio RN  Outcome: Ongoing  5/14/2020 0249 by Neal Key RN  Outcome: Ongoing

## 2020-05-14 NOTE — PROGRESS NOTES
Attempted to get patient out of bed to ambulate. Patient refusing and states \"I am just too tired\". I inquired on going for a walk later this afternoon and patient states \"I am not making a promise I don't intend to keep\". Will continue to encourage ambulation.

## 2020-05-14 NOTE — PLAN OF CARE
Problem: Pain:  Goal: Pain level will decrease  Description: Pain level will decrease  Outcome: Ongoing  Goal: Control of acute pain  Description: Control of acute pain  Outcome: Ongoing  Goal: Control of chronic pain  Description: Control of chronic pain  Outcome: Ongoing     Problem: Falls - Risk of:  Goal: Will remain free from falls  Description: Will remain free from falls  Outcome: Ongoing  Goal: Absence of physical injury  Description: Absence of physical injury  Outcome: Ongoing     Problem: Discharge Planning:  Goal: Discharged to appropriate level of care  Description: Discharged to appropriate level of care  Outcome: Ongoing

## 2020-05-14 NOTE — OP NOTE
[address], [facility]                                OPERATIVE REPORT    PATIENT NAME: Valdez Peres                     :        1945  MED REC NO:   [id number]                          ROOM:       [id number]  ACCOUNT NO:   [de-identified]                           ADMIT DATE: 05/10/2020  PROVIDER:     Niles Leung MD    DATE OF PROCEDURE:  2020    PROCEDURE:  EGD with biopsy and brushings. PRIMARY CARE PROVIDER:  Sanchez Hamilton MD    CHIEF COMPLAINT:  History of severe anemia; rule out upper GI bleeding. PREMEDICATION:  Please refer to the anesthesiologist's notes. DESCRIPTION OF PROCEDURE:  The patient was placed in the left lateral  decubitus position and the video Olympus gastroscope was introduced in  the back of the throat and was advanced into the esophagus. ESOPHAGUS:  A large hiatal hernia was noted and also a short segment of  Lawler's esophagus was noted, but no erosion, ulcer, stricture or mass  lesions were seen. Biopsies were obtained from the Lawler's segment  and sent for histopathology. STOMACH:  The fundus, cardia, body, antrum, lesser curvature, greater  curvature and the pyloric regions of the stomach were examined. The  mucosa of the stomach was slightly hyperemic and a 2-cm smooth, rounded  benign-appearing ulcer was noted in the upper body of the stomach distal  to the hiatus of the diaphragm and multiple biopsies were obtained from  the ulcer and sent for histopathology and also brushings were collected  and sent for cytology as well. There was no stigmata of recent bleeding  from the ulcer and also linear erosions were noted in the hiatal hernial  sac representing Roni's lesion with some recent bleeding and no mass  lesions were seen. DUODENUM:  The first and second portions of the duodenum were examined  and the mucosa was unremarkable.   Multiple biopsies were obtained from  the duodenum and sent for histopathology to rule out celiac disease. POSTOPERATIVE DIAGNOSES:  1.  Large hiatal hernia. 2.  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  1-cm short segment of Lawler's esophagus noted; biopsies obtained. 4.  A 2-cm benign-appearing rounded ulcer noted in the upper body of the  stomach distal to the hiatus; biopsies and brushings obtained. RECOMMENDATIONS:  1. Antireflux measures. 2.  We will continue the patient on pantoprazole 40 mg b.i.d.  3.  If the above biopsies are negative for malignancy, we will likely do  a followup EGD in about 8 weeks' time to document healing of the ulcer. 4.  The patient will need a colonoscopy and small bowel evaluation also  as a part of workup of her severe anemia. 5.  Monitor the patient's H and H and transfuse on a p.r.n. basis to  keep hemoglobin above 7 gm percent. The patient tolerated the procedure well. There were no postop  complications.   6.The blood loss during the procedure was nil      Mortimer Harari, MD    D: 05/14/2020 8:51:59       T: 05/14/2020 9:50:18     [id number]  Job#: [id number]     Doc#: [id number]    CC:  Thelma Ochoa MD

## 2020-05-15 LAB
ALBUMIN SERPL-MCNC: 2.6 GM/DL (ref 3.4–5)
ALP BLD-CCNC: 54 IU/L (ref 40–129)
ALT SERPL-CCNC: 10 U/L (ref 10–40)
ANION GAP SERPL CALCULATED.3IONS-SCNC: 8 MMOL/L (ref 4–16)
AST SERPL-CCNC: 13 IU/L (ref 15–37)
BASOPHILS ABSOLUTE: 0.1 K/CU MM
BASOPHILS RELATIVE PERCENT: 1.1 % (ref 0–1)
BILIRUB SERPL-MCNC: 0.5 MG/DL (ref 0–1)
BUN BLDV-MCNC: 8 MG/DL (ref 6–23)
CALCIUM SERPL-MCNC: 7.7 MG/DL (ref 8.3–10.6)
CHLORIDE BLD-SCNC: 108 MMOL/L (ref 99–110)
CO2: 21 MMOL/L (ref 21–32)
CREAT SERPL-MCNC: 0.9 MG/DL (ref 0.6–1.1)
CULTURE: NORMAL
CULTURE: NORMAL
DIFFERENTIAL TYPE: ABNORMAL
EKG ATRIAL RATE: 117 BPM
EKG DIAGNOSIS: NORMAL
EKG P-R INTERVAL: 216 MS
EKG Q-T INTERVAL: 310 MS
EKG QRS DURATION: 76 MS
EKG QTC CALCULATION (BAZETT): 432 MS
EKG R AXIS: -11 DEGREES
EKG T AXIS: 154 DEGREES
EKG VENTRICULAR RATE: 117 BPM
EOSINOPHILS ABSOLUTE: 0.4 K/CU MM
EOSINOPHILS RELATIVE PERCENT: 5.8 % (ref 0–3)
GFR AFRICAN AMERICAN: >60 ML/MIN/1.73M2
GFR NON-AFRICAN AMERICAN: >60 ML/MIN/1.73M2
GLUCOSE BLD-MCNC: 100 MG/DL (ref 70–99)
GLUCOSE BLD-MCNC: 104 MG/DL (ref 70–99)
GLUCOSE BLD-MCNC: 106 MG/DL (ref 70–99)
GLUCOSE BLD-MCNC: 98 MG/DL (ref 70–99)
GLUCOSE BLD-MCNC: 99 MG/DL (ref 70–99)
HCT VFR BLD CALC: 25.7 % (ref 37–47)
HEMOGLOBIN: 7.6 GM/DL (ref 12.5–16)
IMMATURE NEUTROPHIL %: 0.5 % (ref 0–0.43)
LYMPHOCYTES ABSOLUTE: 1 K/CU MM
LYMPHOCYTES RELATIVE PERCENT: 15.4 % (ref 24–44)
Lab: NORMAL
Lab: NORMAL
MCH RBC QN AUTO: 27.4 PG (ref 27–31)
MCHC RBC AUTO-ENTMCNC: 29.6 % (ref 32–36)
MCV RBC AUTO: 92.8 FL (ref 78–100)
MONOCYTES ABSOLUTE: 0.5 K/CU MM
MONOCYTES RELATIVE PERCENT: 8 % (ref 0–4)
NUCLEATED RBC %: 0 %
PDW BLD-RTO: 17.7 % (ref 11.7–14.9)
PLATELET # BLD: 308 K/CU MM (ref 140–440)
PMV BLD AUTO: 9.9 FL (ref 7.5–11.1)
POTASSIUM SERPL-SCNC: 4.6 MMOL/L (ref 3.5–5.1)
RBC # BLD: 2.77 M/CU MM (ref 4.2–5.4)
SEGMENTED NEUTROPHILS ABSOLUTE COUNT: 4.3 K/CU MM
SEGMENTED NEUTROPHILS RELATIVE PERCENT: 69.2 % (ref 36–66)
SODIUM BLD-SCNC: 137 MMOL/L (ref 135–145)
SPECIMEN: NORMAL
SPECIMEN: NORMAL
TOTAL IMMATURE NEUTOROPHIL: 0.03 K/CU MM
TOTAL NUCLEATED RBC: 0 K/CU MM
TOTAL PROTEIN: 4.6 GM/DL (ref 6.4–8.2)
WBC # BLD: 6.2 K/CU MM (ref 4–10.5)

## 2020-05-15 PROCEDURE — 94761 N-INVAS EAR/PLS OXIMETRY MLT: CPT

## 2020-05-15 PROCEDURE — 2580000003 HC RX 258: Performed by: NURSE PRACTITIONER

## 2020-05-15 PROCEDURE — 6360000002 HC RX W HCPCS: Performed by: HOSPITALIST

## 2020-05-15 PROCEDURE — 1200000000 HC SEMI PRIVATE

## 2020-05-15 PROCEDURE — C9113 INJ PANTOPRAZOLE SODIUM, VIA: HCPCS | Performed by: HOSPITALIST

## 2020-05-15 PROCEDURE — 85025 COMPLETE CBC W/AUTO DIFF WBC: CPT

## 2020-05-15 PROCEDURE — 82962 GLUCOSE BLOOD TEST: CPT

## 2020-05-15 PROCEDURE — 6370000000 HC RX 637 (ALT 250 FOR IP): Performed by: STUDENT IN AN ORGANIZED HEALTH CARE EDUCATION/TRAINING PROGRAM

## 2020-05-15 PROCEDURE — 6370000000 HC RX 637 (ALT 250 FOR IP): Performed by: NURSE PRACTITIONER

## 2020-05-15 PROCEDURE — 80053 COMPREHEN METABOLIC PANEL: CPT

## 2020-05-15 PROCEDURE — 6370000000 HC RX 637 (ALT 250 FOR IP): Performed by: SPECIALIST

## 2020-05-15 RX ADMIN — SODIUM CHLORIDE, PRESERVATIVE FREE 10 ML: 5 INJECTION INTRAVENOUS at 21:28

## 2020-05-15 RX ADMIN — ANASTROZOLE 1 MG: 1 TABLET, COATED ORAL at 09:46

## 2020-05-15 RX ADMIN — LEVOTHYROXINE SODIUM 50 MCG: 50 TABLET ORAL at 06:00

## 2020-05-15 RX ADMIN — PRAVASTATIN SODIUM 40 MG: 40 TABLET ORAL at 09:47

## 2020-05-15 RX ADMIN — OXYCODONE HYDROCHLORIDE 5 MG: 5 TABLET ORAL at 23:28

## 2020-05-15 RX ADMIN — SODIUM CHLORIDE, PRESERVATIVE FREE 10 ML: 5 INJECTION INTRAVENOUS at 09:46

## 2020-05-15 RX ADMIN — POLYETHYLENE GLYCOL 3350, SODIUM SULFATE ANHYDROUS, SODIUM BICARBONATE, SODIUM CHLORIDE, POTASSIUM CHLORIDE 4000 ML: 236; 22.74; 6.74; 5.86; 2.97 POWDER, FOR SOLUTION ORAL at 17:52

## 2020-05-15 RX ADMIN — DILTIAZEM HYDROCHLORIDE 240 MG: 240 CAPSULE, COATED, EXTENDED RELEASE ORAL at 09:46

## 2020-05-15 RX ADMIN — METOPROLOL SUCCINATE 25 MG: 25 TABLET, EXTENDED RELEASE ORAL at 09:46

## 2020-05-15 RX ADMIN — PANTOPRAZOLE SODIUM 40 MG: 40 INJECTION, POWDER, FOR SOLUTION INTRAVENOUS at 06:00

## 2020-05-15 RX ADMIN — FERROUS SULFATE TAB 325 MG (65 MG ELEMENTAL FE) 325 MG: 325 (65 FE) TAB at 09:46

## 2020-05-15 RX ADMIN — METOPROLOL SUCCINATE 25 MG: 25 TABLET, EXTENDED RELEASE ORAL at 21:28

## 2020-05-15 RX ADMIN — PANTOPRAZOLE SODIUM 40 MG: 40 INJECTION, POWDER, FOR SOLUTION INTRAVENOUS at 17:52

## 2020-05-15 ASSESSMENT — PAIN DESCRIPTION - PAIN TYPE: TYPE: CHRONIC PAIN

## 2020-05-15 ASSESSMENT — PAIN SCALES - GENERAL
PAINLEVEL_OUTOF10: 0
PAINLEVEL_OUTOF10: 10

## 2020-05-15 ASSESSMENT — PAIN DESCRIPTION - FREQUENCY: FREQUENCY: INTERMITTENT

## 2020-05-15 ASSESSMENT — PAIN - FUNCTIONAL ASSESSMENT: PAIN_FUNCTIONAL_ASSESSMENT: ACTIVITIES ARE NOT PREVENTED

## 2020-05-15 ASSESSMENT — PAIN DESCRIPTION - LOCATION: LOCATION: BACK;LEG

## 2020-05-15 ASSESSMENT — PAIN DESCRIPTION - ORIENTATION: ORIENTATION: MID;LOWER

## 2020-05-15 ASSESSMENT — PAIN DESCRIPTION - DESCRIPTORS: DESCRIPTORS: ACHING

## 2020-05-15 ASSESSMENT — PAIN DESCRIPTION - PROGRESSION: CLINICAL_PROGRESSION: NOT CHANGED

## 2020-05-15 ASSESSMENT — PAIN DESCRIPTION - ONSET: ONSET: GRADUAL

## 2020-05-15 NOTE — CARE COORDINATION
Spoke with pt and plan remains home with brother. Discussed HC but she declined stating her brother takes care of all her needs. CM will continue to follow.

## 2020-05-15 NOTE — PROGRESS NOTES
Department of Internal Medicine  General Internal Medicine  Attending Progress Note      SUBJECTIVE: Patient denied any new complaints  No acute overnight events  C scope tomorrow    OBJECTIVE      Medications    Current Facility-Administered Medications: polyethylene glycol (GoLYTELY) solution 4,000 mL, 4,000 mL, Oral, Once  lactated ringers infusion, , Intravenous, Continuous  pantoprazole (PROTONIX) injection 40 mg, 40 mg, Intravenous, BID  0.9 % sodium chloride infusion, 50 mL, Intravenous, Once  sodium chloride flush 0.9 % injection 10 mL, 10 mL, Intravenous, 2 times per day  sodium chloride flush 0.9 % injection 10 mL, 10 mL, Intravenous, PRN  acetaminophen (TYLENOL) tablet 650 mg, 650 mg, Oral, Q6H PRN **OR** acetaminophen (TYLENOL) suppository 650 mg, 650 mg, Rectal, Q6H PRN  polyethylene glycol (GLYCOLAX) packet 17 g, 17 g, Oral, Daily PRN  promethazine (PHENERGAN) tablet 12.5 mg, 12.5 mg, Oral, Q6H PRN **OR** ondansetron (ZOFRAN) injection 4 mg, 4 mg, Intravenous, Q6H PRN  dilTIAZem (CARDIZEM CD) extended release capsule 240 mg, 240 mg, Oral, Daily  levothyroxine (SYNTHROID) tablet 50 mcg, 50 mcg, Oral, Daily  pravastatin (PRAVACHOL) tablet 40 mg, 40 mg, Oral, Daily  metoprolol succinate (TOPROL XL) extended release tablet 25 mg, 25 mg, Oral, BID  ferrous sulfate (IRON 325) tablet 325 mg, 325 mg, Oral, Daily with breakfast  anastrozole (ARIMIDEX) tablet 1 mg, 1 mg, Oral, Daily  insulin lispro (HUMALOG) injection vial 0-6 Units, 0-6 Units, Subcutaneous, TID WC  insulin lispro (HUMALOG) injection vial 0-3 Units, 0-3 Units, Subcutaneous, Nightly  glucose (GLUTOSE) 40 % oral gel 15 g, 15 g, Oral, PRN  dextrose 50 % IV solution, 12.5 g, Intravenous, PRN  glucagon (rDNA) injection 1 mg, 1 mg, Intramuscular, PRN  dextrose 5 % solution, 100 mL/hr, Intravenous, PRN  oxyCODONE (ROXICODONE) immediate release tablet 5 mg, 5 mg, Oral, Q4H PRN  Physical    VITALS:  BP (!) 115/57   Pulse 70   Temp 97.5 °F (36.4 °C) (Oral)   Resp 18   Ht 5' 5\" (1.651 m)   Wt 182 lb (82.6 kg)   SpO2 98%   BMI 30.29 kg/m²      Gen-  A& O x 3  HEENT  - PERRLA, EOMI  CV - RRR no M/g/R, normal s1, s2  Lungs - CTA bilaterally no W/C/R  Abd - soft, NT, ND  Ext - no cyanosis or edema    Data    CBC:   Lab Results   Component Value Date    WBC 6.2 05/15/2020    RBC 2.77 05/15/2020    HGB 7.6 05/15/2020    HCT 25.7 05/15/2020    MCV 92.8 05/15/2020    MCH 27.4 05/15/2020    MCHC 29.6 05/15/2020    RDW 17.7 05/15/2020     05/15/2020    MPV 9.9 05/15/2020       ASSESSMENT AND PLAN        Anemia, acute on chronic, with acidosis   continue Protonix and EGD findings below. C scope planned tomorrow   -[facility] lesions in the hiatal hernial sac, 1 cm short segment Lawler's esophagus. 2 cm benign-appearing rounded also noted in the upper body of the stomach. Received kcentra and vitamin K in ED   s/p 5 unit PRBC transfusion. 1. Hyperkalemia, 5.5  -Improved with Kayexalate. 2. Shortness of breath - seems to have resolved. Will transfer to floor if she is negative for COVID19  - COVID 19 pending, should be back today      3. Elevated trop  Due to demand from anemia. Denies CP  Serial trop  -recheck troponin. 4. Hiatal hernia   -Seen on CT chest    PT/OT evaluation for [place] [address] M.  Antwan Prabhakar MD  Hospitalist, [place] [facility]

## 2020-05-15 NOTE — PLAN OF CARE
Problem: Pain:  Description: Pain management should include both nonpharmacologic and pharmacologic interventions.   Goal: Pain level will decrease  Description: Pain level will decrease  Outcome: Ongoing  Goal: Control of acute pain  Description: Control of acute pain  Outcome: Ongoing  Goal: Control of chronic pain  Description: Control of chronic pain  Outcome: Ongoing     Problem: Falls - Risk of:  Goal: Will remain free from falls  Description: Will remain free from falls  Outcome: Ongoing  Goal: Absence of physical injury  Description: Absence of physical injury  Outcome: Ongoing     Problem: Discharge Planning:  Goal: Discharged to appropriate level of care  Description: Discharged to appropriate level of care  Outcome: Ongoing

## 2020-05-15 NOTE — PROGRESS NOTES
Physical Therapy  Attempted, patient reports she ambulated x400ft with nursing, not wanting to get back up at this time, plans to return home upon d/c.

## 2020-05-16 ENCOUNTER — ANESTHESIA (OUTPATIENT)
Dept: ENDOSCOPY | Age: 75
DRG: 378 | End: 2020-05-16
Payer: MEDICARE

## 2020-05-16 ENCOUNTER — ANESTHESIA EVENT (OUTPATIENT)
Dept: ENDOSCOPY | Age: 75
DRG: 378 | End: 2020-05-16
Payer: MEDICARE

## 2020-05-16 VITALS — DIASTOLIC BLOOD PRESSURE: 46 MMHG | SYSTOLIC BLOOD PRESSURE: 93 MMHG | OXYGEN SATURATION: 100 %

## 2020-05-16 LAB
ALBUMIN SERPL-MCNC: 2.4 GM/DL (ref 3.4–5)
ALP BLD-CCNC: 57 IU/L (ref 40–128)
ALT SERPL-CCNC: 10 U/L (ref 10–40)
ANION GAP SERPL CALCULATED.3IONS-SCNC: 8 MMOL/L (ref 4–16)
APTT: 28 SECONDS (ref 25.1–37.1)
AST SERPL-CCNC: 10 IU/L (ref 15–37)
BASOPHILS ABSOLUTE: 0.1 K/CU MM
BASOPHILS RELATIVE PERCENT: 1.4 % (ref 0–1)
BILIRUB SERPL-MCNC: 0.5 MG/DL (ref 0–1)
BUN BLDV-MCNC: 6 MG/DL (ref 6–23)
CALCIUM SERPL-MCNC: 7.8 MG/DL (ref 8.3–10.6)
CHLORIDE BLD-SCNC: 108 MMOL/L (ref 99–110)
CO2: 22 MMOL/L (ref 21–32)
CREAT SERPL-MCNC: 0.8 MG/DL (ref 0.6–1.1)
DIFFERENTIAL TYPE: ABNORMAL
EOSINOPHILS ABSOLUTE: 0.3 K/CU MM
EOSINOPHILS RELATIVE PERCENT: 5.4 % (ref 0–3)
GFR AFRICAN AMERICAN: >60 ML/MIN/1.73M2
GFR NON-AFRICAN AMERICAN: >60 ML/MIN/1.73M2
GLUCOSE BLD-MCNC: 118 MG/DL (ref 70–99)
GLUCOSE BLD-MCNC: 128 MG/DL (ref 70–99)
GLUCOSE BLD-MCNC: 89 MG/DL (ref 70–99)
GLUCOSE BLD-MCNC: 95 MG/DL (ref 70–99)
GLUCOSE BLD-MCNC: 98 MG/DL (ref 70–99)
HCT VFR BLD CALC: 26 % (ref 37–47)
HEMOGLOBIN: 7.6 GM/DL (ref 12.5–16)
IMMATURE NEUTROPHIL %: 0.3 % (ref 0–0.43)
INR BLD: 1.17 INDEX
LYMPHOCYTES ABSOLUTE: 0.8 K/CU MM
LYMPHOCYTES RELATIVE PERCENT: 13.3 % (ref 24–44)
MCH RBC QN AUTO: 27.1 PG (ref 27–31)
MCHC RBC AUTO-ENTMCNC: 29.2 % (ref 32–36)
MCV RBC AUTO: 92.9 FL (ref 78–100)
MONOCYTES ABSOLUTE: 0.4 K/CU MM
MONOCYTES RELATIVE PERCENT: 6.5 % (ref 0–4)
NUCLEATED RBC %: 0 %
PDW BLD-RTO: 17 % (ref 11.7–14.9)
PLATELET # BLD: 320 K/CU MM (ref 140–440)
PMV BLD AUTO: 9.4 FL (ref 7.5–11.1)
POTASSIUM SERPL-SCNC: 4.2 MMOL/L (ref 3.5–5.1)
PROTHROMBIN TIME: 14.2 SECONDS (ref 11.7–14.5)
RBC # BLD: 2.8 M/CU MM (ref 4.2–5.4)
SEGMENTED NEUTROPHILS ABSOLUTE COUNT: 4.6 K/CU MM
SEGMENTED NEUTROPHILS RELATIVE PERCENT: 73.1 % (ref 36–66)
SODIUM BLD-SCNC: 138 MMOL/L (ref 135–145)
TOTAL IMMATURE NEUTOROPHIL: 0.02 K/CU MM
TOTAL NUCLEATED RBC: 0 K/CU MM
TOTAL PROTEIN: 4.3 GM/DL (ref 6.4–8.2)
WBC # BLD: 6.3 K/CU MM (ref 4–10.5)

## 2020-05-16 PROCEDURE — 0DJD8ZZ INSPECTION OF LOWER INTESTINAL TRACT, VIA NATURAL OR ARTIFICIAL OPENING ENDOSCOPIC: ICD-10-PCS | Performed by: SPECIALIST

## 2020-05-16 PROCEDURE — 6370000000 HC RX 637 (ALT 250 FOR IP): Performed by: STUDENT IN AN ORGANIZED HEALTH CARE EDUCATION/TRAINING PROGRAM

## 2020-05-16 PROCEDURE — 82962 GLUCOSE BLOOD TEST: CPT

## 2020-05-16 PROCEDURE — 6370000000 HC RX 637 (ALT 250 FOR IP): Performed by: NURSE PRACTITIONER

## 2020-05-16 PROCEDURE — 2580000003 HC RX 258: Performed by: NURSE PRACTITIONER

## 2020-05-16 PROCEDURE — 85025 COMPLETE CBC W/AUTO DIFF WBC: CPT

## 2020-05-16 PROCEDURE — 97161 PT EVAL LOW COMPLEX 20 MIN: CPT

## 2020-05-16 PROCEDURE — 2709999900 HC NON-CHARGEABLE SUPPLY: Performed by: SPECIALIST

## 2020-05-16 PROCEDURE — 1200000000 HC SEMI PRIVATE

## 2020-05-16 PROCEDURE — C9113 INJ PANTOPRAZOLE SODIUM, VIA: HCPCS | Performed by: HOSPITALIST

## 2020-05-16 PROCEDURE — 3609027000 HC COLONOSCOPY: Performed by: SPECIALIST

## 2020-05-16 PROCEDURE — 2500000003 HC RX 250 WO HCPCS: Performed by: NURSE ANESTHETIST, CERTIFIED REGISTERED

## 2020-05-16 PROCEDURE — 6360000002 HC RX W HCPCS: Performed by: NURSE ANESTHETIST, CERTIFIED REGISTERED

## 2020-05-16 PROCEDURE — 3700000000 HC ANESTHESIA ATTENDED CARE: Performed by: SPECIALIST

## 2020-05-16 PROCEDURE — 94761 N-INVAS EAR/PLS OXIMETRY MLT: CPT

## 2020-05-16 PROCEDURE — 85730 THROMBOPLASTIN TIME PARTIAL: CPT

## 2020-05-16 PROCEDURE — 97535 SELF CARE MNGMENT TRAINING: CPT

## 2020-05-16 PROCEDURE — 3700000001 HC ADD 15 MINUTES (ANESTHESIA): Performed by: SPECIALIST

## 2020-05-16 PROCEDURE — 80048 BASIC METABOLIC PNL TOTAL CA: CPT

## 2020-05-16 PROCEDURE — 80053 COMPREHEN METABOLIC PANEL: CPT

## 2020-05-16 PROCEDURE — 97166 OT EVAL MOD COMPLEX 45 MIN: CPT

## 2020-05-16 PROCEDURE — 6360000002 HC RX W HCPCS: Performed by: HOSPITALIST

## 2020-05-16 PROCEDURE — 85610 PROTHROMBIN TIME: CPT

## 2020-05-16 RX ORDER — LIDOCAINE HYDROCHLORIDE 20 MG/ML
INJECTION, SOLUTION INFILTRATION; PERINEURAL PRN
Status: DISCONTINUED | OUTPATIENT
Start: 2020-05-16 | End: 2020-05-16 | Stop reason: SDUPTHER

## 2020-05-16 RX ORDER — PROPOFOL 10 MG/ML
INJECTION, EMULSION INTRAVENOUS PRN
Status: DISCONTINUED | OUTPATIENT
Start: 2020-05-16 | End: 2020-05-16 | Stop reason: SDUPTHER

## 2020-05-16 RX ADMIN — OXYCODONE HYDROCHLORIDE 5 MG: 5 TABLET ORAL at 20:56

## 2020-05-16 RX ADMIN — LIDOCAINE HYDROCHLORIDE 100 MG: 20 INJECTION, SOLUTION INFILTRATION; PERINEURAL at 10:22

## 2020-05-16 RX ADMIN — SODIUM CHLORIDE, PRESERVATIVE FREE 10 ML: 5 INJECTION INTRAVENOUS at 20:57

## 2020-05-16 RX ADMIN — METOPROLOL SUCCINATE 25 MG: 25 TABLET, EXTENDED RELEASE ORAL at 20:56

## 2020-05-16 RX ADMIN — SODIUM CHLORIDE, PRESERVATIVE FREE 10 ML: 5 INJECTION INTRAVENOUS at 06:58

## 2020-05-16 RX ADMIN — SODIUM CHLORIDE, PRESERVATIVE FREE 10 ML: 5 INJECTION INTRAVENOUS at 09:00

## 2020-05-16 RX ADMIN — PANTOPRAZOLE SODIUM 40 MG: 40 INJECTION, POWDER, FOR SOLUTION INTRAVENOUS at 06:58

## 2020-05-16 RX ADMIN — PROPOFOL 200 MG: 10 INJECTION, EMULSION INTRAVENOUS at 10:22

## 2020-05-16 RX ADMIN — LEVOTHYROXINE SODIUM 50 MCG: 50 TABLET ORAL at 06:58

## 2020-05-16 RX ADMIN — PANTOPRAZOLE SODIUM 40 MG: 40 INJECTION, POWDER, FOR SOLUTION INTRAVENOUS at 18:06

## 2020-05-16 ASSESSMENT — PAIN SCALES - GENERAL
PAINLEVEL_OUTOF10: 10
PAINLEVEL_OUTOF10: 10

## 2020-05-16 ASSESSMENT — PAIN DESCRIPTION - DESCRIPTORS: DESCRIPTORS: ACHING

## 2020-05-16 ASSESSMENT — PAIN DESCRIPTION - LOCATION: LOCATION: HIP;LEG

## 2020-05-16 ASSESSMENT — PAIN - FUNCTIONAL ASSESSMENT: PAIN_FUNCTIONAL_ASSESSMENT: PREVENTS OR INTERFERES SOME ACTIVE ACTIVITIES AND ADLS

## 2020-05-16 ASSESSMENT — PAIN DESCRIPTION - FREQUENCY: FREQUENCY: CONTINUOUS

## 2020-05-16 ASSESSMENT — PAIN DESCRIPTION - ONSET: ONSET: ON-GOING

## 2020-05-16 ASSESSMENT — PAIN DESCRIPTION - PROGRESSION: CLINICAL_PROGRESSION: NOT CHANGED

## 2020-05-16 ASSESSMENT — PAIN DESCRIPTION - PAIN TYPE: TYPE: CHRONIC PAIN;ACUTE PAIN

## 2020-05-16 NOTE — PROGRESS NOTES
Patient refused to drink anymore of the GoLytely in preparation for her colonoscopy in the morning. She drank approx. 2000mL. Stools were watery and green in color.

## 2020-05-16 NOTE — PROGRESS NOTES
UE supported    Ambulation:  SBA-Supervision c RW to/from  [place]. No intolerance, no significant unsteadiness. Activity tolerance  WFL but below baseline due to bowel incontinence and reduced mobility during admission contributing to mild deconditioning. Assessment:  Assessment  Performance deficits / Impairments: Decreased high-level IADLs, Decreased ADL status, Decreased endurance  Treatment Diagnosis: anemia  Prognosis: Good  Decision Making: Low Complexity  REQUIRES OT FOLLOW UP: No(needs regulary mobility with nursing staff during this admission to prevent deconditioning)  Discharge Recommendations: 24 hour supervision or assist, Home with Home health OT(68 y.o. F admitted with anemia and possible GI bleed ; at baseline lives with brother and JOSÉ MIGUEL and is modified indep c household mobilty and ADL ; she is now only slightly below her baseline but is predicted to be able to safely return home )      Goals:  By d/c or goals met:         Plan:  Plan  Times per week: n/a      Recommendation for activity with nursing staff:  ambulate to bathroom with RW for toileting    Treatment today:    Self Care Training:   Self care training was performed today. Cues were given for safety, sequence, UE/LE placement, visual cues, and balance. Activities performed today included dressing, toileting, hand hygiene, and grooming. Education: Role of OT, OT POC, d/c needs, home safety    Safety: Left in chair with all needs in reach. Gait belt used for transfer and mobility. Time in:  0806  Time out:  0830  Timed treatment minutes:  15  Total treatment time:  24    Electronically signed by:    [address]L, [place]   [id number]   11:20 AM, 5/16/2020

## 2020-05-16 NOTE — PROGRESS NOTES
Physical Therapy    Facility/Department: [facility]  Initial Assessment    NAME: Live Rucker  : 1945  MRN: [id number]    Date of Service: 2020    Discharge Recommendations:  Home with Home health PT        Assessment   Body structures, Functions, Activity limitations: Decreased functional mobility ; Decreased balance;Decreased ADL status  Assessment: Pt is a 69 y.o. female who is currently functioning below baseline and would benefit from skilled PT while in acute care. Pt would also benefit from [place] PT upon discharge to continue to address impairments. Decision Making: Low Complexity  PT Education: Goals;PT Role;Plan of Care;Transfer Training  REQUIRES PT FOLLOW UP: Yes  Activity Tolerance  Activity Tolerance: Patient Tolerated treatment well       Patient Diagnosis(es): The primary encounter diagnosis was Iron deficiency anemia due to chronic blood loss. Diagnoses of Lactic acidosis and Guaiac positive stools were also pertinent to this visit. has a past medical history of Arthritis and Cancer ([facility] [address] has a past surgical history that includes Tonsillectomy; Tubal ligation; Breast surgery; Cholecystectomy, laparoscopic (N/A, 2019); Upper gastrointestinal endoscopy (N/A, 2020); and Colonoscopy (N/A, 2020). Restrictions    General Precautions    Vision/Hearing      WFL  Subjective  \"You guys already tricked me into going to therapy once, I'm not doing that again\"    Pt denies pain.     Orientation  Orientation  Overall Orientation Status: Within Normal Limits     Social/Functional History    Lives With: Family(brother and his wife)  Type of Home: House  Home Layout: Two level(goes up/down stairs due to bedroom/bathroom upstairs)  Home Access: Stairs to enter with rails  Entrance Stairs - Number of Steps: 4  Entrance Stairs - Rails: Both  Bathroom Shower/Tub: Tub/Shower unit  Bathroom Toilet: Standard  Bathroom Accessibility: Accessible  Home Equipment: (no AD)  ADL Assistance: mod I  Short term goal 2: Pt to complete all STS transfers to/from bed mod I  Short term goal 3: Pt to ambulate 150' LRAD mod I       Therapy Time   Individual Concurrent Group Co-treatment   Time In 1415         Time Out 1428         Minutes [address] [place] [address]

## 2020-05-16 NOTE — PROGRESS NOTES
Department of Internal Medicine  General Internal Medicine  Attending Progress Note      SUBJECTIVE: Patient denied any new complaints  No acute overnight events  C scope today    OBJECTIVE      Medications    Current Facility-Administered Medications: lactated ringers infusion, , Intravenous, Continuous  pantoprazole (PROTONIX) injection 40 mg, 40 mg, Intravenous, BID  0.9 % sodium chloride infusion, 50 mL, Intravenous, Once  sodium chloride flush 0.9 % injection 10 mL, 10 mL, Intravenous, 2 times per day  sodium chloride flush 0.9 % injection 10 mL, 10 mL, Intravenous, PRN  acetaminophen (TYLENOL) tablet 650 mg, 650 mg, Oral, Q6H PRN **OR** acetaminophen (TYLENOL) suppository 650 mg, 650 mg, Rectal, Q6H PRN  polyethylene glycol (GLYCOLAX) packet 17 g, 17 g, Oral, Daily PRN  promethazine (PHENERGAN) tablet 12.5 mg, 12.5 mg, Oral, Q6H PRN **OR** ondansetron (ZOFRAN) injection 4 mg, 4 mg, Intravenous, Q6H PRN  dilTIAZem (CARDIZEM CD) extended release capsule 240 mg, 240 mg, Oral, Daily  levothyroxine (SYNTHROID) tablet 50 mcg, 50 mcg, Oral, Daily  pravastatin (PRAVACHOL) tablet 40 mg, 40 mg, Oral, Daily  metoprolol succinate (TOPROL XL) extended release tablet 25 mg, 25 mg, Oral, BID  ferrous sulfate (IRON 325) tablet 325 mg, 325 mg, Oral, Daily with breakfast  anastrozole (ARIMIDEX) tablet 1 mg, 1 mg, Oral, Daily  insulin lispro (HUMALOG) injection vial 0-6 Units, 0-6 Units, Subcutaneous, TID WC  insulin lispro (HUMALOG) injection vial 0-3 Units, 0-3 Units, Subcutaneous, Nightly  glucose (GLUTOSE) 40 % oral gel 15 g, 15 g, Oral, PRN  dextrose 50 % IV solution, 12.5 g, Intravenous, PRN  glucagon (rDNA) injection 1 mg, 1 mg, Intramuscular, PRN  dextrose 5 % solution, 100 mL/hr, Intravenous, PRN  oxyCODONE (ROXICODONE) immediate release tablet 5 mg, 5 mg, Oral, Q4H PRN  Physical    VITALS:  BP (!) 104/51   Pulse 65   Temp 97.9 °F (36.6 °C) (Oral)   Resp 22   Ht 5' 5\" (1.651 m)   Wt 174 lb 11.2 oz (79.2 kg)   SpO2 94%   BMI 29.07 kg/m²      Gen-  A& O x 3  HEENT  - PERRLA, EOMI  CV - RRR no M/g/R, normal s1, s2  Lungs - CTA bilaterally no W/C/R  Abd - soft, NT, ND  Ext - no cyanosis or edema    Data    CBC:   Lab Results   Component Value Date    WBC 6.3 05/16/2020    RBC 2.80 05/16/2020    HGB 7.6 05/16/2020    HCT 26.0 05/16/2020    MCV 92.9 05/16/2020    MCH 27.1 05/16/2020    MCHC 29.2 05/16/2020    RDW 17.0 05/16/2020     05/16/2020    MPV 9.4 05/16/2020       ASSESSMENT AND PLAN        Anemia, acute on chronic, with acidosis   continue Protonix and EGD findings below. C scope planned tomorrow   -[facility] lesions in the hiatal hernial sac, 1 cm short segment Lawler's esophagus. 2 cm benign-appearing rounded also noted in the upper body of the stomach. Received kcentra and vitamin K in ED   s/p 5 unit PRBC transfusion. 1. Hyperkalemia, 5.5  -Improved with Kayexalate. 2. Shortness of breath - seems to have resolved. Will transfer to floor if she is negative for COVID19  - COVID 19 pending, should be back today      3. Elevated trop  Due to demand from anemia. Denies CP  Serial trop  -recheck troponin. 4. Hiatal hernia   -Seen on CT chest    PT/OT evaluation for DC planning  Possible DC home tomorrow    Jojo Tang.  Katherine Pavon MD  Hospitalist, [place] [address] physicians

## 2020-05-16 NOTE — BRIEF OP NOTE
Brief Postoperative Note      Quincy Cotton is a 69 y.o. female     Pre-operative Diagnosis: ANEMIA  / GIT BLEEDING    Post-operative Diagnosis: D.COLI --HDS- SMALL AMOUNT -STOOL PRESENT    Procedure:COLONOSCOPY    Anesthesia: MAC    Surgeons/Assistants:Marcos Hill     Estimated Blood Loss: NONE    Complications: None    Specimens: were not obtained  REC  SBFT AS OUTPT    Marcos Hill   5/16/2020   10:57 AM

## 2020-05-16 NOTE — ANESTHESIA PRE PROCEDURE
05/10/20 1334    sodium chloride flush 0.9 % injection 10 mL  10 mL Intravenous 2 times per day Rosiland Myriam, APRN - NP   10 mL at 05/15/20 2128    sodium chloride flush 0.9 % injection 10 mL  10 mL Intravenous PRN Rosiland Myriam, APRN - NP   10 mL at 05/16/20 0658    acetaminophen (TYLENOL) tablet 650 mg  650 mg Oral Q6H PRN Rosiland Myriam, APRN - NP        Or   Iraida Manna acetaminophen (TYLENOL) suppository 650 mg  650 mg Rectal Q6H PRN Rosiland Myriam, APRN - NP        polyethylene glycol (GLYCOLAX) packet 17 g  17 g Oral Daily PRN Rosiland Myriam, APRN - NP   17 g at 05/13/20 0845    promethazine (PHENERGAN) tablet 12.5 mg  12.5 mg Oral Q6H PRN Rosiland Myriam, APRN - NP        Or    ondansetron (ZOFRAN) injection 4 mg  4 mg Intravenous Q6H PRN Rosiland Myriam, APRN - NP        dilTIAZem (CARDIZEM CD) extended release capsule 240 mg  240 mg Oral Daily Rosiland Myriam, APRN - NP   240 mg at 05/15/20 0946    levothyroxine (SYNTHROID) tablet 50 mcg  50 mcg Oral Daily Rosiland Myriam, APRN - NP   50 mcg at 05/16/20 0658    pravastatin (PRAVACHOL) tablet 40 mg  40 mg Oral Daily Rosiland Myriam, APRN - NP   40 mg at 05/15/20 0947    metoprolol succinate (TOPROL XL) extended release tablet 25 mg  25 mg Oral BID Rosiland Myriam, APRN - NP   25 mg at 05/15/20 2128    ferrous sulfate (IRON 325) tablet 325 mg  325 mg Oral Daily with breakfast Rosiland Myriam, APRN - NP   325 mg at 05/15/20 0946    anastrozole (ARIMIDEX) tablet 1 mg  1 mg Oral Daily Rosiland Myriam, APRN - NP   1 mg at 05/15/20 0946    insulin lispro (HUMALOG) injection vial 0-6 Units  0-6 Units Subcutaneous TID WC Rosiland Myriam, APRN - NP   Stopped at 05/15/20 1731    insulin lispro (HUMALOG) injection vial 0-3 Units  0-3 Units Subcutaneous Nightly Rosiland Myriam, APRN - NP   Stopped at 05/10/20 2247    glucose (GLUTOSE) 40 % oral gel 15 g  15 g Oral PRN Rosiland Myriam, APRN - NP        dextrose 50 % IV 36.6 °C (97.9 °F)   TempSrc: Oral Oral Oral Oral   SpO2: 98% 97% 100% 94%   Weight:    174 lb 11.2 oz (79.2 kg)   Height:                                                  BP Readings from Last 3 Encounters:   05/16/20 (!) 104/51   05/14/20 (!) 104/58   10/23/19 110/80       NPO Status: Time of last liquid consumption: 2300                        Time of last solid consumption: 2000                        Date of last liquid consumption: 05/13/20                        Date of last solid food consumption: 05/13/20    BMI:   Wt Readings from Last 3 Encounters:   05/16/20 174 lb 11.2 oz (79.2 kg)   10/23/19 170 lb (77.1 kg)   10/04/19 222 lb 1.6 oz (100.7 kg)     Body mass index is 29.07 kg/m².     CBC:   Lab Results   Component Value Date    WBC 6.2 05/15/2020    RBC 2.77 05/15/2020    HGB 7.6 05/15/2020    HCT 25.7 05/15/2020    MCV 92.8 05/15/2020    RDW 17.7 05/15/2020     05/15/2020       CMP:   Lab Results   Component Value Date     05/15/2020    K 4.6 05/15/2020     05/15/2020    CO2 21 05/15/2020    BUN 8 05/15/2020    CREATININE 0.9 05/15/2020    GFRAA >60 05/15/2020    AGRATIO 1.2 11/07/2016    LABGLOM >60 05/15/2020    GLUCOSE 99 05/15/2020    PROT 4.6 05/15/2020    PROT 7.6 08/27/2012    CALCIUM 7.7 05/15/2020    BILITOT 0.5 05/15/2020    ALKPHOS 54 05/15/2020    AST 13 05/15/2020    ALT 10 05/15/2020       POC Tests:   Recent Labs     05/15/20  2126   POCGLU 98       Coags:   Lab Results   Component Value Date    PROTIME 16.0 05/11/2020    INR 1.32 05/11/2020    APTT 31.1 05/11/2020       HCG (If Applicable): No results found for: PREGTESTUR, PREGSERUM, HCG, HCGQUANT     ABGs: No results found for: PHART, PO2ART, JVD4XSU, KZB4YIY, BEART, M7PTJATC     Type & Screen (If Applicable):  No results found for: LABABO, LABRH    Drug/Infectious Status (If Applicable):  No results found for: HIV, HEPCAB    COVID-19 Screening (If Applicable):   Lab Results   Component Value Date    COVID19 NASOPHARYNGEAL SWAB  NOT DETECTED   05/10/2020         Anesthesia Evaluation  Patient summary reviewed and Nursing notes reviewed no history of anesthetic complications:   Airway: Mallampati: II  TM distance: >3 FB   Neck ROM: limited  Mouth opening: > = 3 FB Dental:          Pulmonary:Negative Pulmonary ROS and normal exam                               Cardiovascular:  Exercise tolerance: poor (<4 METS),   (+) hypertension:, dysrhythmias: atrial fibrillation, hyperlipidemia         Beta Blocker:  Dose within 24 Hrs      ROS comment: Stress test 9/2019:  Summary   Normal Stress nuclear scintigraphic study suggestive of normal myocardial   perfusion.   A fixed perfusion defect of inferior wall of left ventricle with normal wall   motion noted suggestive of diaphragmatic artifact.   Gated images demonstrate normal left ventricular systolic function with EF   of 60 %. Echo 9/2019:  Summary   Left ventricular function is normal, EF is estimated at 55-60%. Mild left ventricular hypertrophy. Mild tricuspid and mitral regurgitation noted. No evidence of pericardial effusion. Neuro/Psych:   Negative Neuro/Psych ROS              GI/Hepatic/Renal:   (+) GERD:, bowel prep,           Endo/Other:    (+) DiabetesType II DM, , malignancy/cancer. ROS comment: HX: breast cancer Abdominal:           Vascular: negative vascular ROS. Anesthesia Plan      MAC     ASA 4       Induction: intravenous. Anesthetic plan and risks discussed with patient. LORENZO Rahman CRNA   5/16/2020        Pre Anesthesia Evaluation complete. Anesthesia plan, risks, benefits, alternatives, and personnel discussed with patient and/or legal guardian. Patient and/or legal guardian verbalized an understanding and agreed to proceed. Anesthesia plan discussed with care team members and agreed upon.   LORENZO Rahman CRNA  5/16/2020

## 2020-05-17 VITALS
SYSTOLIC BLOOD PRESSURE: 137 MMHG | WEIGHT: 174.7 LBS | RESPIRATION RATE: 16 BRPM | TEMPERATURE: 97.5 F | OXYGEN SATURATION: 98 % | HEIGHT: 65 IN | HEART RATE: 72 BPM | DIASTOLIC BLOOD PRESSURE: 65 MMHG | BODY MASS INDEX: 29.11 KG/M2

## 2020-05-17 LAB
ALBUMIN SERPL-MCNC: 2.5 GM/DL (ref 3.4–5)
ALP BLD-CCNC: 57 IU/L (ref 40–129)
ALT SERPL-CCNC: 9 U/L (ref 10–40)
ANION GAP SERPL CALCULATED.3IONS-SCNC: 8 MMOL/L (ref 4–16)
AST SERPL-CCNC: 11 IU/L (ref 15–37)
BASOPHILS ABSOLUTE: 0.1 K/CU MM
BASOPHILS RELATIVE PERCENT: 1.4 % (ref 0–1)
BILIRUB SERPL-MCNC: 0.4 MG/DL (ref 0–1)
BUN BLDV-MCNC: 6 MG/DL (ref 6–23)
CALCIUM SERPL-MCNC: 7.7 MG/DL (ref 8.3–10.6)
CHLORIDE BLD-SCNC: 106 MMOL/L (ref 99–110)
CO2: 24 MMOL/L (ref 21–32)
CREAT SERPL-MCNC: 0.8 MG/DL (ref 0.6–1.1)
DIFFERENTIAL TYPE: ABNORMAL
EOSINOPHILS ABSOLUTE: 0.3 K/CU MM
EOSINOPHILS RELATIVE PERCENT: 5.7 % (ref 0–3)
GFR AFRICAN AMERICAN: >60 ML/MIN/1.73M2
GFR NON-AFRICAN AMERICAN: >60 ML/MIN/1.73M2
GLUCOSE BLD-MCNC: 117 MG/DL (ref 70–99)
GLUCOSE BLD-MCNC: 92 MG/DL (ref 70–99)
GLUCOSE BLD-MCNC: 92 MG/DL (ref 70–99)
HCT VFR BLD CALC: 26 % (ref 37–47)
HEMOGLOBIN: 7.6 GM/DL (ref 12.5–16)
IMMATURE NEUTROPHIL %: 0.3 % (ref 0–0.43)
LYMPHOCYTES ABSOLUTE: 0.9 K/CU MM
LYMPHOCYTES RELATIVE PERCENT: 14.8 % (ref 24–44)
MCH RBC QN AUTO: 27.1 PG (ref 27–31)
MCHC RBC AUTO-ENTMCNC: 29.2 % (ref 32–36)
MCV RBC AUTO: 92.9 FL (ref 78–100)
MONOCYTES ABSOLUTE: 0.5 K/CU MM
MONOCYTES RELATIVE PERCENT: 7.7 % (ref 0–4)
NUCLEATED RBC %: 0 %
PDW BLD-RTO: 16.9 % (ref 11.7–14.9)
PLATELET # BLD: 338 K/CU MM (ref 140–440)
PMV BLD AUTO: 9.4 FL (ref 7.5–11.1)
POTASSIUM SERPL-SCNC: 4.3 MMOL/L (ref 3.5–5.1)
RBC # BLD: 2.8 M/CU MM (ref 4.2–5.4)
SEGMENTED NEUTROPHILS ABSOLUTE COUNT: 4.1 K/CU MM
SEGMENTED NEUTROPHILS RELATIVE PERCENT: 70.1 % (ref 36–66)
SODIUM BLD-SCNC: 138 MMOL/L (ref 135–145)
TOTAL IMMATURE NEUTOROPHIL: 0.02 K/CU MM
TOTAL NUCLEATED RBC: 0 K/CU MM
TOTAL PROTEIN: 4.5 GM/DL (ref 6.4–8.2)
WBC # BLD: 5.8 K/CU MM (ref 4–10.5)

## 2020-05-17 PROCEDURE — 80048 BASIC METABOLIC PNL TOTAL CA: CPT

## 2020-05-17 PROCEDURE — 6360000002 HC RX W HCPCS: Performed by: HOSPITALIST

## 2020-05-17 PROCEDURE — 6370000000 HC RX 637 (ALT 250 FOR IP): Performed by: NURSE PRACTITIONER

## 2020-05-17 PROCEDURE — 80053 COMPREHEN METABOLIC PANEL: CPT

## 2020-05-17 PROCEDURE — 94761 N-INVAS EAR/PLS OXIMETRY MLT: CPT

## 2020-05-17 PROCEDURE — C9113 INJ PANTOPRAZOLE SODIUM, VIA: HCPCS | Performed by: HOSPITALIST

## 2020-05-17 PROCEDURE — 85025 COMPLETE CBC W/AUTO DIFF WBC: CPT

## 2020-05-17 PROCEDURE — 82962 GLUCOSE BLOOD TEST: CPT

## 2020-05-17 RX ORDER — PANTOPRAZOLE SODIUM 40 MG/1
40 TABLET, DELAYED RELEASE ORAL
Qty: 60 TABLET | Refills: 0 | Status: SHIPPED | OUTPATIENT
Start: 2020-05-17

## 2020-05-17 RX ADMIN — PRAVASTATIN SODIUM 40 MG: 40 TABLET ORAL at 09:21

## 2020-05-17 RX ADMIN — PANTOPRAZOLE SODIUM 40 MG: 40 INJECTION, POWDER, FOR SOLUTION INTRAVENOUS at 05:57

## 2020-05-17 RX ADMIN — METOPROLOL SUCCINATE 25 MG: 25 TABLET, EXTENDED RELEASE ORAL at 09:21

## 2020-05-17 RX ADMIN — DILTIAZEM HYDROCHLORIDE 240 MG: 240 CAPSULE, COATED, EXTENDED RELEASE ORAL at 09:21

## 2020-05-17 RX ADMIN — LEVOTHYROXINE SODIUM 50 MCG: 50 TABLET ORAL at 05:57

## 2020-05-17 RX ADMIN — ANASTROZOLE 1 MG: 1 TABLET, COATED ORAL at 09:21

## 2020-05-17 RX ADMIN — FERROUS SULFATE TAB 325 MG (65 MG ELEMENTAL FE) 325 MG: 325 (65 FE) TAB at 09:21

## 2020-05-17 NOTE — PROGRESS NOTES
PS message to Dr. Kevin Lombardi. Pt asking if spangler can be removed soon so she can see if she can void afterwards. In past unable to void and had to have it re-insertered.

## 2020-05-17 NOTE — PROGRESS NOTES
Discharge instructions given to pt, verbalizes understanding. Per pt request, called nursing supervisor to ask for pt pants (scrubs - pt doesn't have hers).

## 2020-05-17 NOTE — PROGRESS NOTES
5/17/2020 12:27 PM  Patient Room #: 7226/9347-L  Patient Name: Romeo Martin    (Step 1 Done by RN if possible otherwise call Pulmonary Diagnostics)  1. Place patient on room air at rest for at least 30 minutes. If patient falls below 88% before 30 minutes then you can record the level and stop. Record room air saturation level _97%. If patient is at 88% or below, they will qualify for home oxygen and you can stop. If level does not fall below 88%, fill in level above. If indicated continue to Step 2. Signature:Abby Her RRT Date:05/17/2020 ___  (Step 2&3 Done by [facility])  2. Ambulate patient on room air until saturation falls below 89%. Record level of room air saturation with ambulation___ %. Next, place patient back on ___lpm oxygen and ambulate, record level __%. (Note:  this level must show improvement from room air level done with ambulation.)  If patients saturation on room air with ambulation is 88% or below AND patient shows improvement with oxygen during ambulation, they will qualify for home oxygen and you can stop. If patient does not drop below 89%, then patient should have an overnight oximetry trending on room air to see if level falls below 88%. Complete level in Step 3 below. 3. Room air overnight oximetry level 88 % for___  cumulative minutes. If patients room air oxygen level is < 89% for at least 5 cumulative minutes, patient will qualify for home oxygen and you can stop. (Attach Night Trending Report)    Complete order below: Diagnosis:__________  Home oxygen at:  Length of Need: ? Lifetime ?  3 Months     ___lpm or __%   via  [] nasal cannula  []mask  [] other:___         []continuous []  with activity  []  Nocturnal   [] Portable Tanks []  Concentrator        Therapist Signature:Abby Her RRT_     Date: 05/17/ 2020  Physician Signature:  __Electronically Signed in EMR_    Date: ____    Physician Printed Name:  _________   NPI # _______    [] Patient Qualifies      [] Patient Does NOT qualify

## 2020-05-17 NOTE — PROGRESS NOTES
Called RT and spoke with [facility] regarding Home O2 eval.  She will start the paperwork. Pt needs to walk and be evaluated.

## 2020-05-17 NOTE — PROGRESS NOTES
Patient is being discharged today. If patient does NOT qualify for home oxygen via first or second step.   Patient will have to have an over night pulse ox order to do as outpatient

## 2020-05-17 NOTE — DISCHARGE SUMMARY
[facility] 1945 [id number]  PCP:  Chris Shea MD    Admit date: 5/10/2020  Admitting Physician: Philipp Meier DO    Discharge date: 5/17/2020 Discharge Physician: Tiffanie Daley MD         Discharge Diagnoses. As per below    Hospital Course:  History of present illness at admission: As per H&P  Subsequent Hospital Course:     Acute blood loss anemia: Possibly from upper GI bleed-EGD showing Roni   lesions and hiatal hernia sac, 1 cm short segment Lawler's esophagus. 2 cm benign-appearing rounded gastric ulcer noted. Biopsies were taken. C scope is normal-recommend outpatient small bowel follow-through. Current hemoglobin 7.6 which is relatively stable and no more bleeding. S/p 5 unit PRBC transfusion during the hospital course. Continue Protonix. Restarted back on anticoagulation low-dose Eliquis for paroxysmal A. fib. Advised follow-up with PCP. COVID 19- testing came out negative. Large hiatal hernia possibly patient might get benefit from surgery-to be determined by PCP. Hyperkalemia improved with Kayexalate. Current K4.3.  DC Guerrero      On the day of discharge, pt felt better. No new complaints.     Pertinent Exam Findings on Day of Discharge:  General Appearance:    Alert, cooperative, no distress, appears stated age  Head:    Normocephalic, without obvious abnormality, atraumatic  Eyes:    PERRL, conjunctiva/corneas clear, EOM's intact  Lungs:    Clear to auscultation bilaterally, respirations unlabored   Heart:    Regular rate and rhythm, S1 and S2 normal, no murmur,   rub or gallop  Abdomen:     Soft, non-tender, bowel sounds active, no masses, no organomegaly  Extremities:   Extremities normal, atraumatic, no cyanosis or edema    Consults:  IP CONSULT TO GI  IP CONSULT TO HOSPITALIST    Patient Instructions:   Cher Ellison   Home Medication Instructions [id number]    Printed on:05/17/20 1212   Medication Information                      anastrozole (ARIMIDEX) 1 MG tablet  Take 1 mg by mouth daily.                apixaban (ELIQUIS) 2.5 MG TABS tablet  Take 1 tablet by mouth 2 times daily             diltiazem (CARTIA XT) 240 MG extended release capsule  Take 1 capsule by mouth daily             ferrous sulfate 325 (65 Fe) MG tablet  Take 325 mg by mouth daily (with breakfast)             HYDROcodone-acetaminophen (NORCO) 5-325 MG per tablet  Take 1 tablet by mouth every 6 hours as needed for Pain.             levothyroxine (SYNTHROID) 50 MCG tablet  Take 1 tablet by mouth Daily             metFORMIN (GLUCOPHAGE) 500 MG tablet  Take 500 mg by mouth 2 times daily (with meals)             metoprolol (TOPROL-XL) 25 MG XL tablet  Take 25 mg by mouth 2 times daily              pantoprazole (PROTONIX) 40 MG tablet  Take 1 tablet by mouth 2 times daily (before meals)             pravastatin (PRAVACHOL) 40 MG tablet  Take 40 mg by mouth daily                   Diet:  DIET GENERAL; Carb Control: 5 carb choices (75 gms)/meal    Activity:   activity as tolerated     Discharge Condition:   good    Disposition:   home    Follow-up    Ana Cristina Sancehz MD  Go to  Medical Management and anticoagulation management  [id number] [facility] [id number] [phone]   Nikhil Morrell MD  Go to  Outpatient small bowel follow-through evaluation  [address] [facility]  [phone]       Time spent on discharge in the examination, evaluation, counseling and review of medications   and discharge plan: 33 minutes       Discharge Physician Signed: Jesus Hernandez

## 2021-07-12 ENCOUNTER — HOSPITAL ENCOUNTER (INPATIENT)
Age: 76
LOS: 4 days | Discharge: HOME | DRG: 394 | End: 2021-07-16
Attending: EMERGENCY MEDICINE | Admitting: INTERNAL MEDICINE
Payer: MEDICARE

## 2021-07-12 DIAGNOSIS — K62.5 RECTAL BLEEDING: ICD-10-CM

## 2021-07-12 DIAGNOSIS — E87.1 HYPONATREMIA: Primary | ICD-10-CM

## 2021-07-12 DIAGNOSIS — R77.8 ELEVATED TROPONIN: ICD-10-CM

## 2021-07-12 LAB
ABO/RH: NORMAL
ALBUMIN SERPL-MCNC: 4.3 GM/DL (ref 3.4–5)
ALP BLD-CCNC: 89 IU/L (ref 40–129)
ALT SERPL-CCNC: 11 U/L (ref 10–40)
ANION GAP SERPL CALCULATED.3IONS-SCNC: 12 MMOL/L (ref 4–16)
ANTIBODY SCREEN: NEGATIVE
APTT: 35.2 SECONDS (ref 25.1–37.1)
AST SERPL-CCNC: 13 IU/L (ref 15–37)
BACTERIA: ABNORMAL /HPF
BASOPHILS ABSOLUTE: 0.1 K/CU MM
BASOPHILS RELATIVE PERCENT: 1 % (ref 0–1)
BILIRUB SERPL-MCNC: 0.4 MG/DL (ref 0–1)
BILIRUBIN URINE: NEGATIVE MG/DL
BLOOD, URINE: NEGATIVE
BUN BLDV-MCNC: 9 MG/DL (ref 6–23)
CALCIUM SERPL-MCNC: 9.1 MG/DL (ref 8.3–10.6)
CHLORIDE BLD-SCNC: 85 MMOL/L (ref 99–110)
CLARITY: CLEAR
CO2: 21 MMOL/L (ref 21–32)
COLOR: YELLOW
CREAT SERPL-MCNC: 0.8 MG/DL (ref 0.6–1.1)
DIFFERENTIAL TYPE: ABNORMAL
EOSINOPHILS ABSOLUTE: 0.1 K/CU MM
EOSINOPHILS RELATIVE PERCENT: 1.4 % (ref 0–3)
GFR AFRICAN AMERICAN: >60 ML/MIN/1.73M2
GFR NON-AFRICAN AMERICAN: >60 ML/MIN/1.73M2
GLUCOSE BLD-MCNC: 133 MG/DL (ref 70–99)
GLUCOSE, URINE: NEGATIVE MG/DL
HCT VFR BLD CALC: 32.2 % (ref 37–47)
HCT VFR BLD CALC: 33 % (ref 37–47)
HEMOGLOBIN: 10.8 GM/DL (ref 12.5–16)
HEMOGLOBIN: 11.5 GM/DL (ref 12.5–16)
IMMATURE NEUTROPHIL %: 0.4 % (ref 0–0.43)
INR BLD: 1.23 INDEX
KETONES, URINE: NEGATIVE MG/DL
LEUKOCYTE ESTERASE, URINE: ABNORMAL
LIPASE: 42 IU/L (ref 13–60)
LYMPHOCYTES ABSOLUTE: 0.7 K/CU MM
LYMPHOCYTES RELATIVE PERCENT: 13 % (ref 24–44)
MCH RBC QN AUTO: 29.3 PG (ref 27–31)
MCHC RBC AUTO-ENTMCNC: 34.8 % (ref 32–36)
MCV RBC AUTO: 84.2 FL (ref 78–100)
MONOCYTES ABSOLUTE: 0.3 K/CU MM
MONOCYTES RELATIVE PERCENT: 6.8 % (ref 0–4)
NITRITE URINE, QUANTITATIVE: NEGATIVE
NUCLEATED RBC %: 0 %
PDW BLD-RTO: 13.7 % (ref 11.7–14.9)
PH, URINE: 7 (ref 5–8)
PLATELET # BLD: 425 K/CU MM (ref 140–440)
PMV BLD AUTO: 7.9 FL (ref 7.5–11.1)
POTASSIUM SERPL-SCNC: 4.9 MMOL/L (ref 3.5–5.1)
PROTEIN UA: NEGATIVE MG/DL
PROTHROMBIN TIME: 15.9 SECONDS (ref 11.7–14.5)
RBC # BLD: 3.92 M/CU MM (ref 4.2–5.4)
RBC URINE: 2 /HPF (ref 0–6)
SEGMENTED NEUTROPHILS ABSOLUTE COUNT: 3.9 K/CU MM
SEGMENTED NEUTROPHILS RELATIVE PERCENT: 77.4 % (ref 36–66)
SODIUM BLD-SCNC: 118 MMOL/L (ref 135–145)
SPECIFIC GRAVITY UA: 1.01 (ref 1–1.03)
SQUAMOUS EPITHELIAL: 4 /HPF
TOTAL IMMATURE NEUTOROPHIL: 0.02 K/CU MM
TOTAL NUCLEATED RBC: 0 K/CU MM
TOTAL PROTEIN: 7 GM/DL (ref 6.4–8.2)
TRICHOMONAS: ABNORMAL /HPF
TROPONIN T: 0.04 NG/ML
UROBILINOGEN, URINE: NEGATIVE MG/DL (ref 0.2–1)
WBC # BLD: 5 K/CU MM (ref 4–10.5)
WBC UA: 1 /HPF (ref 0–5)

## 2021-07-12 PROCEDURE — 2580000003 HC RX 258: Performed by: EMERGENCY MEDICINE

## 2021-07-12 PROCEDURE — 93005 ELECTROCARDIOGRAM TRACING: CPT | Performed by: EMERGENCY MEDICINE

## 2021-07-12 PROCEDURE — 83690 ASSAY OF LIPASE: CPT

## 2021-07-12 PROCEDURE — 80053 COMPREHEN METABOLIC PANEL: CPT

## 2021-07-12 PROCEDURE — 86900 BLOOD TYPING SEROLOGIC ABO: CPT

## 2021-07-12 PROCEDURE — 6370000000 HC RX 637 (ALT 250 FOR IP): Performed by: INTERNAL MEDICINE

## 2021-07-12 PROCEDURE — 85014 HEMATOCRIT: CPT

## 2021-07-12 PROCEDURE — 96360 HYDRATION IV INFUSION INIT: CPT

## 2021-07-12 PROCEDURE — 99285 EMERGENCY DEPT VISIT HI MDM: CPT

## 2021-07-12 PROCEDURE — 85610 PROTHROMBIN TIME: CPT

## 2021-07-12 PROCEDURE — 85018 HEMOGLOBIN: CPT

## 2021-07-12 PROCEDURE — 85025 COMPLETE CBC W/AUTO DIFF WBC: CPT

## 2021-07-12 PROCEDURE — 1200000000 HC SEMI PRIVATE

## 2021-07-12 PROCEDURE — 36415 COLL VENOUS BLD VENIPUNCTURE: CPT

## 2021-07-12 PROCEDURE — 84484 ASSAY OF TROPONIN QUANT: CPT

## 2021-07-12 PROCEDURE — 81001 URINALYSIS AUTO W/SCOPE: CPT

## 2021-07-12 PROCEDURE — 86901 BLOOD TYPING SEROLOGIC RH(D): CPT

## 2021-07-12 PROCEDURE — 85730 THROMBOPLASTIN TIME PARTIAL: CPT

## 2021-07-12 PROCEDURE — 86850 RBC ANTIBODY SCREEN: CPT

## 2021-07-12 PROCEDURE — 6370000000 HC RX 637 (ALT 250 FOR IP): Performed by: EMERGENCY MEDICINE

## 2021-07-12 RX ORDER — DOCUSATE SODIUM 100 MG/1
100 CAPSULE, LIQUID FILLED ORAL 2 TIMES DAILY
Status: DISCONTINUED | OUTPATIENT
Start: 2021-07-12 | End: 2021-07-16 | Stop reason: HOSPADM

## 2021-07-12 RX ORDER — GLIMEPIRIDE 1 MG/1
1 TABLET ORAL
COMMUNITY

## 2021-07-12 RX ORDER — SODIUM CHLORIDE 9 MG/ML
1000 INJECTION, SOLUTION INTRAVENOUS CONTINUOUS
Status: DISCONTINUED | OUTPATIENT
Start: 2021-07-12 | End: 2021-07-13

## 2021-07-12 RX ORDER — LEVOTHYROXINE SODIUM 0.07 MG/1
75 TABLET ORAL DAILY
COMMUNITY

## 2021-07-12 RX ORDER — POLYETHYLENE GLYCOL 3350 17 G/17G
17 POWDER, FOR SOLUTION ORAL DAILY PRN
Status: DISCONTINUED | OUTPATIENT
Start: 2021-07-12 | End: 2021-07-16 | Stop reason: HOSPADM

## 2021-07-12 RX ORDER — PANTOPRAZOLE SODIUM 40 MG/1
40 TABLET, DELAYED RELEASE ORAL
Status: DISCONTINUED | OUTPATIENT
Start: 2021-07-13 | End: 2021-07-16 | Stop reason: HOSPADM

## 2021-07-12 RX ORDER — GLIPIZIDE 5 MG/1
2.5 TABLET ORAL
Status: DISCONTINUED | OUTPATIENT
Start: 2021-07-13 | End: 2021-07-16 | Stop reason: HOSPADM

## 2021-07-12 RX ORDER — ONDANSETRON 4 MG/1
4 TABLET, ORALLY DISINTEGRATING ORAL EVERY 8 HOURS PRN
Status: DISCONTINUED | OUTPATIENT
Start: 2021-07-12 | End: 2021-07-16 | Stop reason: HOSPADM

## 2021-07-12 RX ORDER — SODIUM CHLORIDE 0.9 % (FLUSH) 0.9 %
5-40 SYRINGE (ML) INJECTION EVERY 12 HOURS SCHEDULED
Status: DISCONTINUED | OUTPATIENT
Start: 2021-07-12 | End: 2021-07-16 | Stop reason: HOSPADM

## 2021-07-12 RX ORDER — SODIUM CHLORIDE 9 MG/ML
25 INJECTION, SOLUTION INTRAVENOUS PRN
Status: DISCONTINUED | OUTPATIENT
Start: 2021-07-12 | End: 2021-07-16 | Stop reason: HOSPADM

## 2021-07-12 RX ORDER — SODIUM CHLORIDE 0.9 % (FLUSH) 0.9 %
5-40 SYRINGE (ML) INJECTION PRN
Status: DISCONTINUED | OUTPATIENT
Start: 2021-07-12 | End: 2021-07-16 | Stop reason: HOSPADM

## 2021-07-12 RX ORDER — SODIUM CHLORIDE 9 MG/ML
INJECTION, SOLUTION INTRAVENOUS CONTINUOUS
Status: DISCONTINUED | OUTPATIENT
Start: 2021-07-12 | End: 2021-07-16 | Stop reason: HOSPADM

## 2021-07-12 RX ORDER — ASPIRIN 81 MG/1
324 TABLET, CHEWABLE ORAL ONCE
Status: COMPLETED | OUTPATIENT
Start: 2021-07-12 | End: 2021-07-12

## 2021-07-12 RX ORDER — ACETAMINOPHEN 650 MG/1
650 SUPPOSITORY RECTAL EVERY 6 HOURS PRN
Status: DISCONTINUED | OUTPATIENT
Start: 2021-07-12 | End: 2021-07-16 | Stop reason: HOSPADM

## 2021-07-12 RX ORDER — ACETAMINOPHEN 325 MG/1
650 TABLET ORAL EVERY 6 HOURS PRN
Status: DISCONTINUED | OUTPATIENT
Start: 2021-07-12 | End: 2021-07-16 | Stop reason: HOSPADM

## 2021-07-12 RX ORDER — LEVOTHYROXINE SODIUM 0.07 MG/1
75 TABLET ORAL DAILY
Status: DISCONTINUED | OUTPATIENT
Start: 2021-07-13 | End: 2021-07-16 | Stop reason: HOSPADM

## 2021-07-12 RX ORDER — HYDROCODONE BITARTRATE AND ACETAMINOPHEN 5; 325 MG/1; MG/1
1 TABLET ORAL EVERY 4 HOURS PRN
Status: DISCONTINUED | OUTPATIENT
Start: 2021-07-12 | End: 2021-07-16 | Stop reason: HOSPADM

## 2021-07-12 RX ORDER — POLYETHYLENE GLYCOL 3350 17 G/17G
17 POWDER, FOR SOLUTION ORAL DAILY
Status: DISCONTINUED | OUTPATIENT
Start: 2021-07-12 | End: 2021-07-16 | Stop reason: HOSPADM

## 2021-07-12 RX ORDER — ONDANSETRON 2 MG/ML
4 INJECTION INTRAMUSCULAR; INTRAVENOUS EVERY 6 HOURS PRN
Status: DISCONTINUED | OUTPATIENT
Start: 2021-07-12 | End: 2021-07-16 | Stop reason: HOSPADM

## 2021-07-12 RX ADMIN — SODIUM CHLORIDE 1000 ML: 9 INJECTION, SOLUTION INTRAVENOUS at 12:27

## 2021-07-12 RX ADMIN — POLYETHYLENE GLYCOL (3350) 17 G: 17 POWDER, FOR SOLUTION ORAL at 20:17

## 2021-07-12 RX ADMIN — ASPIRIN 324 MG: 81 TABLET, CHEWABLE ORAL at 12:26

## 2021-07-12 ASSESSMENT — ENCOUNTER SYMPTOMS
SHORTNESS OF BREATH: 1
DIARRHEA: 0
NAUSEA: 0
ABDOMINAL PAIN: 0
RHINORRHEA: 0
CONSTIPATION: 1
BLOOD IN STOOL: 1

## 2021-07-12 ASSESSMENT — PAIN SCALES - GENERAL: PAINLEVEL_OUTOF10: 0
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
## 2021-07-12 NOTE — ED NOTES
Bedside report given to [facility] and care transferred     Hubert Dunlap RN  07/12/21 1936

## 2021-07-12 NOTE — ED NOTES
Dr. Jordi Ashton at bedside to do rectal exam; this nurse at bedside as chaperone. [facility]  07/12/21 1127

## 2021-07-13 LAB
ANION GAP SERPL CALCULATED.3IONS-SCNC: 13 MMOL/L (ref 4–16)
BASOPHILS ABSOLUTE: 0.1 K/CU MM
BASOPHILS RELATIVE PERCENT: 1.8 % (ref 0–1)
BUN BLDV-MCNC: 10 MG/DL (ref 6–23)
CALCIUM SERPL-MCNC: 8.8 MG/DL (ref 8.3–10.6)
CHLORIDE BLD-SCNC: 91 MMOL/L (ref 99–110)
CO2: 20 MMOL/L (ref 21–32)
CREAT SERPL-MCNC: 0.8 MG/DL (ref 0.6–1.1)
DIFFERENTIAL TYPE: ABNORMAL
EOSINOPHILS ABSOLUTE: 0.2 K/CU MM
EOSINOPHILS RELATIVE PERCENT: 4.6 % (ref 0–3)
GFR AFRICAN AMERICAN: >60 ML/MIN/1.73M2
GFR NON-AFRICAN AMERICAN: >60 ML/MIN/1.73M2
GLUCOSE BLD-MCNC: 101 MG/DL (ref 70–99)
GLUCOSE BLD-MCNC: 106 MG/DL (ref 70–99)
GLUCOSE BLD-MCNC: 57 MG/DL (ref 70–99)
GLUCOSE BLD-MCNC: 86 MG/DL (ref 70–99)
HCT VFR BLD CALC: 30.9 % (ref 37–47)
HCT VFR BLD CALC: 31.5 % (ref 37–47)
HCT VFR BLD CALC: 32.6 % (ref 37–47)
HEMOGLOBIN: 10.5 GM/DL (ref 12.5–16)
HEMOGLOBIN: 10.8 GM/DL (ref 12.5–16)
HEMOGLOBIN: 11 GM/DL (ref 12.5–16)
IMMATURE NEUTROPHIL %: 0.2 % (ref 0–0.43)
LYMPHOCYTES ABSOLUTE: 1.2 K/CU MM
LYMPHOCYTES RELATIVE PERCENT: 23.3 % (ref 24–44)
MCH RBC QN AUTO: 29 PG (ref 27–31)
MCHC RBC AUTO-ENTMCNC: 34 % (ref 32–36)
MCV RBC AUTO: 85.4 FL (ref 78–100)
MONOCYTES ABSOLUTE: 0.5 K/CU MM
MONOCYTES RELATIVE PERCENT: 10.6 % (ref 0–4)
NUCLEATED RBC %: 0 %
PDW BLD-RTO: 13.5 % (ref 11.7–14.9)
PLATELET # BLD: 424 K/CU MM (ref 140–440)
PMV BLD AUTO: 8.2 FL (ref 7.5–11.1)
POTASSIUM SERPL-SCNC: 4.4 MMOL/L (ref 3.5–5.1)
RBC # BLD: 3.62 M/CU MM (ref 4.2–5.4)
SEGMENTED NEUTROPHILS ABSOLUTE COUNT: 3 K/CU MM
SEGMENTED NEUTROPHILS RELATIVE PERCENT: 59.5 % (ref 36–66)
SODIUM BLD-SCNC: 124 MMOL/L (ref 135–145)
T4 FREE: 1.95 NG/DL (ref 0.9–1.8)
TOTAL IMMATURE NEUTOROPHIL: 0.01 K/CU MM
TOTAL NUCLEATED RBC: 0 K/CU MM
TROPONIN T: 0.03 NG/ML
TSH HIGH SENSITIVITY: 3.85 UIU/ML (ref 0.27–4.2)
WBC # BLD: 5 K/CU MM (ref 4–10.5)

## 2021-07-13 PROCEDURE — 84443 ASSAY THYROID STIM HORMONE: CPT

## 2021-07-13 PROCEDURE — 84484 ASSAY OF TROPONIN QUANT: CPT

## 2021-07-13 PROCEDURE — 85025 COMPLETE CBC W/AUTO DIFF WBC: CPT

## 2021-07-13 PROCEDURE — 6370000000 HC RX 637 (ALT 250 FOR IP): Performed by: SPECIALIST

## 2021-07-13 PROCEDURE — 84439 ASSAY OF FREE THYROXINE: CPT

## 2021-07-13 PROCEDURE — 94761 N-INVAS EAR/PLS OXIMETRY MLT: CPT

## 2021-07-13 PROCEDURE — 6370000000 HC RX 637 (ALT 250 FOR IP): Performed by: INTERNAL MEDICINE

## 2021-07-13 PROCEDURE — 1200000000 HC SEMI PRIVATE

## 2021-07-13 PROCEDURE — 36415 COLL VENOUS BLD VENIPUNCTURE: CPT

## 2021-07-13 PROCEDURE — 2580000003 HC RX 258: Performed by: INTERNAL MEDICINE

## 2021-07-13 PROCEDURE — 82962 GLUCOSE BLOOD TEST: CPT

## 2021-07-13 PROCEDURE — 80048 BASIC METABOLIC PNL TOTAL CA: CPT

## 2021-07-13 PROCEDURE — 85018 HEMOGLOBIN: CPT

## 2021-07-13 PROCEDURE — 85014 HEMATOCRIT: CPT

## 2021-07-13 RX ORDER — CLONIDINE HYDROCHLORIDE 0.1 MG/1
0.1 TABLET ORAL EVERY 4 HOURS PRN
Status: DISCONTINUED | OUTPATIENT
Start: 2021-07-13 | End: 2021-07-16 | Stop reason: HOSPADM

## 2021-07-13 RX ORDER — SODIUM PHOSPHATE, DIBASIC AND SODIUM PHOSPHATE, MONOBASIC 7; 19 G/133ML; G/133ML
1 ENEMA RECTAL
Status: DISPENSED | OUTPATIENT
Start: 2021-07-13 | End: 2021-07-13

## 2021-07-13 RX ORDER — NICOTINE POLACRILEX 4 MG
15 LOZENGE BUCCAL PRN
Status: DISCONTINUED | OUTPATIENT
Start: 2021-07-13 | End: 2021-07-16 | Stop reason: HOSPADM

## 2021-07-13 RX ORDER — ZOLPIDEM TARTRATE 5 MG/1
5 TABLET ORAL NIGHTLY PRN
Status: DISCONTINUED | OUTPATIENT
Start: 2021-07-13 | End: 2021-07-16 | Stop reason: HOSPADM

## 2021-07-13 RX ORDER — DEXTROSE MONOHYDRATE 25 G/50ML
12.5 INJECTION, SOLUTION INTRAVENOUS PRN
Status: DISCONTINUED | OUTPATIENT
Start: 2021-07-13 | End: 2021-07-16 | Stop reason: HOSPADM

## 2021-07-13 RX ORDER — DEXTROSE MONOHYDRATE 50 MG/ML
100 INJECTION, SOLUTION INTRAVENOUS PRN
Status: DISCONTINUED | OUTPATIENT
Start: 2021-07-13 | End: 2021-07-16 | Stop reason: HOSPADM

## 2021-07-13 RX ADMIN — POLYETHYLENE GLYCOL (3350) 17 G: 17 POWDER, FOR SOLUTION ORAL at 07:49

## 2021-07-13 RX ADMIN — PANTOPRAZOLE SODIUM 40 MG: 40 TABLET, DELAYED RELEASE ORAL at 17:04

## 2021-07-13 RX ADMIN — LEVOTHYROXINE SODIUM 75 MCG: 0.07 TABLET ORAL at 05:20

## 2021-07-13 RX ADMIN — SODIUM CHLORIDE, PRESERVATIVE FREE 10 ML: 5 INJECTION INTRAVENOUS at 21:00

## 2021-07-13 RX ADMIN — DOCUSATE SODIUM 100 MG: 100 CAPSULE ORAL at 07:49

## 2021-07-13 RX ADMIN — MAGNESIUM CITRATE 296 ML: 1.75 LIQUID ORAL at 14:11

## 2021-07-13 RX ADMIN — ZOLPIDEM TARTRATE 5 MG: 5 TABLET ORAL at 22:57

## 2021-07-13 RX ADMIN — PANTOPRAZOLE SODIUM 40 MG: 40 TABLET, DELAYED RELEASE ORAL at 05:20

## 2021-07-13 RX ADMIN — GLIPIZIDE 2.5 MG: 5 TABLET ORAL at 05:20

## 2021-07-13 RX ADMIN — HYDROCODONE BITARTRATE AND ACETAMINOPHEN 1 TABLET: 5; 325 TABLET ORAL at 00:10

## 2021-07-13 RX ADMIN — DOCUSATE SODIUM 100 MG: 100 CAPSULE ORAL at 21:00

## 2021-07-13 ASSESSMENT — PAIN SCALES - GENERAL
PAINLEVEL_OUTOF10: 0
PAINLEVEL_OUTOF10: 0
PAINLEVEL_OUTOF10: 10

## 2021-07-13 NOTE — PROGRESS NOTES
INTERNAL MEDICINE PROGRESS NOTE        Jayna Dmoinguez   1945   Primary Care Physician:  Stuart Marc MD  Admit Date: 7/12/2021     Subjective:   Pt is doing better today. Denies chest pain, SOB, nausea, vomiting, abdominal pain. Remainder of ROS is unremarkable. Meds, labs and other notes reviewed. Objective:   BP (!) 164/66   Pulse 66   Temp 97.6 °F (36.4 °C) (Oral)   Resp 18   Ht 5' 4\" (1.626 m)   Wt 160 lb (72.6 kg)   SpO2 99%   BMI 27.46 kg/m²  No results for input(s): POCGLU in the last 72 hours. I/O last 3 completed shifts: In: 400 [P.O.:400]  Out: -   No intake/output data recorded. Neck: no adenopathy and supple, symmetrical, trachea midline  Lungs: clear to auscultation bilaterally  Heart: regular rate and rhythm and S1, S2 normal  Abdomen: soft, non-tender; bowel sounds normal; no masses,  no organomegaly  Extremities: extremities normal, atraumatic, no cyanosis or edema  Neurologic: Grossly normal    Data Review  CBC with Differential:    Recent Labs     07/12/21  1132 07/12/21  1132 07/12/21  2219 07/13/21  0229 07/13/21  0709   WBC 5.0  --   --   --  5.0   RBC 3.92*  --   --   --  3.62*   HGB 11.5*   < > 10.8* 10.8* 10.5*   HCT 33.0*   < > 32.2* 31.5* 30.9*     --   --   --  424   MCV 84.2  --   --   --  85.4   MCH 29.3  --   --   --  29.0   MCHC 34.8  --   --   --  34.0   RDW 13.7  --   --   --  13.5   SEGSPCT 77.4*  --   --   --  59.5   LYMPHOPCT 13.0*  --   --   --  23.3*   MONOPCT 6.8*  --   --   --  10.6*   BASOPCT 1.0  --   --   --  1.8*   MONOSABS 0.3  --   --   --  0.5   LYMPHSABS 0.7  --   --   --  1.2   EOSABS 0.1  --   --   --  0.2   BASOSABS 0.1  --   --   --  0.1   DIFFTYPE AUTOMATED DIFFERENTIAL  --   --   --  AUTOMATED DIFFERENTIAL    < > = values in this interval not displayed.      CMP:    Recent Labs     07/12/21  1132 07/13/21  0709   * 124*   K 4.9 4.4   CL 85* 91*   CO2 21 20*   BUN 9 10   CREATININE 0.8 0.8   GFRAA >60 >60   LABGLOM >60 >60 GLUCOSE 133* 57*   PROT 7.0  --    LABALBU 4.3  --    CALCIUM 9.1 8.8   BILITOT 0.4  --    ALKPHOS 89  --    AST 13*  --    ALT 11  --      PT/INR:    Recent Labs     07/12/21  1132   PROTIME 15.9*   INR 1.23     Meds:    insulin lispro  0-6 Units Subcutaneous TID WC    insulin lispro  0-3 Units Subcutaneous Nightly    sodium chloride flush  5-40 mL Intravenous 2 times per day    docusate sodium  100 mg Oral BID    polyethylene glycol  17 g Oral Daily    glipiZIDE  2.5 mg Oral QAM AC    levothyroxine  75 mcg Oral Daily    pantoprazole  40 mg Oral BID AC     PRN Meds: cloNIDine, glucose, dextrose, glucagon (rDNA), dextrose, sodium chloride flush, sodium chloride, ondansetron **OR** ondansetron, polyethylene glycol, acetaminophen **OR** acetaminophen, HYDROcodone-acetaminophen    Assessment/Plan:   1. Rectal bleeding - Patient's hemoglobin is remaining in the range of 10.5-11.5. Suspicion of rectal bleeding is likely secondary to hemorrhoids. Dr. Kait Fraire has been consulted. Awaiting recommendations from gastroenterology. 2.  Hyponatremia - Improving. Sodium was 124 this morning. We will continue fluids and recheck in the morning. 3.  Constipation - Will continue with docusate sodium and polyethylene glycol. 4.  Hypertension - Blood pressure noted to be elevated. Ordered clonidine 0.1 mg - 1 tablet p.o. every 4 hours as needed for systolic blood pressure greater than 164 and diastolic blood pressure greater than 95.  5.  Paroxysmal atrial fibrillation. 6.  Diabetes mellitus - Will continue point-of-care glucose checks and sliding scale insulin coverage in addition to patient's glipizide. 7.  Hyperlipidemia  8. Hypothyroidism - We will continue levothyroxine. 9.  Lawler's esophagus.      This patient was examined and treated by Isaac Carver PA-C under the supervision of Dr. Rajat Curry MD.     I spent at least 40 minutes reviewing pt's record, previous notes, other providers' notes, labs, diagnostic imaging, and documenting in Epic in addition to face-to-face time I spent with the pt.      Vitor Veras PA-C  7/13/2021 9:14 AM

## 2021-07-13 NOTE — PROGRESS NOTES
Pt admitting BP was 176/80. Pt also currently not on telemetry. Yoana Ashley notified. Waiting on response.

## 2021-07-13 NOTE — CONSULTS
have diverticulosis  coli, hemorrhoids, and a small amount of stool was present. The patient  also has had at least three EGDs done and the last EGD was on 05/14/2020  and the patient was noted to have a large hiatal hernia with a 1 cm  segment of Lawler's esophagus and a 2-cm gastric ulcer was noted. Biopsies and brushings were obtained, which were negative for  malignancy. Also, linear erosions were noted in the hiatal hernia sac  reminiscent of Roni lesions. The patient also had a small bowel  follow-through done on 06/20/2016, which was unremarkable. REVIEW OF SYSTEMS:  CENTRAL NERVOUS SYSTEM:  The patient denies headache or focal  sensorimotor symptoms. CARDIOVASCULAR SYSTEM:  No history of chest pain, shortness of breath or  leg swelling. GENITOURINARY SYSTEM:  No history of dysuria, pyuria or hematuria. MUSCULOSKELETAL SYSTEM:  The patient complains of generalized weakness. RESPIRATORY SYSTEM:  No history of cough, hemoptysis, fever or chills. PAST MEDICAL HISTORY:  Significant for history of hypertension, diabetes  mellitus, hyperlipidemia, carcinoma of the left breast status post  lumpectomy in 2009 followed by radiation therapy, also history of colon  polyps, Lawler's esophagus, gastric ulcer, and anemia requiring blood  transfusions. FAMILY HISTORY:  The patient's sister was diagnosed with carcinoma of  the pancreas. MEDICATIONS:  Please refer to the chart (the patient was on Eliquis  prior to admission). SOCIOECONOMIC HISTORY:  No history of EtOH abuse. The patient does not  smoke cigarettes. PAST SURGICAL HISTORY:  The patient has had tubal ligation done, left  lumpectomy for carcinoma of the breast by Dr. Naveen Ortiz in 09/2009,  tonsillectomy, and cholecystectomy on 09/28/2019. ALLERGIES:  The patient has SEASONAL ALLERGIES.     PHYSICAL EXAMINATION:  GENERAL:  Shows a 72-year white female of average build and nutritional  status, who is lying comfortably flat in bed, no acute distress. She is  awake, alert, and oriented, and pleasant to talk with. VITAL SIGNS:  Stable. HEENT:  Shows skull to be atraumatic. NECK:  Supple. CHEST:  Clear. HEART:  S1, S2 are normal.  ABDOMEN:  Soft, nontender, and nondistended. Liver and spleen are not  palpable. Bowel sounds are present. RECTAL:  Exam is deferred. CNS:  Shows the patient to be awake, alert, and oriented. There are no  focal sensorimotor signs. MUSCULOSKELETAL SYSTEM:  Shows evidence of degenerative joint disease  changes. LABORATORY DATA:  As above mentioned. IMPRESSION:  A 71-year-old white female with;  1.  History of colon polyps, Lawler's esophagus, and anemia requiring  blood transfusion and gastric ulcer, presents with 7-day history of  constipation with straining and small amount of bright red blood per  rectum, rule out lower GI bleeding, most likely secondary to  hemorrhoids. 2.  History of constipation. 3.  History of short segment Lawler's esophagus. 4.  History of gastric ulcer. RECOMMENDATIONS:  1. Agree with present management with Protonix. 2.  We will give the patient one bottle of mag citrate to get her bowels  moving. 3.  Monitor the patient's CBC and Chem profile. 4.  The patient has been instructed to have a repeat colonoscopy once  her colon is cleaned out. 6.  The case and plan has been discussed in detail with the patient. 7.  The patient will need a followup EGD also for her gastric ulcer;  noted during the last EGD on 05/14/2020.         Carmita Vieira MD    D: 07/13/2021 13:56:07       T: 07/13/2021 14:00:59     [email or web address]  Job#: [id number]     Doc#: [id number]    CC:  Rosalie Pace MD

## 2021-07-13 NOTE — CARE COORDINATION
CM into see pt to initiate a safe discharge plan. Cm  introduced self and explained role of CM. Pt is kind, alert and oriented. Pt lives with her brother, JOSÉ MIGUEL and grandson. They live in a two story home. Pt stated that she is well taken care of. Pt has chair stair lift. Pt DME includes a walker, cane, shower josé luis w/c. Pt has meals prepared, assisted with ADL's. Pt has a PCP. Pt has insurance and is able to afford her medications. CM discussed discharge planning. Pt desires to return home. CM offered home care and pt declines. Discharge plan is for pt to return home with her brother and very well supported by family. Declines home care. No needs LH  CM provided card and encouraged to call for any needs or concern. CM is available if any needs arise.

## 2021-07-14 LAB
ALBUMIN SERPL-MCNC: 3.8 GM/DL (ref 3.4–5)
ALP BLD-CCNC: 74 IU/L (ref 40–129)
ALT SERPL-CCNC: 10 U/L (ref 10–40)
AMYLASE: 84 U/L (ref 25–115)
ANION GAP SERPL CALCULATED.3IONS-SCNC: 12 MMOL/L (ref 4–16)
APTT: 29.1 SECONDS (ref 25.1–37.1)
AST SERPL-CCNC: 11 IU/L (ref 15–37)
BASOPHILS ABSOLUTE: 0.1 K/CU MM
BASOPHILS RELATIVE PERCENT: 1.5 % (ref 0–1)
BILIRUB SERPL-MCNC: 0.2 MG/DL (ref 0–1)
BUN BLDV-MCNC: 13 MG/DL (ref 6–23)
CALCIUM SERPL-MCNC: 8.8 MG/DL (ref 8.3–10.6)
CHLORIDE BLD-SCNC: 91 MMOL/L (ref 99–110)
CO2: 22 MMOL/L (ref 21–32)
CREAT SERPL-MCNC: 0.8 MG/DL (ref 0.6–1.1)
DIFFERENTIAL TYPE: ABNORMAL
EKG ATRIAL RATE: 74 BPM
EKG DIAGNOSIS: NORMAL
EKG P AXIS: 63 DEGREES
EKG P-R INTERVAL: 156 MS
EKG Q-T INTERVAL: 364 MS
EKG QRS DURATION: 72 MS
EKG QTC CALCULATION (BAZETT): 404 MS
EKG R AXIS: -24 DEGREES
EKG T AXIS: 42 DEGREES
EKG VENTRICULAR RATE: 74 BPM
EOSINOPHILS ABSOLUTE: 0.3 K/CU MM
EOSINOPHILS RELATIVE PERCENT: 4.9 % (ref 0–3)
GFR AFRICAN AMERICAN: >60 ML/MIN/1.73M2
GFR NON-AFRICAN AMERICAN: >60 ML/MIN/1.73M2
GLUCOSE BLD-MCNC: 110 MG/DL (ref 70–99)
GLUCOSE BLD-MCNC: 119 MG/DL (ref 70–99)
GLUCOSE BLD-MCNC: 59 MG/DL (ref 70–99)
GLUCOSE BLD-MCNC: 73 MG/DL (ref 70–99)
GLUCOSE BLD-MCNC: 93 MG/DL (ref 70–99)
HCT VFR BLD CALC: 32.1 % (ref 37–47)
HEMOGLOBIN: 10 GM/DL (ref 12.5–16)
IMMATURE NEUTROPHIL %: 0.2 % (ref 0–0.43)
INR BLD: 1.02 INDEX
LIPASE: 45 IU/L (ref 13–60)
LYMPHOCYTES ABSOLUTE: 1 K/CU MM
LYMPHOCYTES RELATIVE PERCENT: 17 % (ref 24–44)
MCH RBC QN AUTO: 28.4 PG (ref 27–31)
MCHC RBC AUTO-ENTMCNC: 31.2 % (ref 32–36)
MCV RBC AUTO: 91.2 FL (ref 78–100)
MONOCYTES ABSOLUTE: 0.4 K/CU MM
MONOCYTES RELATIVE PERCENT: 7.1 % (ref 0–4)
NUCLEATED RBC %: 0 %
PDW BLD-RTO: 14.1 % (ref 11.7–14.9)
PLATELET # BLD: 408 K/CU MM (ref 140–440)
PMV BLD AUTO: 8.3 FL (ref 7.5–11.1)
POTASSIUM SERPL-SCNC: 5 MMOL/L (ref 3.5–5.1)
PROTHROMBIN TIME: 13.2 SECONDS (ref 11.7–14.5)
RBC # BLD: 3.52 M/CU MM (ref 4.2–5.4)
SARS-COV-2, NAAT: NOT DETECTED
SEGMENTED NEUTROPHILS ABSOLUTE COUNT: 4.1 K/CU MM
SEGMENTED NEUTROPHILS RELATIVE PERCENT: 69.3 % (ref 36–66)
SODIUM BLD-SCNC: 125 MMOL/L (ref 135–145)
SOURCE: NORMAL
TOTAL IMMATURE NEUTOROPHIL: 0.01 K/CU MM
TOTAL NUCLEATED RBC: 0 K/CU MM
TOTAL PROTEIN: 5.7 GM/DL (ref 6.4–8.2)
WBC # BLD: 5.9 K/CU MM (ref 4–10.5)

## 2021-07-14 PROCEDURE — 85014 HEMATOCRIT: CPT

## 2021-07-14 PROCEDURE — 6370000000 HC RX 637 (ALT 250 FOR IP): Performed by: SPECIALIST

## 2021-07-14 PROCEDURE — 85018 HEMOGLOBIN: CPT

## 2021-07-14 PROCEDURE — 80053 COMPREHEN METABOLIC PANEL: CPT

## 2021-07-14 PROCEDURE — 36415 COLL VENOUS BLD VENIPUNCTURE: CPT

## 2021-07-14 PROCEDURE — 94761 N-INVAS EAR/PLS OXIMETRY MLT: CPT

## 2021-07-14 PROCEDURE — 93010 ELECTROCARDIOGRAM REPORT: CPT | Performed by: INTERNAL MEDICINE

## 2021-07-14 PROCEDURE — 2580000003 HC RX 258: Performed by: INTERNAL MEDICINE

## 2021-07-14 PROCEDURE — 1200000000 HC SEMI PRIVATE

## 2021-07-14 PROCEDURE — 82150 ASSAY OF AMYLASE: CPT

## 2021-07-14 PROCEDURE — 85610 PROTHROMBIN TIME: CPT

## 2021-07-14 PROCEDURE — 76937 US GUIDE VASCULAR ACCESS: CPT

## 2021-07-14 PROCEDURE — 6370000000 HC RX 637 (ALT 250 FOR IP): Performed by: INTERNAL MEDICINE

## 2021-07-14 PROCEDURE — 85730 THROMBOPLASTIN TIME PARTIAL: CPT

## 2021-07-14 PROCEDURE — 87635 SARS-COV-2 COVID-19 AMP PRB: CPT

## 2021-07-14 PROCEDURE — 85025 COMPLETE CBC W/AUTO DIFF WBC: CPT

## 2021-07-14 PROCEDURE — 83690 ASSAY OF LIPASE: CPT

## 2021-07-14 PROCEDURE — 82962 GLUCOSE BLOOD TEST: CPT

## 2021-07-14 RX ADMIN — PANTOPRAZOLE SODIUM 40 MG: 40 TABLET, DELAYED RELEASE ORAL at 05:16

## 2021-07-14 RX ADMIN — SODIUM CHLORIDE, PRESERVATIVE FREE 10 ML: 5 INJECTION INTRAVENOUS at 07:46

## 2021-07-14 RX ADMIN — POLYETHYLENE GLYCOL 3350, SODIUM SULFATE ANHYDROUS, SODIUM BICARBONATE, SODIUM CHLORIDE, POTASSIUM CHLORIDE 4000 ML: 236; 22.74; 6.74; 5.86; 2.97 POWDER, FOR SOLUTION ORAL at 17:17

## 2021-07-14 RX ADMIN — LEVOTHYROXINE SODIUM 75 MCG: 0.07 TABLET ORAL at 05:17

## 2021-07-14 RX ADMIN — SODIUM CHLORIDE: 9 INJECTION, SOLUTION INTRAVENOUS at 07:46

## 2021-07-14 RX ADMIN — GLIPIZIDE 2.5 MG: 5 TABLET ORAL at 05:16

## 2021-07-14 NOTE — PROGRESS NOTES
DOING WELL HAD MULTIPLE BMS WITH SMALL AMOUNT OF BLOOD NO ABD COMPLAINTS   VITALS STABLE   LABS NOTED   WILL  PERFORM COLONOSCOPY IN AM AND ALSO DO F/U EGD FOR

## 2021-07-14 NOTE — PROGRESS NOTES
INTERNAL MEDICINE PROGRESS NOTE        Jodi Dsouza   1945   Primary Care Physician:  Shaunna Can MD  Admit Date: 7/12/2021     Subjective:   Pt is doing better today. Denies chest pain, SOB, nausea, vomiting, abdominal pain. Remainder of ROS is unremarkable. Meds, labs and other notes reviewed. Pt did receive enema and mag citrate yesterday. Multiple bm's. Pt reports 1st bm had slight brb likely from hemorrhoids otherwise no bloody stools. She still reports generalized weakness that is unchanged. Objective:   /72   Pulse 65   Temp 97.6 °F (36.4 °C) (Oral)   Resp 16   Ht 5' 4\" (1.626 m)   Wt 202 lb 12.8 oz (92 kg)   SpO2 98%   BMI 34.81 kg/m²    Recent Labs     07/13/21  1218 07/13/21  1706 07/13/21 2005 07/14/21  0741   POCGLU 86 101* 106* 59*       I/O last 3 completed shifts: In: 620 [P.O.:620]  Out: -   No intake/output data recorded.     Neck: no adenopathy and supple, symmetrical, trachea midline  Lungs: clear to auscultation bilaterally  Heart: regular rate and rhythm and S1, S2 normal  Abdomen: soft, non-tender; bowel sounds normal; no masses,  no organomegaly  Extremities: extremities normal, atraumatic, no cyanosis or edema  Neurologic: Grossly normal    Data Review  CBC with Differential:    Recent Labs     07/12/21  1132 07/12/21  2219 07/13/21  0709 07/13/21 2019 07/14/21  0211   WBC 5.0  --  5.0  --  5.9   RBC 3.92*  --  3.62*  --  3.52*   HGB 11.5*   < > 10.5* 11.0* 10.0*   HCT 33.0*   < > 30.9* 32.6* 32.1*     --  424  --  408   MCV 84.2  --  85.4  --  91.2   MCH 29.3  --  29.0  --  28.4   MCHC 34.8  --  34.0  --  31.2*   RDW 13.7  --  13.5  --  14.1   SEGSPCT 77.4*  --  59.5  --  69.3*   LYMPHOPCT 13.0*  --  23.3*  --  17.0*   MONOPCT 6.8*  --  10.6*  --  7.1*   BASOPCT 1.0  --  1.8*  --  1.5*   MONOSABS 0.3  --  0.5  --  0.4   LYMPHSABS 0.7  --  1.2  --  1.0   EOSABS 0.1  --  0.2  --  0.3   BASOSABS 0.1  --  0.1  --  0.1   DIFFTYPE AUTOMATED DIFFERENTIAL  -- to patient's glipizide. 7.  Hyperlipidemia  8. Hypothyroidism - We will continue levothyroxine. 9.  Lawler's esophagus. Marcelina Longo, JOURDAN  7/14/2021 8:22 AM     Pt seen and examined. Labs, imaging, and noted reviewed. Agree with above.      Kolby Machado MD.

## 2021-07-14 NOTE — FLOWSHEET NOTE
07/14/21 1053   Encounter Summary   Services provided to: Significant other   Referral/Consult From: Rounding   Continue Visiting Yes   Routine   Assessment Unable to respond

## 2021-07-15 ENCOUNTER — ANESTHESIA (OUTPATIENT)
Dept: ENDOSCOPY | Age: 76
DRG: 394 | End: 2021-07-15
Payer: MEDICARE

## 2021-07-15 ENCOUNTER — ANESTHESIA EVENT (OUTPATIENT)
Dept: ENDOSCOPY | Age: 76
DRG: 394 | End: 2021-07-15
Payer: MEDICARE

## 2021-07-15 VITALS — OXYGEN SATURATION: 100 % | DIASTOLIC BLOOD PRESSURE: 84 MMHG | SYSTOLIC BLOOD PRESSURE: 150 MMHG

## 2021-07-15 LAB
ALBUMIN SERPL-MCNC: 3.6 GM/DL (ref 3.4–5)
ALP BLD-CCNC: 77 IU/L (ref 40–128)
ALT SERPL-CCNC: 11 U/L (ref 10–40)
ANION GAP SERPL CALCULATED.3IONS-SCNC: 10 MMOL/L (ref 4–16)
APTT: 30 SECONDS (ref 25.1–37.1)
AST SERPL-CCNC: 14 IU/L (ref 15–37)
BASOPHILS ABSOLUTE: 0.1 K/CU MM
BASOPHILS RELATIVE PERCENT: 1.4 % (ref 0–1)
BILIRUB SERPL-MCNC: 0.3 MG/DL (ref 0–1)
BUN BLDV-MCNC: 7 MG/DL (ref 6–23)
CALCIUM SERPL-MCNC: 8.8 MG/DL (ref 8.3–10.6)
CHLORIDE BLD-SCNC: 98 MMOL/L (ref 99–110)
CO2: 22 MMOL/L (ref 21–32)
CREAT SERPL-MCNC: 0.6 MG/DL (ref 0.6–1.1)
DIFFERENTIAL TYPE: ABNORMAL
EOSINOPHILS ABSOLUTE: 0.5 K/CU MM
EOSINOPHILS RELATIVE PERCENT: 9.6 % (ref 0–3)
GFR AFRICAN AMERICAN: >60 ML/MIN/1.73M2
GFR NON-AFRICAN AMERICAN: >60 ML/MIN/1.73M2
GLUCOSE BLD-MCNC: 100 MG/DL (ref 70–99)
GLUCOSE BLD-MCNC: 144 MG/DL (ref 70–99)
GLUCOSE BLD-MCNC: 96 MG/DL (ref 70–99)
GLUCOSE BLD-MCNC: 96 MG/DL (ref 70–99)
HCT VFR BLD CALC: 30.3 % (ref 37–47)
HEMOGLOBIN: 10.3 GM/DL (ref 12.5–16)
IMMATURE NEUTROPHIL %: 0.2 % (ref 0–0.43)
INR BLD: 0.9 INDEX
LYMPHOCYTES ABSOLUTE: 0.8 K/CU MM
LYMPHOCYTES RELATIVE PERCENT: 15.8 % (ref 24–44)
MCH RBC QN AUTO: 29.8 PG (ref 27–31)
MCHC RBC AUTO-ENTMCNC: 34 % (ref 32–36)
MCV RBC AUTO: 87.6 FL (ref 78–100)
MONOCYTES ABSOLUTE: 0.4 K/CU MM
MONOCYTES RELATIVE PERCENT: 8 % (ref 0–4)
NUCLEATED RBC %: 0.4 %
PDW BLD-RTO: 14.1 % (ref 11.7–14.9)
PLATELET # BLD: 431 K/CU MM (ref 140–440)
PMV BLD AUTO: 8.6 FL (ref 7.5–11.1)
POTASSIUM SERPL-SCNC: 4.5 MMOL/L (ref 3.5–5.1)
PROTHROMBIN TIME: 11.6 SECONDS (ref 11.7–14.5)
RBC # BLD: 3.46 M/CU MM (ref 4.2–5.4)
SEGMENTED NEUTROPHILS ABSOLUTE COUNT: 3.3 K/CU MM
SEGMENTED NEUTROPHILS RELATIVE PERCENT: 65 % (ref 36–66)
SODIUM BLD-SCNC: 130 MMOL/L (ref 135–145)
TOTAL IMMATURE NEUTOROPHIL: 0.01 K/CU MM
TOTAL NUCLEATED RBC: 0 K/CU MM
TOTAL PROTEIN: 5.9 GM/DL (ref 6.4–8.2)
WBC # BLD: 5 K/CU MM (ref 4–10.5)

## 2021-07-15 PROCEDURE — 3700000001 HC ADD 15 MINUTES (ANESTHESIA): Performed by: SPECIALIST

## 2021-07-15 PROCEDURE — 0DB58ZX EXCISION OF ESOPHAGUS, VIA NATURAL OR ARTIFICIAL OPENING ENDOSCOPIC, DIAGNOSTIC: ICD-10-PCS | Performed by: INTERNAL MEDICINE

## 2021-07-15 PROCEDURE — 3700000000 HC ANESTHESIA ATTENDED CARE: Performed by: SPECIALIST

## 2021-07-15 PROCEDURE — 88305 TISSUE EXAM BY PATHOLOGIST: CPT | Performed by: PATHOLOGY

## 2021-07-15 PROCEDURE — 88342 IMHCHEM/IMCYTCHM 1ST ANTB: CPT | Performed by: PATHOLOGY

## 2021-07-15 PROCEDURE — 80053 COMPREHEN METABOLIC PANEL: CPT

## 2021-07-15 PROCEDURE — 1200000000 HC SEMI PRIVATE

## 2021-07-15 PROCEDURE — 85025 COMPLETE CBC W/AUTO DIFF WBC: CPT

## 2021-07-15 PROCEDURE — 85730 THROMBOPLASTIN TIME PARTIAL: CPT

## 2021-07-15 PROCEDURE — 6360000002 HC RX W HCPCS: Performed by: NURSE ANESTHETIST, CERTIFIED REGISTERED

## 2021-07-15 PROCEDURE — 82962 GLUCOSE BLOOD TEST: CPT

## 2021-07-15 PROCEDURE — 36415 COLL VENOUS BLD VENIPUNCTURE: CPT

## 2021-07-15 PROCEDURE — 2500000003 HC RX 250 WO HCPCS: Performed by: NURSE ANESTHETIST, CERTIFIED REGISTERED

## 2021-07-15 PROCEDURE — 0DBC8ZX EXCISION OF ILEOCECAL VALVE, VIA NATURAL OR ARTIFICIAL OPENING ENDOSCOPIC, DIAGNOSTIC: ICD-10-PCS | Performed by: SPECIALIST

## 2021-07-15 PROCEDURE — 2580000003 HC RX 258: Performed by: ANESTHESIOLOGY

## 2021-07-15 PROCEDURE — 0DBK8ZX EXCISION OF ASCENDING COLON, VIA NATURAL OR ARTIFICIAL OPENING ENDOSCOPIC, DIAGNOSTIC: ICD-10-PCS | Performed by: SPECIALIST

## 2021-07-15 PROCEDURE — 85610 PROTHROMBIN TIME: CPT

## 2021-07-15 PROCEDURE — 3609010600 HC COLONOSCOPY POLYPECTOMY SNARE/COLD BIOPSY: Performed by: SPECIALIST

## 2021-07-15 PROCEDURE — 0DB78ZX EXCISION OF STOMACH, PYLORUS, VIA NATURAL OR ARTIFICIAL OPENING ENDOSCOPIC, DIAGNOSTIC: ICD-10-PCS | Performed by: INTERNAL MEDICINE

## 2021-07-15 PROCEDURE — 6370000000 HC RX 637 (ALT 250 FOR IP): Performed by: INTERNAL MEDICINE

## 2021-07-15 PROCEDURE — 2580000003 HC RX 258: Performed by: INTERNAL MEDICINE

## 2021-07-15 PROCEDURE — 3609012400 HC EGD TRANSORAL BIOPSY SINGLE/MULTIPLE: Performed by: SPECIALIST

## 2021-07-15 PROCEDURE — 0DB68ZX EXCISION OF STOMACH, VIA NATURAL OR ARTIFICIAL OPENING ENDOSCOPIC, DIAGNOSTIC: ICD-10-PCS | Performed by: INTERNAL MEDICINE

## 2021-07-15 PROCEDURE — 2709999900 HC NON-CHARGEABLE SUPPLY: Performed by: SPECIALIST

## 2021-07-15 RX ORDER — PROPOFOL 10 MG/ML
INJECTION, EMULSION INTRAVENOUS PRN
Status: DISCONTINUED | OUTPATIENT
Start: 2021-07-15 | End: 2021-07-15 | Stop reason: SDUPTHER

## 2021-07-15 RX ORDER — SODIUM CHLORIDE, SODIUM LACTATE, POTASSIUM CHLORIDE, CALCIUM CHLORIDE 600; 310; 30; 20 MG/100ML; MG/100ML; MG/100ML; MG/100ML
INJECTION, SOLUTION INTRAVENOUS CONTINUOUS
Status: DISCONTINUED | OUTPATIENT
Start: 2021-07-15 | End: 2021-07-16 | Stop reason: HOSPADM

## 2021-07-15 RX ORDER — LIDOCAINE HYDROCHLORIDE 20 MG/ML
INJECTION, SOLUTION EPIDURAL; INFILTRATION; INTRACAUDAL; PERINEURAL PRN
Status: DISCONTINUED | OUTPATIENT
Start: 2021-07-15 | End: 2021-07-15 | Stop reason: SDUPTHER

## 2021-07-15 RX ADMIN — PROPOFOL 10 MG: 10 INJECTION, EMULSION INTRAVENOUS at 14:50

## 2021-07-15 RX ADMIN — SODIUM CHLORIDE, POTASSIUM CHLORIDE, SODIUM LACTATE AND CALCIUM CHLORIDE: 600; 310; 30; 20 INJECTION, SOLUTION INTRAVENOUS at 14:00

## 2021-07-15 RX ADMIN — SODIUM CHLORIDE: 9 INJECTION, SOLUTION INTRAVENOUS at 11:54

## 2021-07-15 RX ADMIN — PROPOFOL 10 MG: 10 INJECTION, EMULSION INTRAVENOUS at 15:15

## 2021-07-15 RX ADMIN — PROPOFOL 10 MG: 10 INJECTION, EMULSION INTRAVENOUS at 14:54

## 2021-07-15 RX ADMIN — PROPOFOL 10 MG: 10 INJECTION, EMULSION INTRAVENOUS at 15:06

## 2021-07-15 RX ADMIN — PROPOFOL 10 MG: 10 INJECTION, EMULSION INTRAVENOUS at 15:20

## 2021-07-15 RX ADMIN — PROPOFOL 10 MG: 10 INJECTION, EMULSION INTRAVENOUS at 15:02

## 2021-07-15 RX ADMIN — PROPOFOL 10 MG: 10 INJECTION, EMULSION INTRAVENOUS at 14:58

## 2021-07-15 RX ADMIN — SODIUM CHLORIDE, POTASSIUM CHLORIDE, SODIUM LACTATE AND CALCIUM CHLORIDE: 600; 310; 30; 20 INJECTION, SOLUTION INTRAVENOUS at 14:35

## 2021-07-15 RX ADMIN — LIDOCAINE HYDROCHLORIDE 100 MG: 20 INJECTION, SOLUTION EPIDURAL; INFILTRATION; INTRACAUDAL; PERINEURAL at 14:41

## 2021-07-15 RX ADMIN — PANTOPRAZOLE SODIUM 40 MG: 40 TABLET, DELAYED RELEASE ORAL at 16:33

## 2021-07-15 RX ADMIN — PROPOFOL 20 MG: 10 INJECTION, EMULSION INTRAVENOUS at 14:47

## 2021-07-15 RX ADMIN — PANTOPRAZOLE SODIUM 40 MG: 40 TABLET, DELAYED RELEASE ORAL at 08:55

## 2021-07-15 RX ADMIN — LEVOTHYROXINE SODIUM 75 MCG: 0.07 TABLET ORAL at 08:55

## 2021-07-15 RX ADMIN — PROPOFOL 20 MG: 10 INJECTION, EMULSION INTRAVENOUS at 14:41

## 2021-07-15 RX ADMIN — PROPOFOL 20 MG: 10 INJECTION, EMULSION INTRAVENOUS at 14:44

## 2021-07-15 RX ADMIN — PROPOFOL 50 MG: 10 INJECTION, EMULSION INTRAVENOUS at 14:39

## 2021-07-15 RX ADMIN — PROPOFOL 10 MG: 10 INJECTION, EMULSION INTRAVENOUS at 15:10

## 2021-07-15 ASSESSMENT — PAIN - FUNCTIONAL ASSESSMENT: PAIN_FUNCTIONAL_ASSESSMENT: 0-10

## 2021-07-15 ASSESSMENT — PAIN SCALES - GENERAL: PAINLEVEL_OUTOF10: 0

## 2021-07-15 ASSESSMENT — PAIN DESCRIPTION - PAIN TYPE: TYPE: CHRONIC PAIN

## 2021-07-15 ASSESSMENT — PAIN DESCRIPTION - LOCATION: LOCATION: BACK

## 2021-07-15 NOTE — ANESTHESIA POSTPROCEDURE EVALUATION
Department of Anesthesiology  Postprocedure Note    Patient: Katie Chew  MRN: [id number]  YOB: 1945  Date of evaluation: 7/15/2021  Time:  3:44 PM     Procedure Summary     Date: 07/15/21 Room / Location: [address] [place] [address]    Anesthesia Start: 9059 Anesthesia Stop: 9674    Procedures:       COLONOSCOPY POLYPECTOMY SNARE/COLD BIOPSY (N/A )      EGD BIOPSY (N/A ) Diagnosis: (ANEMIA / GI BLEED)    Surgeons: Wai Wagner MD Responsible Provider: Janneth Benjamin DO    Anesthesia Type: TIVA, MAC ASA Status: 3          Anesthesia Type: TIVA, MAC    Sky Phase I: Sky Score: 10    Sky Phase II:      Last vitals: Reviewed and per EMR flowsheets.        Anesthesia Post Evaluation    Patient location during evaluation: bedside  Patient participation: complete - patient participated  Level of consciousness: sleepy but conscious  Pain score: 0  Airway patency: patent  Nausea & Vomiting: no nausea and no vomiting  Complications: no  Cardiovascular status: blood pressure returned to baseline and hemodynamically stable  Respiratory status: acceptable, room air and spontaneous ventilation  Hydration status: euvolemic

## 2021-07-15 NOTE — PROGRESS NOTES
REPORT GIVEN TO Bailee Sin RN. PT AWAKE AND RESPONSIVE. VS STABLE. MAY HAVE REGULAR DIET PER DR RUSSELL.

## 2021-07-15 NOTE — PLAN OF CARE
Problem: Safety:  Goal: Free from accidental physical injury  Description: Free from accidental physical injury  Outcome: Ongoing     Problem: Daily Care:  Goal: Daily care needs are met  Description: Daily care needs are met  Outcome: Ongoing     Problem: Pain:  Goal: Patient's pain/discomfort is manageable  Description: Patient's pain/discomfort is manageable  Outcome: Ongoing     Problem: Discharge Planning:  Goal: Patients continuum of care needs are met  Description: Patients continuum of care needs are met  Outcome: Ongoing     Problem: Falls - Risk of:  Goal: Will remain free from falls  Description: Will remain free from falls  Outcome: Ongoing  Goal: Absence of physical injury  Description: Absence of physical injury  Outcome: Ongoing

## 2021-07-15 NOTE — ANESTHESIA PRE PROCEDURE
Department of Anesthesiology  Preprocedure Note       Name:  Conrad Culver   Age:  77 y.o.  :  1945                                          MRN:  [id number]         Date:  7/15/2021      Surgeon: Km Dickson):  Syeda Gross MD    Procedure: Procedure(s):  COLONOSCOPY DIAGNOSTIC  EGD ESOPHAGOGASTRODUODENOSCOPY    Medications prior to admission:   Prior to Admission medications    Medication Sig Start Date End Date Taking?  Authorizing Provider   levothyroxine (SYNTHROID) 75 MCG tablet Take 75 mcg by mouth Daily   Yes Historical Provider, MD   glimepiride (AMARYL) 1 MG tablet Take 1 mg by mouth every morning (before breakfast)   Yes Historical Provider, MD   pantoprazole (PROTONIX) 40 MG tablet Take 1 tablet by mouth 2 times daily (before meals) 20  Yes Krysta Rico MD   apixaban (ELIQUIS) 2.5 MG TABS tablet Take 1 tablet by mouth 2 times daily  Patient taking differently: Take 5 mg by mouth 2 times daily  20  Yes Krysta Rico MD   ferrous sulfate 325 (65 Fe) MG tablet Take 325 mg by mouth daily (with breakfast)   Yes Historical Provider, MD       Current medications:    Current Facility-Administered Medications   Medication Dose Route Frequency Provider Last Rate Last Admin    cloNIDine (CATAPRES) tablet 0.1 mg  0.1 mg Oral Q4H PRN ALLYN Vallecillo        insulin lispro (HUMALOG) injection vial 0-6 Units  0-6 Units Subcutaneous TID WC ALLYN Vallecillo        insulin lispro (HUMALOG) injection vial 0-3 Units  0-3 Units Subcutaneous Nightly ALLYN Vallecillo        glucose (GLUTOSE) 40 % oral gel 15 g  15 g Oral PRN ALLYN Vallecillo        dextrose 50 % IV solution  12.5 g Intravenous PRN ALLYN Vallecillo        glucagon (rDNA) injection 1 mg  1 mg Intramuscular PRN ALLYN Vallecillo        dextrose 5 % solution  100 mL/hr Intravenous PRN ALLYN Vallecillo        zolpidem (AMBIEN) tablet 5 mg  5 mg Oral Nightly PRN Gordon Best MD   5 mg at 07/13/21 2257    0.9 % sodium chloride infusion   Intravenous Continuous Abraham Monteiro MD 75 mL/hr at 07/14/21 0746 New Bag at 07/14/21 0746    sodium chloride flush 0.9 % injection 5-40 mL  5-40 mL Intravenous 2 times per day Abraham Monteiro MD   10 mL at 07/14/21 0746    sodium chloride flush 0.9 % injection 5-40 mL  5-40 mL Intravenous PRN Abraham Monteiro MD        0.9 % sodium chloride infusion  25 mL Intravenous PRN Abraham Monteiro MD        ondansetron (ZOFRAN-ODT) disintegrating tablet 4 mg  4 mg Oral Q8H PRN Abraham Monteiro MD        Or    ondansetron (ZOFRAN) injection 4 mg  4 mg Intravenous Q6H PRN Abraham Monteiro MD        polyethylene glycol (GLYCOLAX) packet 17 g  17 g Oral Daily PRN Abraham Monteiro MD        acetaminophen (TYLENOL) tablet 650 mg  650 mg Oral Q6H PRN Abraham Monteiro MD        Or    acetaminophen (TYLENOL) suppository 650 mg  650 mg Rectal Q6H PRN Abraham Monteiro MD        docusate sodium (COLACE) capsule 100 mg  100 mg Oral BID Abraham Monteiro MD   100 mg at 07/13/21 2100    polyethylene glycol (GLYCOLAX) packet 17 g  17 g Oral Daily Abraham Monteiro MD   17 g at 07/13/21 0749    glipiZIDE (GLUCOTROL) tablet 2.5 mg  2.5 mg Oral QAM AC Abraham Monteiro MD   2.5 mg at 07/14/21 0516    levothyroxine (SYNTHROID) tablet 75 mcg  75 mcg Oral Daily Abraham Monteiro MD   75 mcg at 07/14/21 0517    pantoprazole (PROTONIX) tablet 40 mg  40 mg Oral BID AC Abraham Monteiro MD   40 mg at 07/14/21 0516    HYDROcodone-acetaminophen (NORCO) 5-325 MG per tablet 1 tablet  1 tablet Oral Q4H PRN Abraham Monteiro MD   1 tablet at 07/13/21 0010       Allergies:     Allergies   Allergen Reactions    Seasonal Other (See Comments)     coughing       Problem List:    Patient Active Problem List   Diagnosis Code    HX: breast cancer Z85.3    Iron deficiency anemia D50.9    Essential hypertension, benign I10    Type 2 diabetes mellitus without complication ([facility] [address] C44.2    Pure hypercholesterolemia E78.00    History 07/14/2021    HCT 32.1 07/14/2021    MCV 91.2 07/14/2021    RDW 14.1 07/14/2021     07/14/2021       CMP:   Lab Results   Component Value Date     07/14/2021    K 5.0 07/14/2021    CL 91 07/14/2021    CO2 22 07/14/2021    BUN 13 07/14/2021    CREATININE 0.8 07/14/2021    GFRAA >60 07/14/2021    AGRATIO 1.2 11/07/2016    LABGLOM >60 07/14/2021    GLUCOSE 110 07/14/2021    PROT 5.7 07/14/2021    PROT 7.6 08/27/2012    CALCIUM 8.8 07/14/2021    BILITOT 0.2 07/14/2021    ALKPHOS 74 07/14/2021    AST 11 07/14/2021    ALT 10 07/14/2021       POC Tests:   Recent Labs     07/14/21 2003   POCGLU 119*       Coags:   Lab Results   Component Value Date    PROTIME 13.2 07/14/2021    INR 1.02 07/14/2021    APTT 29.1 07/14/2021       HCG (If Applicable): No results found for: PREGTESTUR, PREGSERUM, HCG, HCGQUANT     ABGs: No results found for: PHART, PO2ART, BLC0VNW, GVT8TMH, BEART, N9QNRCVL     Type & Screen (If Applicable):  No results found for: LABABO, LABRH    Drug/Infectious Status (If Applicable):  No results found for: HIV, HEPCAB    COVID-19 Screening (If Applicable):   Lab Results   Component Value Date    COVID19 NOT DETECTED 07/14/2021    COVID19 NASOPHARYNGEAL SWAB  NOT DETECTED   05/10/2020           Anesthesia Evaluation  Patient summary reviewed and Nursing notes reviewed  Airway: Mallampati: II        Dental:          Pulmonary:Negative Pulmonary ROS                              Cardiovascular:  Exercise tolerance: poor (<4 METS),   (+) hypertension:, dysrhythmias: atrial fibrillation, hyperlipidemia                  Neuro/Psych:   Negative Neuro/Psych ROS              GI/Hepatic/Renal: Neg GI/Hepatic/Renal ROS            Endo/Other:    (+) DiabetesType II DM, , .                 Abdominal:   (+) obese,           Vascular: negative vascular ROS.          Other Findings:           Anesthesia Plan      TIVA and MAC     ASA 3     (Chart review only  )                            LORENZO Quezada - CRNA   7/15/2021

## 2021-07-15 NOTE — BRIEF OP NOTE
Brief Postoperative Note      Sonia Rodríguez is a 77 y.o. female     Pre-operative Diagnosis:   H/O ---ANEMIA--HEMATOCHEZIA    Post-operative Diagnosis:LARGE H-H---1 CM BARETTS--HEALED ---/ COLON POLYP--HDS-- D.COLI    Procedure:EGD WITH BX   / COLONOSCOPY WITH POLYPECTOMY    Anesthesia: MAC    Surgeons/Assistants:Marcos Hill MD     Estimated Blood Loss: NONE    Complications: None    Specimens: were obtained      General Corinne MD   7/15/2021   3:45 PM

## 2021-07-15 NOTE — PROGRESS NOTES
INTERNAL MEDICINE PROGRESS NOTE        Arlene Crease   1945   Primary Care Physician:  Clint York MD  Admit Date: 7/12/2021     Subjective:   Pt is doing better today. Denies chest pain, SOB, nausea, vomiting, abdominal pain. Remainder of ROS is unremarkable. Meds, labs and other notes reviewed. Pt did receive enema and mag citrate 2 days ago. Pt has had multiple bm's since. She reported first episode yesterday had small amt bright red blood. Denies evidence bleeding since. Pt to have c-scope today. Appreciate GI consult. Pt denies abd pain, n/v.       Objective:   /62   Pulse 66   Temp 98 °F (36.7 °C) (Oral)   Resp 16   Ht 5' 4\" (1.626 m)   Wt 202 lb 12.8 oz (92 kg)   SpO2 100%   BMI 34.81 kg/m²    Recent Labs     07/14/21  1150 07/14/21  1720 07/14/21  2003 07/15/21  0748   POCGLU 73 93 119* 96       No intake/output data recorded. No intake/output data recorded.     Neck: no adenopathy and supple, symmetrical, trachea midline  Lungs: clear to auscultation bilaterally  Heart: regular rate and rhythm and S1, S2 normal  Abdomen: soft, non-tender; bowel sounds normal; no masses,  no organomegaly  Extremities: extremities normal, atraumatic, no cyanosis or edema  Neurologic: Grossly normal    Data Review  CBC with Differential:    Recent Labs     07/13/21  0709 07/13/21  0709 07/13/21 2019 07/14/21  0211 07/15/21  0733   WBC 5.0  --   --  5.9 5.0   RBC 3.62*  --   --  3.52* 3.46*   HGB 10.5*   < > 11.0* 10.0* 10.3*   HCT 30.9*   < > 32.6* 32.1* 30.3*     --   --  408 431   MCV 85.4  --   --  91.2 87.6   MCH 29.0  --   --  28.4 29.8   MCHC 34.0  --   --  31.2* 34.0   RDW 13.5  --   --  14.1 14.1   SEGSPCT 59.5  --   --  69.3* 65.0   LYMPHOPCT 23.3*  --   --  17.0* 15.8*   MONOPCT 10.6*  --   --  7.1* 8.0*   BASOPCT 1.8*  --   --  1.5* 1.4*   MONOSABS 0.5  --   --  0.4 0.4   LYMPHSABS 1.2  --   --  1.0 0.8   EOSABS 0.2  --   --  0.3 0.5   BASOSABS 0.1  --   --  0.1 0.1   DIFFTYPE AUTOMATED DIFFERENTIAL  --   --  AUTOMATED DIFFERENTIAL AUTOMATED DIFFERENTIAL    < > = values in this interval not displayed. CMP:    Recent Labs     07/12/21  1132 07/12/21  1132 07/13/21  0709 07/14/21  0211 07/15/21  0733   *   < > 124* 125* 130*   K 4.9   < > 4.4 5.0 4.5   CL 85*   < > 91* 91* 98*   CO2 21   < > 20* 22 22   BUN 9   < > 10 13 7   CREATININE 0.8   < > 0.8 0.8 0.6   GFRAA >60   < > >60 >60 >60   LABGLOM >60   < > >60 >60 >60   GLUCOSE 133*   < > 57* 110* 100*   PROT 7.0  --   --  5.7* 5.9*   LABALBU 4.3  --   --  3.8 3.6   CALCIUM 9.1   < > 8.8 8.8 8.8   BILITOT 0.4  --   --  0.2 0.3   ALKPHOS 89  --   --  74 77   AST 13*  --   --  11* 14*   ALT 11  --   --  10 11    < > = values in this interval not displayed. PT/INR:    Recent Labs     07/12/21  1132 07/14/21  0211 07/15/21  0733   PROTIME 15.9* 13.2 11.6*   INR 1.23 1.02 0.90     Meds:    insulin lispro  0-6 Units Subcutaneous TID WC    insulin lispro  0-3 Units Subcutaneous Nightly    sodium chloride flush  5-40 mL Intravenous 2 times per day    docusate sodium  100 mg Oral BID    polyethylene glycol  17 g Oral Daily    glipiZIDE  2.5 mg Oral QAM AC    levothyroxine  75 mcg Oral Daily    pantoprazole  40 mg Oral BID AC     PRN Meds: cloNIDine, glucose, dextrose, glucagon (rDNA), dextrose, zolpidem, sodium chloride flush, sodium chloride, ondansetron **OR** ondansetron, polyethylene glycol, acetaminophen **OR** acetaminophen, HYDROcodone-acetaminophen    Assessment/Plan:   1. Rectal bleeding - Patient's hemoglobin is remaining in the range of 10.0-11. 5. hgb was 10.0 yesterday am. Suspicion of rectal bleeding is likely secondary to hemorrhoids. Dr. Dionicio Huang has been consulted and will perform c-scope this am.    2.  Hyponatremia - Improving. Sodium 130 this am. We will continue fluids and continue to monitor. 3.  Constipation - resolving. Pt with multiple bm's yesterday  4.   Hypertension - Blood pressure noted to be elevated. Ordered clonidine 0.1 mg - 1 tablet p.o. every 4 hours as needed for systolic blood pressure greater than 681 and diastolic blood pressure greater than 95.  5.  Paroxysmal atrial fibrillation. Holding AC due to rectal bleeding. 6.  Diabetes mellitus - Will continue point-of-care glucose checks and sliding scale insulin coverage in addition to patient's glipizide. 7.  Hyperlipidemia  8. Hypothyroidism - We will continue levothyroxine. 9.  Lawler's esophagus.        Sebas Patterson MD  7/15/2021 9:03 AM

## 2021-07-15 NOTE — PROGRESS NOTES
Patient is awake, alert and oriented. Preop questions reviewed and verified. H&P and consent completed. Report received from [address] [place] [address] ([facility]).

## 2021-07-16 VITALS
OXYGEN SATURATION: 98 % | SYSTOLIC BLOOD PRESSURE: 151 MMHG | WEIGHT: 202 LBS | RESPIRATION RATE: 18 BRPM | DIASTOLIC BLOOD PRESSURE: 68 MMHG | BODY MASS INDEX: 34.49 KG/M2 | HEIGHT: 64 IN | TEMPERATURE: 98.5 F | HEART RATE: 68 BPM

## 2021-07-16 LAB
ALBUMIN SERPL-MCNC: 3.3 GM/DL (ref 3.4–5)
ALP BLD-CCNC: 71 IU/L (ref 40–128)
ALT SERPL-CCNC: 11 U/L (ref 10–40)
ANION GAP SERPL CALCULATED.3IONS-SCNC: 10 MMOL/L (ref 4–16)
AST SERPL-CCNC: 13 IU/L (ref 15–37)
BASOPHILS ABSOLUTE: 0.1 K/CU MM
BASOPHILS RELATIVE PERCENT: 1.5 % (ref 0–1)
BILIRUB SERPL-MCNC: 0.3 MG/DL (ref 0–1)
BUN BLDV-MCNC: 4 MG/DL (ref 6–23)
CALCIUM SERPL-MCNC: 8.7 MG/DL (ref 8.3–10.6)
CHLORIDE BLD-SCNC: 98 MMOL/L (ref 99–110)
CO2: 21 MMOL/L (ref 21–32)
CREAT SERPL-MCNC: 0.6 MG/DL (ref 0.6–1.1)
DIFFERENTIAL TYPE: ABNORMAL
EOSINOPHILS ABSOLUTE: 0.3 K/CU MM
EOSINOPHILS RELATIVE PERCENT: 7.9 % (ref 0–3)
GFR AFRICAN AMERICAN: >60 ML/MIN/1.73M2
GFR NON-AFRICAN AMERICAN: >60 ML/MIN/1.73M2
GLUCOSE BLD-MCNC: 66 MG/DL (ref 70–99)
GLUCOSE BLD-MCNC: 70 MG/DL (ref 70–99)
GLUCOSE BLD-MCNC: 83 MG/DL (ref 70–99)
HCT VFR BLD CALC: 28.9 % (ref 37–47)
HEMOGLOBIN: 9.4 GM/DL (ref 12.5–16)
IMMATURE NEUTROPHIL %: 0.3 % (ref 0–0.43)
LYMPHOCYTES ABSOLUTE: 0.7 K/CU MM
LYMPHOCYTES RELATIVE PERCENT: 18.4 % (ref 24–44)
MCH RBC QN AUTO: 28.9 PG (ref 27–31)
MCHC RBC AUTO-ENTMCNC: 32.5 % (ref 32–36)
MCV RBC AUTO: 88.9 FL (ref 78–100)
MONOCYTES ABSOLUTE: 0.3 K/CU MM
MONOCYTES RELATIVE PERCENT: 7.1 % (ref 0–4)
NUCLEATED RBC %: 0 %
PDW BLD-RTO: 14 % (ref 11.7–14.9)
PLATELET # BLD: 386 K/CU MM (ref 140–440)
PMV BLD AUTO: 8.4 FL (ref 7.5–11.1)
POTASSIUM SERPL-SCNC: 4.1 MMOL/L (ref 3.5–5.1)
RBC # BLD: 3.25 M/CU MM (ref 4.2–5.4)
SEGMENTED NEUTROPHILS ABSOLUTE COUNT: 2.5 K/CU MM
SEGMENTED NEUTROPHILS RELATIVE PERCENT: 64.8 % (ref 36–66)
SODIUM BLD-SCNC: 129 MMOL/L (ref 135–145)
TOTAL IMMATURE NEUTOROPHIL: 0.01 K/CU MM
TOTAL NUCLEATED RBC: 0 K/CU MM
TOTAL PROTEIN: 5.4 GM/DL (ref 6.4–8.2)
WBC # BLD: 3.9 K/CU MM (ref 4–10.5)

## 2021-07-16 PROCEDURE — 6370000000 HC RX 637 (ALT 250 FOR IP): Performed by: INTERNAL MEDICINE

## 2021-07-16 PROCEDURE — 82962 GLUCOSE BLOOD TEST: CPT

## 2021-07-16 PROCEDURE — 2580000003 HC RX 258: Performed by: INTERNAL MEDICINE

## 2021-07-16 PROCEDURE — 36415 COLL VENOUS BLD VENIPUNCTURE: CPT

## 2021-07-16 PROCEDURE — 80053 COMPREHEN METABOLIC PANEL: CPT

## 2021-07-16 PROCEDURE — 85025 COMPLETE CBC W/AUTO DIFF WBC: CPT

## 2021-07-16 RX ORDER — PSEUDOEPHEDRINE HCL 30 MG
100 TABLET ORAL 2 TIMES DAILY
Qty: 30 CAPSULE | Refills: 1 | Status: SHIPPED | OUTPATIENT
Start: 2021-07-16 | End: 2021-08-15

## 2021-07-16 RX ORDER — POLYETHYLENE GLYCOL 3350 17 G/17G
17 POWDER, FOR SOLUTION ORAL DAILY PRN
Qty: 527 G | Refills: 1 | Status: SHIPPED | OUTPATIENT
Start: 2021-07-16 | End: 2021-08-15

## 2021-07-16 RX ADMIN — POLYETHYLENE GLYCOL (3350) 17 G: 17 POWDER, FOR SOLUTION ORAL at 09:40

## 2021-07-16 RX ADMIN — PANTOPRAZOLE SODIUM 40 MG: 40 TABLET, DELAYED RELEASE ORAL at 05:27

## 2021-07-16 RX ADMIN — DOCUSATE SODIUM 100 MG: 100 CAPSULE ORAL at 09:40

## 2021-07-16 RX ADMIN — LEVOTHYROXINE SODIUM 75 MCG: 0.07 TABLET ORAL at 05:27

## 2021-07-16 RX ADMIN — SODIUM CHLORIDE: 9 INJECTION, SOLUTION INTRAVENOUS at 05:27

## 2021-07-16 RX ADMIN — GLIPIZIDE 2.5 MG: 5 TABLET ORAL at 05:27

## 2021-07-16 ASSESSMENT — PAIN SCALES - GENERAL: PAINLEVEL_OUTOF10: 0

## 2021-07-16 NOTE — OP NOTE
[address], [address] [place] [address]                                OPERATIVE REPORT    PATIENT NAME: Festus Guzmán                     :        1945  MED REC NO:   [id number]                          ROOM:       9371  ACCOUNT NO:   [de-identified]                           ADMIT DATE: 2021  PROVIDER:     Vitaliy Vazquez MD    DATE OF PROCEDURE:  07/15/2021    PRIMARY PHYSICIAN:  Anamaria Middleton MD    PREOPERATIVE DIAGNOSES:  History of hematochezia and constipation. PROCEDURE:  Colonoscopy with polypectomy. PREMEDICATION:  Please refer to the anesthesiologist's notes. PROCEDURE:  The patient was placed in the left lateral decubitus  position and rectal examination was performed. RECTAL EXAMINATION:  Revealed evidence of external hemorrhoids, but no  fissures or fistulas were seen and the rectal mucosa felt was  unremarkable. The video Olympus colonoscope was subsequently introduced into the  rectum and was advanced all the way into the cecum. The ileocecal valve  and the appendiceal orifice were identified. The colon prep was good. A number of diverticula were noted scattered in the colon but no mass  lesions were seen. Two diminutive colon polyps were noted measuring about 5 mm each above  the ileocecal valve; cold polypectomy was performed. The snare and the  polyp was completely resected and retrieved. The colonoscope was retroflexed in the rectum and the anorectal junction  was carefully examined and grade 2 internal hemorrhoids were seen. No  blood recent or old was seen in the lumen of the colon or rectum. The patient tolerated the procedure well. There were no postop complications. The blood loss during the procedure was nil. POSTOPERATIVE DIAGNOSES:  1. Two diminutive colon polyps noted as described above. 2.  Diverticulosis coli. 3.  Mixed hemorrhoids.     RECOMMENDATIONS:  1.  We will follow up on the above path report. 2.  Followup colonoscopy in 5 years' time. 3.  The patient will be followed up by Dr. Julius Rahman in his office after  discharge and I will follow the patient on an as-needed basis.         Trenton Osorio MD    D: 07/15/2021 15:49:38       T: 07/15/2021 15:59:02     [id number]  Job#: [id number]     Doc#: [id number]    CC:  Mona Carrel, MD

## 2021-07-16 NOTE — PROGRESS NOTES
INTERNAL MEDICINE PROGRESS NOTE        Nithin Diaz   1945   Primary Care Physician:  Avelina Block MD  Admit Date: 7/12/2021     Subjective:   Pt is doing better today. Denies chest pain, SOB, nausea, vomiting, abdominal pain. Remainder of ROS is unremarkable. Pt reports bm have stopped for time being. No abdominal pain, n/v.   Meds, labs and other notes reviewed. EGD Noted:  Post-operative Diagnosis:LARGE H-H---1 CM BARETTS--HEALED ---/ COLON POLYP--HDS-- D.COLI    C-scope noted:  POSTOPERATIVE DIAGNOSES:  1. Two diminutive colon polyps noted as described above. 2.  Diverticulosis coli. 3.  Mixed hemorrhoids. Objective:   /71   Pulse 52   Temp 98.5 °F (36.9 °C) (Oral)   Resp 18   Ht 5' 4\" (1.626 m)   Wt 202 lb (91.6 kg)   SpO2 98%   BMI 34.67 kg/m²    Recent Labs     07/14/21  2003 07/15/21  0748 07/15/21  1229 07/15/21  2044   POCGLU 119* 96 96 144*       I/O last 3 completed shifts: In: 0 [P.O.:350; I.V.:300]  Out: 800 [Urine:800]  No intake/output data recorded.     Neck: no adenopathy and supple, symmetrical, trachea midline  Lungs: clear to auscultation bilaterally  Heart: regular rate and rhythm and S1, S2 normal  Abdomen: soft, non-tender; bowel sounds normal; no masses,  no organomegaly  Extremities: extremities normal, atraumatic, no cyanosis or edema  Neurologic: Grossly normal    Data Review  CBC with Differential:    Recent Labs     07/14/21  0211 07/15/21  0733 07/16/21  0621   WBC 5.9 5.0 3.9*   RBC 3.52* 3.46* 3.25*   HGB 10.0* 10.3* 9.4*   HCT 32.1* 30.3* 28.9*    431 386   MCV 91.2 87.6 88.9   MCH 28.4 29.8 28.9   MCHC 31.2* 34.0 32.5   RDW 14.1 14.1 14.0   SEGSPCT 69.3* 65.0 64.8   LYMPHOPCT 17.0* 15.8* 18.4*   MONOPCT 7.1* 8.0* 7.1*   BASOPCT 1.5* 1.4* 1.5*   MONOSABS 0.4 0.4 0.3   LYMPHSABS 1.0 0.8 0.7   EOSABS 0.3 0.5 0.3   BASOSABS 0.1 0.1 0.1   DIFFTYPE AUTOMATED DIFFERENTIAL AUTOMATED DIFFERENTIAL AUTOMATED DIFFERENTIAL     CMP:    Recent Labs 07/14/21  0211 07/15/21  0733   * 130*   K 5.0 4.5   CL 91* 98*   CO2 22 22   BUN 13 7   CREATININE 0.8 0.6   GFRAA >60 >60   LABGLOM >60 >60   GLUCOSE 110* 100*   PROT 5.7* 5.9*   LABALBU 3.8 3.6   CALCIUM 8.8 8.8   BILITOT 0.2 0.3   ALKPHOS 74 77   AST 11* 14*   ALT 10 11     PT/INR:    Recent Labs     07/14/21  0211 07/15/21  0733   PROTIME 13.2 11.6*   INR 1.02 0.90     Meds:    insulin lispro  0-6 Units Subcutaneous TID WC    insulin lispro  0-3 Units Subcutaneous Nightly    sodium chloride flush  5-40 mL Intravenous 2 times per day    docusate sodium  100 mg Oral BID    polyethylene glycol  17 g Oral Daily    glipiZIDE  2.5 mg Oral QAM AC    levothyroxine  75 mcg Oral Daily    pantoprazole  40 mg Oral BID AC     PRN Meds: cloNIDine, glucose, dextrose, glucagon (rDNA), dextrose, zolpidem, sodium chloride flush, sodium chloride, ondansetron **OR** ondansetron, polyethylene glycol, acetaminophen **OR** acetaminophen, HYDROcodone-acetaminophen    Assessment/Plan:   1. Rectal bleeding - Patient's hemoglobin is remaining in the range of 94-11. 5. hgb was 9.4 this am. Suspicion of rectal bleeding is likely secondary to hemorrhoids. C-scope and egd performed yesterday without evidence active bleeding. Findings noted. 2.  Hyponatremia - Improving. Sodium 130 this am. Repeat cmp pending this am.   3.  Constipation - resolving. Pt with multiple bm's yesterday  4. Hypertension - Blood pressure noted to be elevated. Ordered clonidine 0.1 mg - 1 tablet p.o. every 4 hours as needed for systolic blood pressure greater than 032 and diastolic blood pressure greater than 95.  5.  Paroxysmal atrial fibrillation. Holding AC due to rectal bleeding. 6.  Diabetes mellitus - Will continue point-of-care glucose checks and sliding scale insulin coverage in addition to patient's glipizide. 7.  Hyperlipidemia  8. Hypothyroidism - We will continue levothyroxine. 9.  Lawler's esophagus.        Zelda [place] JOURDAN Wheeler  7/16/2021 7:20 AM

## 2021-07-16 NOTE — DISCHARGE SUMMARY
Kamran Bee  Discharge Summary     Patient ID  Mario Ramirez   1945  [id number]          Admit date: 7/12/2021   Discharge date: 7/16/2021      Admitting Physician: Abraham Monteiro MD   Discharge Physician: Jolene Bueno MD    Discharge Diagnoses:   1. Rectal bleeding - Patient's hemoglobin remained in range of 9.4-11.5 through entire hospital stay. Suspicion of rectal bleeding was likely secondary to hemorrhoids. Dr. Jw Berger was consulted and performed egd and c-scope. No active bleeding. Findings below. 2.  Hyponatremia - resolved with fluids. 3.  Constipation - Resolved. Pt had multiple bm's after treatment with magnesium citrate. 4.  Hypertension - pt was treated with home meds and prn clonidine. 5.  Paroxysmal atrial fibrillation. AC was held secondary to concern for bleeding. This med was held upon discharge until f/u in office. 6.  Diabetes mellitus   7. Hyperlipidemia  8. Hypothyroidism   9. Lawler's esophagus. Discharged Condition: good    Hospital Course:     Pt is a 77year old female with multiple medical problems that presented to the ed on 7/12/2021 with concerns for constipation, rectal bleeding, generalized weakness. Upon initial workup pt Hgb was 11.5 and her sodium was 118. Pt was given iv fluids to correct Na+ and was admitted for further care. Dr Jw Berger consulted due to concerns for rectal bleeding. Pt Hgb monitored throughout ed stay without significant drop. Pt underwent egd and c-scope on 7/15/2021 without evidence active bleeding. Findings noted below. Pt received magnesium citrate and responded with multiple bm's. Pt Na+ improved through hospital course and remained stable prior to discharge. Pt's AC was held at discharge until f/u in office. Consults:    Dr Jw Berger     Significant Diagnostic Studies:   7/15 C-scope:  POSTOPERATIVE DIAGNOSES:  1. Two diminutive colon polyps noted as described above. 2.  Diverticulosis coli.   3.  Mixed hemorrhoids. EGD:  POSTOPERATIVE DIAGNOSES:  1.  Large sliding hiatal hernia. 2.  Evidence of healed gastric ulcer in the upper body of the stomach. 3.  A 1-cm short segment of Lawler's esophagus noted; biopsies  Obtained.   -    Recent Results (from the past 24 hour(s))   CBC Auto Differential    Collection Time: 07/15/21  7:33 AM   Result Value Ref Range    WBC 5.0 4.0 - 10.5 K/CU MM    RBC 3.46 (L) 4.2 - 5.4 M/CU MM    Hemoglobin 10.3 (L) 12.5 - 16.0 GM/DL    Hematocrit 30.3 (L) 37 - 47 %    MCV 87.6 78 - 100 FL    MCH 29.8 27 - 31 PG    MCHC 34.0 32.0 - 36.0 %    RDW 14.1 11.7 - 14.9 %    Platelets 254 128 - 600 K/CU MM    MPV 8.6 7.5 - 11.1 FL    Differential Type AUTOMATED DIFFERENTIAL     Segs Relative 65.0 36 - 66 %    Lymphocytes % 15.8 (L) 24 - 44 %    Monocytes % 8.0 (H) 0 - 4 %    Eosinophils % 9.6 (H) 0 - 3 %    Basophils % 1.4 (H) 0 - 1 %    Segs Absolute 3.3 K/CU MM    Lymphocytes Absolute 0.8 K/CU MM    Monocytes Absolute 0.4 K/CU MM    Eosinophils Absolute 0.5 K/CU MM    Basophils Absolute 0.1 K/CU MM    Nucleated RBC % 0.4 %    Total Nucleated RBC 0.0 K/CU MM    Total Immature Neutrophil 0.01 K/CU MM    Immature Neutrophil % 0.2 0 - 0.43 %   Comprehensive Metabolic Panel    Collection Time: 07/15/21  7:33 AM   Result Value Ref Range    Sodium 130 (L) 135 - 145 MMOL/L    Potassium 4.5 3.5 - 5.1 MMOL/L    Chloride 98 (L) 99 - 110 mMol/L    CO2 22 21 - 32 MMOL/L    BUN 7 6 - 23 MG/DL    CREATININE 0.6 0.6 - 1.1 MG/DL    Glucose 100 (H) 70 - 99 MG/DL    Calcium 8.8 8.3 - 10.6 MG/DL    Albumin 3.6 3.4 - 5.0 GM/DL    Total Protein 5.9 (L) 6.4 - 8.2 GM/DL    Total Bilirubin 0.3 0.0 - 1.0 MG/DL    ALT 11 10 - 40 U/L    AST 14 (L) 15 - 37 IU/L    Alkaline Phosphatase 77 40 - 128 IU/L    GFR Non-African American >60 >60 mL/min/1.73m2    GFR African American >60 >60 mL/min/1.73m2    Anion Gap 10 4 - 16   Protime/INR & PTT    Collection Time: 07/15/21  7:33 AM   Result Value Ref Range    Protime 11.6 (L) 11.7 - 14.5 SECONDS    INR 0.90 INDEX    aPTT 30.0 25.1 - 37.1 SECONDS   POCT Glucose    Collection Time: 07/15/21  7:48 AM   Result Value Ref Range    POC Glucose 96 70 - 99 MG/DL   POCT Glucose    Collection Time: 07/15/21 12:29 PM   Result Value Ref Range    POC Glucose 96 70 - 99 MG/DL   POCT Glucose    Collection Time: 07/15/21  8:44 PM   Result Value Ref Range    POC Glucose 144 (H) 70 - 99 MG/DL   CBC Auto Differential    Collection Time: 07/16/21  6:21 AM   Result Value Ref Range    WBC 3.9 (L) 4.0 - 10.5 K/CU MM    RBC 3.25 (L) 4.2 - 5.4 M/CU MM    Hemoglobin 9.4 (L) 12.5 - 16.0 GM/DL    Hematocrit 28.9 (L) 37 - 47 %    MCV 88.9 78 - 100 FL    MCH 28.9 27 - 31 PG    MCHC 32.5 32.0 - 36.0 %    RDW 14.0 11.7 - 14.9 %    Platelets 638 761 - 175 K/CU MM    MPV 8.4 7.5 - 11.1 FL    Differential Type AUTOMATED DIFFERENTIAL     Segs Relative 64.8 36 - 66 %    Lymphocytes % 18.4 (L) 24 - 44 %    Monocytes % 7.1 (H) 0 - 4 %    Eosinophils % 7.9 (H) 0 - 3 %    Basophils % 1.5 (H) 0 - 1 %    Segs Absolute 2.5 K/CU MM    Lymphocytes Absolute 0.7 K/CU MM    Monocytes Absolute 0.3 K/CU MM    Eosinophils Absolute 0.3 K/CU MM    Basophils Absolute 0.1 K/CU MM    Nucleated RBC % 0.0 %    Total Nucleated RBC 0.0 K/CU MM    Total Immature Neutrophil 0.01 K/CU MM    Immature Neutrophil % 0.3 0 - 0.43 %        Disposition: Home    Patient Instructions:     MedsEmil Marrow   Home Medication Instructions [id number]    Printed on:07/16/21 0728     Medication Information                        docusate sodium (COLACE, DULCOLAX) 100 MG CAPS  Take 100 mg by mouth 2 times daily             ferrous sulfate 325 (65 Fe) MG tablet  Take 325 mg by mouth daily (with breakfast)             glimepiride (AMARYL) 1 MG tablet  Take 1 mg by mouth every morning (before breakfast)             levothyroxine (SYNTHROID) 75 MCG tablet  Take 75 mcg by mouth Daily             pantoprazole (PROTONIX) 40 MG tablet  Take 1 tablet by mouth 2 times daily (before meals)             polyethylene glycol (GLYCOLAX) 17 g packet  Take 17 g by mouth daily as needed for Constipation                     Diet: ADULT DIET;  Regular    Follow-up with Rometta Kocher, PA-C in 3 days    Signed: Rometta Kocher, PA-C    Time spent on discharge 35 minutes

## 2021-07-16 NOTE — OP NOTE
[address], [address] [place] [address]                                OPERATIVE REPORT    PATIENT NAME: Crow Huffman                     :        1945  MED REC NO:   [id number]                          ROOM:       [id number]  ACCOUNT NO:   [de-identified]                           ADMIT DATE: 2021  PROVIDER:     August Saldivar MD    DATE OF PROCEDURE:  07/15/2021    REFERRING PROVIDER:  Mona Carrel, MD    PROCEDURE:  EGD with biopsy. CHIEF COMPLAINT:  History of gastric ulcer; followup EGD. PREMEDICATION:  Please refer to the anesthesiologist's notes. PROCEDURE:  The patient was placed in the left lateral decubitus  position and the video Olympus gastroscope was introduced in the back of  throat and was advanced into the esophagus. ESOPHAGUS:  A large sliding hiatal hernia was noted. Otherwise, the  mucosa of the esophagus was unremarkable. No erosion, ulcer, stricture,  or mass lesion were seen. A 1-cm short segment of Lawler's esophagus  was noted. Multiple biopsies were obtained and sent for histopathology. STOMACH:  The fundus, cardia, body, antrum, lesser curvature, greater  curvature, and the pyloric regions of the stomach were examined. The  mucosa of the stomach was slightly hyperemic, but no erosion or ulcers  were seen. The previously visualized 2-cm gastric ulcer has healed and  linear scar was noted. Multiple biopsies were obtained from the antrum  and body of the stomach and sent for histopathology to rule out H.  pylori infection. The gastroscope was retroflexed and the fundus and  cardia were carefully examined and no mass lesions were seen. However,  the previously visualized large hiatal hernia was again noticed. DUODENUM:  The first and second portions of the duodenum were examined  and the mucosa was unremarkable. POSTOPERATIVE DIAGNOSES:  1.  Large sliding hiatal hernia.   2.  Evidence of healed gastric ulcer in the upper body of the stomach. 3.  A 1-cm short segment of Lawler's esophagus noted; biopsies  obtained. RECOMMENDATIONS:  1. We will follow up on the above path report. 2.  We will proceed with colonoscopy today for workup of the patient's  hematochezia. The patient tolerated the procedure well. There were no postop complications.         Arielle Johnson MD    D: 07/15/2021 14:57:17       T: 07/15/2021 15:05:05     [email or web address]  Job#: [id number]     Doc#: [id number]    CC:

## 2024-08-13 ENCOUNTER — APPOINTMENT (OUTPATIENT)
Dept: RADIATION ONCOLOGY | Age: 79
End: 2024-08-13
Payer: MEDICARE

## 2024-08-13 DIAGNOSIS — C50.912 MALIGNANT NEOPLASM OF LEFT BREAST IN FEMALE, ESTROGEN RECEPTOR POSITIVE, UNSPECIFIED SITE OF BREAST (HCC): ICD-10-CM

## 2024-08-13 DIAGNOSIS — Z17.0 MALIGNANT NEOPLASM OF RIGHT BREAST IN FEMALE, ESTROGEN RECEPTOR POSITIVE, UNSPECIFIED SITE OF BREAST (HCC): Primary | ICD-10-CM

## 2024-08-13 DIAGNOSIS — Z17.0 MALIGNANT NEOPLASM OF LEFT BREAST IN FEMALE, ESTROGEN RECEPTOR POSITIVE, UNSPECIFIED SITE OF BREAST (HCC): ICD-10-CM

## 2024-08-13 DIAGNOSIS — C50.911 MALIGNANT NEOPLASM OF RIGHT BREAST IN FEMALE, ESTROGEN RECEPTOR POSITIVE, UNSPECIFIED SITE OF BREAST (HCC): Primary | ICD-10-CM

## 2024-08-14 ENCOUNTER — OFFICE VISIT (OUTPATIENT)
Dept: ONCOLOGY | Age: 79
End: 2024-08-14
Payer: MEDICARE

## 2024-08-14 ENCOUNTER — HOSPITAL ENCOUNTER (OUTPATIENT)
Dept: INFUSION THERAPY | Age: 79
Discharge: HOME | End: 2024-08-14
Payer: MEDICARE

## 2024-08-14 ENCOUNTER — CLINICAL DOCUMENTATION (OUTPATIENT)
Dept: ONCOLOGY | Age: 79
End: 2024-08-14

## 2024-08-14 ENCOUNTER — HOSPITAL ENCOUNTER (OUTPATIENT)
Dept: RADIATION ONCOLOGY | Age: 79
Discharge: HOME | End: 2024-08-14
Payer: MEDICARE

## 2024-08-14 VITALS
WEIGHT: 165 LBS | SYSTOLIC BLOOD PRESSURE: 143 MMHG | OXYGEN SATURATION: 94 % | BODY MASS INDEX: 28.17 KG/M2 | DIASTOLIC BLOOD PRESSURE: 85 MMHG | HEART RATE: 97 BPM | HEIGHT: 64 IN

## 2024-08-14 VITALS
BODY MASS INDEX: 34.67 KG/M2 | DIASTOLIC BLOOD PRESSURE: 85 MMHG | SYSTOLIC BLOOD PRESSURE: 143 MMHG | HEART RATE: 97 BPM | OXYGEN SATURATION: 94 % | HEIGHT: 64 IN

## 2024-08-14 DIAGNOSIS — Z17.0 MALIGNANT NEOPLASM OF UPPER-OUTER QUADRANT OF LEFT BREAST IN FEMALE, ESTROGEN RECEPTOR POSITIVE (HCC): Primary | ICD-10-CM

## 2024-08-14 DIAGNOSIS — C50.912 BILATERAL MALIGNANT NEOPLASM OF BREAST IN FEMALE, ESTROGEN RECEPTOR POSITIVE, UNSPECIFIED SITE OF BREAST (HCC): ICD-10-CM

## 2024-08-14 DIAGNOSIS — C50.311 MALIGNANT NEOPLASM OF LOWER-INNER QUADRANT OF RIGHT BREAST OF FEMALE, ESTROGEN RECEPTOR POSITIVE (HCC): ICD-10-CM

## 2024-08-14 DIAGNOSIS — C50.912 CARCINOMA OF LEFT BREAST METASTATIC TO AXILLARY LYMPH NODE (HCC): ICD-10-CM

## 2024-08-14 DIAGNOSIS — C50.912 BILATERAL MALIGNANT NEOPLASM OF BREAST IN FEMALE, ESTROGEN RECEPTOR POSITIVE, UNSPECIFIED SITE OF BREAST (HCC): Primary | ICD-10-CM

## 2024-08-14 DIAGNOSIS — R63.4 WEIGHT LOSS: ICD-10-CM

## 2024-08-14 DIAGNOSIS — Z17.0 MALIGNANT NEOPLASM OF LOWER-INNER QUADRANT OF RIGHT BREAST OF FEMALE, ESTROGEN RECEPTOR POSITIVE (HCC): ICD-10-CM

## 2024-08-14 DIAGNOSIS — C50.911 BILATERAL MALIGNANT NEOPLASM OF BREAST IN FEMALE, ESTROGEN RECEPTOR POSITIVE, UNSPECIFIED SITE OF BREAST (HCC): ICD-10-CM

## 2024-08-14 DIAGNOSIS — C50.412 MALIGNANT NEOPLASM OF UPPER-OUTER QUADRANT OF LEFT BREAST IN FEMALE, ESTROGEN RECEPTOR POSITIVE (HCC): Primary | ICD-10-CM

## 2024-08-14 DIAGNOSIS — Z17.0 BILATERAL MALIGNANT NEOPLASM OF BREAST IN FEMALE, ESTROGEN RECEPTOR POSITIVE, UNSPECIFIED SITE OF BREAST (HCC): Primary | ICD-10-CM

## 2024-08-14 DIAGNOSIS — C77.3 CARCINOMA OF LEFT BREAST METASTATIC TO AXILLARY LYMPH NODE (HCC): ICD-10-CM

## 2024-08-14 DIAGNOSIS — Z17.0 BILATERAL MALIGNANT NEOPLASM OF BREAST IN FEMALE, ESTROGEN RECEPTOR POSITIVE, UNSPECIFIED SITE OF BREAST (HCC): ICD-10-CM

## 2024-08-14 DIAGNOSIS — C50.911 BILATERAL MALIGNANT NEOPLASM OF BREAST IN FEMALE, ESTROGEN RECEPTOR POSITIVE, UNSPECIFIED SITE OF BREAST (HCC): Primary | ICD-10-CM

## 2024-08-14 LAB
ALBUMIN SERPL-MCNC: 4 GM/DL (ref 3.4–5)
ALP BLD-CCNC: 96 IU/L (ref 40–129)
ALT SERPL-CCNC: 11 U/L (ref 10–40)
ANION GAP SERPL CALCULATED.3IONS-SCNC: 15 MMOL/L (ref 7–16)
AST SERPL-CCNC: 17 IU/L (ref 15–37)
BASOPHILS ABSOLUTE: 0 K/CU MM
BASOPHILS RELATIVE PERCENT: 0.6 % (ref 0–1)
BILIRUB SERPL-MCNC: 0.2 MG/DL (ref 0–1)
BUN SERPL-MCNC: 19 MG/DL (ref 6–23)
CALCIUM SERPL-MCNC: 9 MG/DL (ref 8.3–10.6)
CHLORIDE BLD-SCNC: 101 MMOL/L (ref 99–110)
CO2: 18 MMOL/L (ref 21–32)
CREAT SERPL-MCNC: 0.9 MG/DL (ref 0.6–1.1)
DIFFERENTIAL TYPE: ABNORMAL
EOSINOPHILS ABSOLUTE: 0.4 K/CU MM
EOSINOPHILS RELATIVE PERCENT: 5.5 % (ref 0–3)
GFR, ESTIMATED: 66 ML/MIN/1.73M2
GLUCOSE SERPL-MCNC: 92 MG/DL (ref 70–99)
HCT VFR BLD CALC: 42.3 % (ref 37–47)
HEMOGLOBIN: 14.1 GM/DL (ref 12.5–16)
LYMPHOCYTES ABSOLUTE: 1.9 K/CU MM
LYMPHOCYTES RELATIVE PERCENT: 27.1 % (ref 24–44)
MCH RBC QN AUTO: 30.3 PG (ref 27–31)
MCHC RBC AUTO-ENTMCNC: 33.3 % (ref 32–36)
MCV RBC AUTO: 91 FL (ref 78–100)
MONOCYTES ABSOLUTE: 0.5 K/CU MM
MONOCYTES RELATIVE PERCENT: 7.9 % (ref 0–4)
NEUTROPHILS ABSOLUTE: 4 K/CU MM
NEUTROPHILS RELATIVE PERCENT: 58.9 % (ref 36–66)
PDW BLD-RTO: 14.1 % (ref 11.7–14.9)
PLATELET # BLD: 253 K/CU MM (ref 140–440)
PMV BLD AUTO: 10.1 FL (ref 7.5–11.1)
POTASSIUM SERPL-SCNC: 4.8 MMOL/L (ref 3.5–5.1)
RBC # BLD: 4.65 M/CU MM (ref 4.2–5.4)
SODIUM BLD-SCNC: 134 MMOL/L (ref 135–145)
TOTAL PROTEIN: 7.2 GM/DL (ref 6.4–8.2)
WBC # BLD: 6.9 K/CU MM (ref 4–10.5)

## 2024-08-14 PROCEDURE — 80053 COMPREHEN METABOLIC PANEL: CPT

## 2024-08-14 PROCEDURE — 1090F PRES/ABSN URINE INCON ASSESS: CPT | Performed by: INTERNAL MEDICINE

## 2024-08-14 PROCEDURE — 99202 OFFICE O/P NEW SF 15 MIN: CPT | Performed by: RADIOLOGY

## 2024-08-14 PROCEDURE — G8419 CALC BMI OUT NRM PARAM NOF/U: HCPCS | Performed by: INTERNAL MEDICINE

## 2024-08-14 PROCEDURE — 1124F ACP DISCUSS-NO DSCNMKR DOCD: CPT | Performed by: INTERNAL MEDICINE

## 2024-08-14 PROCEDURE — G8400 PT W/DXA NO RESULTS DOC: HCPCS | Performed by: INTERNAL MEDICINE

## 2024-08-14 PROCEDURE — G8427 DOCREV CUR MEDS BY ELIG CLIN: HCPCS | Performed by: INTERNAL MEDICINE

## 2024-08-14 PROCEDURE — 3077F SYST BP >= 140 MM HG: CPT | Performed by: INTERNAL MEDICINE

## 2024-08-14 PROCEDURE — 99205 OFFICE O/P NEW HI 60 MIN: CPT | Performed by: RADIOLOGY

## 2024-08-14 PROCEDURE — 3079F DIAST BP 80-89 MM HG: CPT | Performed by: INTERNAL MEDICINE

## 2024-08-14 PROCEDURE — 85025 COMPLETE CBC W/AUTO DIFF WBC: CPT

## 2024-08-14 PROCEDURE — 99205 OFFICE O/P NEW HI 60 MIN: CPT | Performed by: INTERNAL MEDICINE

## 2024-08-14 PROCEDURE — 36415 COLL VENOUS BLD VENIPUNCTURE: CPT

## 2024-08-14 PROCEDURE — 1036F TOBACCO NON-USER: CPT | Performed by: INTERNAL MEDICINE

## 2024-08-14 ASSESSMENT — PATIENT HEALTH QUESTIONNAIRE - PHQ9
SUM OF ALL RESPONSES TO PHQ QUESTIONS 1-9: 0
SUM OF ALL RESPONSES TO PHQ QUESTIONS 1-9: 0
2. FEELING DOWN, DEPRESSED OR HOPELESS: NOT AT ALL
SUM OF ALL RESPONSES TO PHQ QUESTIONS 1-9: 0
SUM OF ALL RESPONSES TO PHQ9 QUESTIONS 1 & 2: 0
SUM OF ALL RESPONSES TO PHQ QUESTIONS 1-9: 0
1. LITTLE INTEREST OR PLEASURE IN DOING THINGS: NOT AT ALL

## 2024-08-14 NOTE — PROGRESS NOTES
Name:  Dilcia Buitrago  :  1945  MRN:  [id number]    ONCOLOGY NAVIGATION VISIT SUMMARY NOTE    Contact Type:  [facility]    Patient underwent bilateral breast biopsies and of the left axilla lymph node on 24 at [facility].    Left Breast Cancer Pathology:  -left breast IDC   -ER positive (>95%)  -ND positive (>95%)  -HER-2/fabrice equivocal by IHC (score 2+)  -HER-2/fabrice pending by FISH  -1 cm in size on imaging  -Grade 1   -Ki-67 - 20%  -One lymph node positive on biopsy in left axilla.  TNM classification:  [id number]  Stage:  clinical stage II    Right Breast Cancer Pathology:  -right breast ILC   -ER positive (>95%)  -ND negative (<1%)  -HER-2/fabrice equivocal by IHC (score 2+)  -HER-2/fabrice pending by FISH  -0.4 cm in size on imaging  -Grade 1   -Ki-67 - 20%  TNM classification:  [id number]  Stage:  clinical stage I    Oncology Team:    Surgeon:  Dr. Magali Paulino  Plastic/Reconstructive Surgeon:  CAMERON  Medical Oncologist:  Dr. Alirio Napoles  Radiation Oncologist:  Dr. Jimmy Ferrell  Genetic Counselor:  [facility]  Nurse Navigator:  VIRGINIA Arroyo, RN, -      INTERVENTIONS -     Education/Screenings:  New Patient Breast Cancer Information Sheet, Breast Cancer Handbook and Nurse Navigator contact information given to patient.  Oncotype DX testing and aromatase inhibitor information also given to patient.  Patient given a copy of the NCCN guidelines explaining their recommended treatment plan below.    Additional Testing:  labs today (CBC, CMP), genetic testing/counseling and PET/CT (scheduled for 24).  MRI of breasts scheduled for Thursday, 8/15/24.    Surgery:  Consultation with Dr. Paulino next Tuesday, 24.    Referrals:  Not at this time.    Notes:  Patient and sister-in-law met with Dr. Napoles and Dr. Ferrell in the [facility] today.  Dr. Napoles discussed with patient about possible Oncotype DX testing on tumor after surgery to assess patient risk factor of distant

## 2024-08-14 NOTE — PROGRESS NOTES
Dilcia ROJO Iftikhar  8/14/2024    CONSULT     Vitals:    08/14/24 1445   BP: (!) 143/85   Pulse: 97   SpO2: 94%        Oxygen Therapy  SpO2: 94 %  Pulse Oximeter Device Location: Finger  O2 Device: None (Room air)  Skin Assessment: Clean, dry, & intact  Oxygen Therapy: None (Room air)    Wt Readings from Last 3 Encounters:   07/15/21 91.6 kg (202 lb)   05/16/20 79.2 kg (174 lb 11.2 oz)   10/23/19 77.1 kg (170 lb)        Pain Assessment  Pain Assessment: None - Denies Pain  Denies Need for Intervention    Fall Risk:    Fall risk  Uses Walker    Nutritional Alteration:  Body mass index is 34.67 kg/m².    Any recent Weight Loss? yes -   If yes, how many LBS -  6  Over how many weeks, months - 4 WEEKS  Are you on a special diet? no  Are you eating/drinking any Protein or nutritional supplements daily? no  Pt c/o Loss of Appetite.  Mild Dysphagia  Voices Sufficient Oral Intake    Mediport: no    Pacemaker/ICD: no    Implants: no    Previous XRT: yes, 2010 HERE FOR BREAST    Assessment: Patient ambulates in into clinic with walker; accompanied by Sister-In-Law June Villalpando who is POA and will make healthcare decisions if at any point patient becomes unable to do so. Patient has no form of implants.      Past Medical History:   Diagnosis Date    Arthritis     Cancer (HCC)     Hyperlipidemia        Past Surgical History:   Procedure Laterality Date    BREAST SURGERY      CHOLECYSTECTOMY, LAPAROSCOPIC N/A 9/28/2019    CHOLECYSTECTOMY LAPAROSCOPIC performed by Magali Paulino MD at [facility] OR    COLONOSCOPY N/A 5/16/2020    COLONOSCOPY DIAGNOSTIC performed by Marcos Hill MD at [facility] ENDOSCOPY    COLONOSCOPY N/A 7/15/2021    COLONOSCOPY POLYPECTOMY SNARE/COLD BIOPSY performed by Marcos Hill MD at [facility] ENDOSCOPY    TONSILLECTOMY      TUBAL LIGATION      UPPER GASTROINTESTINAL ENDOSCOPY N/A 5/14/2020    EGD BIOPSY performed by Marcos Hill MD at [facility] ENDOSCOPY    UPPER GASTROINTESTINAL ENDOSCOPY N/A 7/15/2021    EGD BIOPSY

## 2024-08-14 NOTE — PROGRESS NOTES
MA Rooming Questions  Patient: Dilcia Buitrago  MRN: [id number]    Date: 8/14/2024        NEW PATIENT    5. Did the patient have a depression screening completed today? Yes    PHQ-9 Total Score: 0 (8/14/2024  2:44 PM)       PHQ-9 Given to (if applicable):               PHQ-9 Score (if applicable):                     [] Positive     []  Negative              Does question #9 need addressed (if applicable)                     [] Yes    []  No               Lauren Ochoa, [facility]    
Ultrasound showed, in the 1 o'clock axis, 9 cm from the nipple there is a patch of normal fibroglandular tissue, measures 1.2 x 2.2 x 0.4 cm. In the 2:30 axis, 9 cm from the nipple, there was an irregular hypoechoic mass with minimal internal vascularity, measured 0.7 x 1.0 x 1.0 cm.  Ultrasound of the left axilla showed an abnormal appearing lymph node with hilar replacement. This lymph node measured 2.1 x 1.9 x 1.9 cm.     She underwent ultrasound guided biopsy of bilateral breast masses and left axilla lymph node with clip placement on 8/5/2024.       Pathology from right breast lesion revealed invasive lobular carcinoma, grade 1, 5 mm in size, LCIS - present, Ki-67 ~ 20%, ER - positive (95%), CA - negative (< 1%) and HER2 by IHC - equivocal (score 2+), HER2 by FISH - pending.     Pathology from the left breast mass returned as invasive ductal carcinoma, grade 1, 7 mm in size, Ki-67 ~ 20%, ER - positive (95%), CA - positive (95%), HER2 by IHC - equivocal (score 2+), HER2 by FISH - pending.  Biopsy from the left axillary lymph node showed carcinoma consistent with a breast primary.  There is no definitive lymph node seen in the current biopsy, and the current sample was thought to represent a portion of lymph node completely replaced by metastasis.    She was referred to us for further management. She has an appointment to see Dr. Paulino on 8/20/24. She is going to have MRI breasts on 8/15/24.     Family history is significant for breast cancer in her paternal grandmother (diagnosed in her 50's). No other family history of breast cancer. No family history of ovarian, pancreatic or prostate cancer.     She has 2 sisters, 1 brother, 2 sons, 1 daughter and 3 grand children. She has lost about 6 - 10 lbs over last 1 month.      She doesn't have any other significant symptoms on today visit.      Past Medical History:     Past Medical History:   Diagnosis Date    Arthritis     Cancer (HCC)     Hyperlipidemia

## 2024-08-15 ENCOUNTER — TELEPHONE (OUTPATIENT)
Dept: ONCOLOGY | Age: 79
End: 2024-08-15

## 2024-08-15 NOTE — TELEPHONE ENCOUNTER
8/15/24 - called pt and she asked me to call her sister in law Hope Villalpando with the appointment information.  I spoke w/ Hope for the 8/28/24 Pet scan at [facility] arrival time of 1:00 pm for a 1:30 appt and NPO 6 hours prior - plain water only.

## 2024-08-22 DIAGNOSIS — Z17.0 MALIGNANT NEOPLASM OF UPPER-OUTER QUADRANT OF LEFT BREAST IN FEMALE, ESTROGEN RECEPTOR POSITIVE (HCC): ICD-10-CM

## 2024-08-22 DIAGNOSIS — Z17.0 MALIGNANT NEOPLASM OF LOWER-INNER QUADRANT OF RIGHT BREAST OF FEMALE, ESTROGEN RECEPTOR POSITIVE (HCC): Primary | ICD-10-CM

## 2024-08-22 DIAGNOSIS — C50.412 MALIGNANT NEOPLASM OF UPPER-OUTER QUADRANT OF LEFT BREAST IN FEMALE, ESTROGEN RECEPTOR POSITIVE (HCC): ICD-10-CM

## 2024-08-22 DIAGNOSIS — C50.311 MALIGNANT NEOPLASM OF LOWER-INNER QUADRANT OF RIGHT BREAST OF FEMALE, ESTROGEN RECEPTOR POSITIVE (HCC): Primary | ICD-10-CM

## 2024-08-28 ENCOUNTER — HOSPITAL ENCOUNTER (OUTPATIENT)
Dept: PET IMAGING | Age: 79
Discharge: HOME | End: 2024-08-28
Attending: INTERNAL MEDICINE
Payer: MEDICARE

## 2024-08-28 DIAGNOSIS — R63.4 WEIGHT LOSS: ICD-10-CM

## 2024-08-28 DIAGNOSIS — C77.3 CARCINOMA OF LEFT BREAST METASTATIC TO AXILLARY LYMPH NODE (HCC): ICD-10-CM

## 2024-08-28 DIAGNOSIS — C50.912 CARCINOMA OF LEFT BREAST METASTATIC TO AXILLARY LYMPH NODE (HCC): ICD-10-CM

## 2024-08-28 DIAGNOSIS — C50.912 BILATERAL MALIGNANT NEOPLASM OF BREAST IN FEMALE, ESTROGEN RECEPTOR POSITIVE, UNSPECIFIED SITE OF BREAST (HCC): ICD-10-CM

## 2024-08-28 DIAGNOSIS — C50.911 BILATERAL MALIGNANT NEOPLASM OF BREAST IN FEMALE, ESTROGEN RECEPTOR POSITIVE, UNSPECIFIED SITE OF BREAST (HCC): ICD-10-CM

## 2024-08-28 DIAGNOSIS — Z17.0 BILATERAL MALIGNANT NEOPLASM OF BREAST IN FEMALE, ESTROGEN RECEPTOR POSITIVE, UNSPECIFIED SITE OF BREAST (HCC): ICD-10-CM

## 2024-08-28 PROCEDURE — A9609 HC RX DIAGNOSTIC RADIOPHARMACEUTICAL: HCPCS | Performed by: INTERNAL MEDICINE

## 2024-08-28 PROCEDURE — 2580000003 HC RX 258: Performed by: INTERNAL MEDICINE

## 2024-08-28 PROCEDURE — 3430000000 HC RX DIAGNOSTIC RADIOPHARMACEUTICAL: Performed by: INTERNAL MEDICINE

## 2024-08-28 PROCEDURE — 78815 PET IMAGE W/CT SKULL-THIGH: CPT

## 2024-08-28 RX ORDER — FLUDEOXYGLUCOSE F 18 200 MCI/ML
14.98 INJECTION, SOLUTION INTRAVENOUS
Status: COMPLETED | OUTPATIENT
Start: 2024-08-28 | End: 2024-08-28

## 2024-08-28 RX ORDER — SODIUM CHLORIDE 0.9 % (FLUSH) 0.9 %
10 SYRINGE (ML) INJECTION PRN
Status: COMPLETED | OUTPATIENT
Start: 2024-08-28 | End: 2024-08-28

## 2024-08-28 RX ADMIN — SODIUM CHLORIDE, PRESERVATIVE FREE 10 ML: 5 INJECTION INTRAVENOUS at 13:19

## 2024-08-28 RX ADMIN — FLUDEOXYGLUCOSE F 18 14.98 MILLICURIE: 200 INJECTION, SOLUTION INTRAVENOUS at 13:19

## 2024-09-04 LAB — TEMPUS PORTAL: NORMAL

## 2024-09-05 ENCOUNTER — CLINICAL DOCUMENTATION (OUTPATIENT)
Dept: ONCOLOGY | Age: 79
End: 2024-09-05

## 2024-09-05 NOTE — PROGRESS NOTES
Called patient with NEGATIVE genetic testing results.  Patient voiced understanding.  This RN notified Dr. Napoles and Dr. Paulino.  Patient is scheduled for surgery (bilateral mastectomies) at [facility] on 9/23/24.  Copy of genetic results sent to patient via mail.

## 2024-09-09 ENCOUNTER — TELEPHONE (OUTPATIENT)
Dept: RADIATION ONCOLOGY | Age: 79
End: 2024-09-09

## 2024-10-09 PROBLEM — Z51.89 VISIT FOR WOUND CHECK: Status: ACTIVE | Noted: 2024-10-09

## 2024-10-09 PROBLEM — T81.89XA SURGICAL WOUND, NON HEALING: Status: ACTIVE | Noted: 2024-10-09

## 2024-10-09 PROBLEM — L98.492 ULCER OF CHEST WALL WITH FAT LAYER EXPOSED (HCC): Status: ACTIVE | Noted: 2024-10-09

## 2024-10-09 PROBLEM — G89.18 POST-OP PAIN: Status: ACTIVE | Noted: 2024-10-09

## 2024-11-02 NOTE — PROGRESS NOTES
Patient Name:  Dilcia Buitrago  Patient :  1945  Patient MRN:  [id number]     Primary Oncologist: Alirio Napoles MD  Referring Provider: Suha Pinzon MD     Date of Service: 2024      Reason for Consult: To evaluate the patient with right breast invasive lobular carcinoma and left breast invasive ductal carcinoma.      Chief Complaint:    Chief Complaint   Patient presents with    Follow-up     Patient Active Problem List:     History of left breast cancer     Iron deficiency anemia     Essential hypertension, benign     Type 2 diabetes mellitus without complication (HCC)     Pure hypercholesterolemia     Atrial fibrillation with rapid ventricular response (HCC)     Malignant neoplasm of upper-outer quadrant of left breast in female, estrogen receptor positive (HCC)     Malignant neoplasm of lower-inner quadrant of right breast of female, estrogen receptor positive (HCC)    HPI:   Dilcia Buitrago is a 79 y.o. female with medical history significant for HTN, hyperlipidemia, T2DM, iron deficiency anemia, atrial fibrillation and history of early left breast cancer, diagnosed around , s/p left breast lumpectomy, adjuvant radiation therapy and 5 years of adjuvant endocrine therapy, referred to me on 24 for evaluation of bilateral breast cancer.     She had screening mammogram on 24 and it showed right breast mass and left breast focal asymmetry.     Subsequently had a diagnostic mammogram and ultrasound of both breasts on 2024.  Right breast mammogram showed the right breast mass at 5:00 correlating with initially recalled mammographic mass. It was 0.6 cm in size and in the lower central right breast 9 cm from the nipple. Ultrasound showed an irregular hypoechoic mass, measures 0.4 x 0.4 x 0.3 cm. No suspicious sonographic abnormality in right axilla. Recommend ultrasound guided biopsy.      Left breast mammogram showed 1 cm focal asymmetry in the upper outer left breast, 10 cm from the nipple.  Oral Minoxidil Pregnancy And Lactation Text: This medication should only be used when clearly needed if you are pregnant, attempting to become pregnant or breast feeding.

## 2024-11-11 ENCOUNTER — HOSPITAL ENCOUNTER (OUTPATIENT)
Dept: RADIATION ONCOLOGY | Age: 79
Discharge: HOME | End: 2024-11-11
Payer: MEDICARE

## 2024-11-11 ENCOUNTER — OFFICE VISIT (OUTPATIENT)
Dept: ONCOLOGY | Age: 79
End: 2024-11-11
Payer: MEDICARE

## 2024-11-11 VITALS
WEIGHT: 159 LBS | BODY MASS INDEX: 27.14 KG/M2 | OXYGEN SATURATION: 98 % | TEMPERATURE: 97.3 F | SYSTOLIC BLOOD PRESSURE: 169 MMHG | HEART RATE: 96 BPM | RESPIRATION RATE: 16 BRPM | DIASTOLIC BLOOD PRESSURE: 81 MMHG | HEIGHT: 64 IN

## 2024-11-11 VITALS
DIASTOLIC BLOOD PRESSURE: 81 MMHG | RESPIRATION RATE: 16 BRPM | HEART RATE: 96 BPM | SYSTOLIC BLOOD PRESSURE: 169 MMHG | OXYGEN SATURATION: 98 % | WEIGHT: 159.2 LBS | BODY MASS INDEX: 27.18 KG/M2 | HEIGHT: 64 IN | TEMPERATURE: 97.3 F

## 2024-11-11 DIAGNOSIS — C50.412 MALIGNANT NEOPLASM OF UPPER-OUTER QUADRANT OF LEFT BREAST IN FEMALE, ESTROGEN RECEPTOR POSITIVE (HCC): Primary | ICD-10-CM

## 2024-11-11 DIAGNOSIS — Z17.0 MALIGNANT NEOPLASM OF UPPER-OUTER QUADRANT OF LEFT BREAST IN FEMALE, ESTROGEN RECEPTOR POSITIVE (HCC): Primary | ICD-10-CM

## 2024-11-11 PROCEDURE — G8419 CALC BMI OUT NRM PARAM NOF/U: HCPCS | Performed by: INTERNAL MEDICINE

## 2024-11-11 PROCEDURE — 1125F AMNT PAIN NOTED PAIN PRSNT: CPT | Performed by: INTERNAL MEDICINE

## 2024-11-11 PROCEDURE — 99212 OFFICE O/P EST SF 10 MIN: CPT | Performed by: RADIOLOGY

## 2024-11-11 PROCEDURE — G8400 PT W/DXA NO RESULTS DOC: HCPCS | Performed by: INTERNAL MEDICINE

## 2024-11-11 PROCEDURE — 3077F SYST BP >= 140 MM HG: CPT | Performed by: INTERNAL MEDICINE

## 2024-11-11 PROCEDURE — 1124F ACP DISCUSS-NO DSCNMKR DOCD: CPT | Performed by: INTERNAL MEDICINE

## 2024-11-11 PROCEDURE — 99214 OFFICE O/P EST MOD 30 MIN: CPT | Performed by: INTERNAL MEDICINE

## 2024-11-11 PROCEDURE — 99214 OFFICE O/P EST MOD 30 MIN: CPT | Performed by: RADIOLOGY

## 2024-11-11 PROCEDURE — G8427 DOCREV CUR MEDS BY ELIG CLIN: HCPCS | Performed by: INTERNAL MEDICINE

## 2024-11-11 PROCEDURE — 1090F PRES/ABSN URINE INCON ASSESS: CPT | Performed by: INTERNAL MEDICINE

## 2024-11-11 PROCEDURE — 1036F TOBACCO NON-USER: CPT | Performed by: INTERNAL MEDICINE

## 2024-11-11 PROCEDURE — G8484 FLU IMMUNIZE NO ADMIN: HCPCS | Performed by: INTERNAL MEDICINE

## 2024-11-11 PROCEDURE — 1159F MED LIST DOCD IN RCRD: CPT | Performed by: INTERNAL MEDICINE

## 2024-11-11 PROCEDURE — 3079F DIAST BP 80-89 MM HG: CPT | Performed by: INTERNAL MEDICINE

## 2024-11-11 RX ORDER — COVID-19 ANTIGEN TEST
KIT MISCELLANEOUS
COMMUNITY
Start: 2024-09-17

## 2024-11-11 RX ORDER — LISINOPRIL 20 MG/1
20 TABLET ORAL DAILY
COMMUNITY
Start: 2024-08-16

## 2024-11-11 RX ORDER — LETROZOLE 2.5 MG/1
2.5 TABLET, FILM COATED ORAL DAILY
Qty: 30 TABLET | Refills: 3 | Status: SHIPPED | OUTPATIENT
Start: 2024-11-11

## 2024-11-11 NOTE — PROGRESS NOTES
[facility]   Radiation Oncology Center  [address] [place] [id number]  Phone: [phone]  Fax: [phone]    RADIATION ONCOLOGY FOLLOW UP REPORT    PATIENT NAME:  Dilcia Buitrago              : 1945  MEDICAL RECORD NO: [id number]    [facility] NO: [id number]        PROVIDER: Jimmy Ferrell MD    DATE OF SERVICE: 2024     FOLLOW UP PHYSICIANS:  Dr. Napoles    DIAGNOSIS:  Early stage L breast cancer 2009  bilateral synchronous breast cancer R breast ILC G1 [id number] ki67 20% ER 95% WY <1% her2 equivocal by IHC FISH pending & L breast IDC G1 cT1cN1(f)M0 ki67 20% ER 95% WY 95% her2 equivocal by IHC FISH unamplified; R breast ILC pT1bN0(sn) & L breast IDC [id number] (+judith)     TREATMENT HISTORY:  L breast BCT followed by adjuvant radiation therapy 2009  Bilateral mastectomies and ALND on the L 24  Planned adjuvant radiation therapy on the L  Planned adjuvant endocrine therapy    HPI:   Dilcia Buitrago is a 79 y.o. female  who has a history as above, who returns today for a routine follow-up visit.       She had a PET/CT on 2024 that showed an enlarged hypermetabolic left axillary lymph node consistent with metastatic breast cancer.  No evidence of distant disease.     She underwent bilateral mastectomies and sentinel lymph node on the right and axillary lymph node dissection on the left on 2024.     Pathology revealed residual invasive lobular carcinoma on the right side, 0 of 2 lymph nodes, grade 2, LCIS was present, 6 mm in size, negative margins, making final surg path on the right side pT1bN0(sn).       pathology from the left side revealed invasive ductal carcinoma, 8 mm in size, grade 1, negative margins, LVSI was indeterminate, 2/12 lymph nodes the largest being 3.0 cm and JUDITH was present, making final surgical pathology on the left side [id number].      She was seen by surgery on 10/9/2024 and had wound healing issues on the left side.  Drains were removed.  But

## 2024-11-11 NOTE — PROGRESS NOTES
MA Rooming Questions  Patient: Dilcia Buitrago  MRN: [id number]    Date: 11/11/2024        1. Do you have any new issues?   no         2. Do you need any refills on medications?    no    3. Have you had any imaging done since your last visit?   no    4. Have you been hospitalized or seen in the emergency room since your last visit here?   no    5. Did the patient have a depression screening completed today? No    No data recorded     PHQ-9 Given to (if applicable):               PHQ-9 Score (if applicable):                     [] Positive     []  Negative              Does question #9 need addressed (if applicable)                     [] Yes    []  No               Charity Valladares CMA

## 2024-12-19 ENCOUNTER — OFFICE VISIT (OUTPATIENT)
Dept: ONCOLOGY | Age: 79
End: 2024-12-19
Payer: MEDICARE

## 2024-12-19 ENCOUNTER — HOSPITAL ENCOUNTER (OUTPATIENT)
Dept: RADIATION ONCOLOGY | Age: 79
Discharge: HOME | End: 2024-12-19

## 2024-12-19 ENCOUNTER — HOSPITAL ENCOUNTER (OUTPATIENT)
Dept: INFUSION THERAPY | Age: 79
Discharge: HOME | End: 2024-12-19
Payer: MEDICARE

## 2024-12-19 VITALS
OXYGEN SATURATION: 100 % | HEIGHT: 64 IN | SYSTOLIC BLOOD PRESSURE: 160 MMHG | DIASTOLIC BLOOD PRESSURE: 88 MMHG | WEIGHT: 155 LBS | BODY MASS INDEX: 26.46 KG/M2 | HEART RATE: 89 BPM | TEMPERATURE: 97.8 F

## 2024-12-19 VITALS
HEART RATE: 89 BPM | BODY MASS INDEX: 26.46 KG/M2 | OXYGEN SATURATION: 100 % | TEMPERATURE: 97.8 F | SYSTOLIC BLOOD PRESSURE: 160 MMHG | WEIGHT: 155 LBS | HEIGHT: 64 IN | DIASTOLIC BLOOD PRESSURE: 88 MMHG

## 2024-12-19 DIAGNOSIS — Z17.0 MALIGNANT NEOPLASM OF UPPER-OUTER QUADRANT OF LEFT BREAST IN FEMALE, ESTROGEN RECEPTOR POSITIVE (HCC): Primary | ICD-10-CM

## 2024-12-19 DIAGNOSIS — C50.412 MALIGNANT NEOPLASM OF UPPER-OUTER QUADRANT OF LEFT BREAST IN FEMALE, ESTROGEN RECEPTOR POSITIVE (HCC): Primary | ICD-10-CM

## 2024-12-19 PROCEDURE — 99211 OFF/OP EST MAY X REQ PHY/QHP: CPT

## 2024-12-19 PROCEDURE — 3077F SYST BP >= 140 MM HG: CPT | Performed by: INTERNAL MEDICINE

## 2024-12-19 PROCEDURE — 1125F AMNT PAIN NOTED PAIN PRSNT: CPT | Performed by: INTERNAL MEDICINE

## 2024-12-19 PROCEDURE — 1124F ACP DISCUSS-NO DSCNMKR DOCD: CPT | Performed by: INTERNAL MEDICINE

## 2024-12-19 PROCEDURE — 99214 OFFICE O/P EST MOD 30 MIN: CPT | Performed by: INTERNAL MEDICINE

## 2024-12-19 PROCEDURE — 1159F MED LIST DOCD IN RCRD: CPT | Performed by: INTERNAL MEDICINE

## 2024-12-19 PROCEDURE — 3079F DIAST BP 80-89 MM HG: CPT | Performed by: INTERNAL MEDICINE

## 2024-12-19 NOTE — PROGRESS NOTES
MA Rooming Questions  Patient: Dilcia Buitrago  MRN: [id number]    Date: 12/19/2024        1. Do you have any new issues?   yes - NO APPETITE .  Wt Readings from Last 3 Encounters:   12/04/24 72.1 kg (159 lb)   11/13/24 72.1 kg (159 lb)   11/11/24 72.1 kg (159 lb)             2. Do you need any refills on medications?    no    3. Have you had any imaging done since your last visit?   no    4. Have you been hospitalized or seen in the emergency room since your last visit here?   no    5. Did the patient have a depression screening completed today? No    No data recorded     PHQ-9 Given to (if applicable):               PHQ-9 Score (if applicable):                     [] Positive     []  Negative              Does question #9 need addressed (if applicable)                     [] Yes    []  No               Lauren Ochoa CMA

## 2024-12-19 NOTE — PLAN OF CARE
Radiation education and handouts given. Side effects and management reviewed with pt. All questions answered and pt voices understanding .    Pt scheduled to return to [facility] on 12/30/2024 at 10:00 AM for CT/SIM for radiation planning.     Nursing Care Plan for Breast Radiation    Pain related to cancer diagnosis and treatment.    Interventions   Pain assessment.   Monitor pharmacological pain medication.   Teach relaxation and repositioning techniques.     Expected Outcome   Maintain patient's acceptable pain level or <5 on pain scale.       Knowledge deficit related to diagnosis and treatment plan.    Interventions   Assess patient's ability to comprehend diagnosis and treatment plan.   Provide educational materials and teaching regarding plan of care.   Provide emotional support and continued education.   Refer to psychosocial coordinator if further assistance needed.     Expected Outcome   Patient voices understanding of diagnosis and treatment plan.       Altered respiratory status related to diagnosis and treatment.    Interventions   Assess respiratory status, note changes.   Educate patient on symptoms to report to staff.   Refer to smoking cessation program if needed.     Expected Outcome   No respiratory complications, patient with SPO2 >90% or equal to baseline.       Altered skin integrity related to treatment.    Interventions   Evaluation of skin integrity at therapy site.   Advise patient on appropriate skin care.   Instruct patient on recommended lotions/ointments/creams/dressing changes to use on therapy site.     Expected Outcome   Minimize adverse skin reaction/infection at treatment site.       To re-evaluate weekly during radiation treatments.

## 2024-12-19 NOTE — PROGRESS NOTES
Patient Name:  Dilcia Buitrago  Patient :  1945  Patient MRN:  [id number]     Primary Oncologist: Alirio Napoles MD  Referring Provider: Suha Pinzon MD     Date of Service: 2024      Reason for Consult: To evaluate the patient with right breast invasive lobular carcinoma and left breast invasive ductal carcinoma.      Chief Complaint:    Chief Complaint   Patient presents with    Follow-up     Patient Active Problem List:     History of left breast cancer     Iron deficiency anemia     Essential hypertension, benign     Type 2 diabetes mellitus without complication (HCC)     Pure hypercholesterolemia     Atrial fibrillation with rapid ventricular response (HCC)     Malignant neoplasm of upper-outer quadrant of left breast in female, estrogen receptor positive (HCC)     Malignant neoplasm of lower-inner quadrant of right breast of female, estrogen receptor positive (HCC)    HPI:   Dilcia Buitrago is a 79 y.o. female with medical history significant for HTN, hyperlipidemia, T2DM, iron deficiency anemia, atrial fibrillation and history of early left breast cancer, diagnosed around , s/p left breast lumpectomy, adjuvant radiation therapy and 5 years of adjuvant endocrine therapy, referred to me on 24 for evaluation of bilateral breast cancer.     She had screening mammogram on 24 and it showed right breast mass and left breast focal asymmetry.     Subsequently had a diagnostic mammogram and ultrasound of both breasts on 2024.  Right breast mammogram showed the right breast mass at 5:00 correlating with initially recalled mammographic mass. It was 0.6 cm in size and in the lower central right breast 9 cm from the nipple. Ultrasound showed an irregular hypoechoic mass, measures 0.4 x 0.4 x 0.3 cm. No suspicious sonographic abnormality in right axilla. Recommend ultrasound guided biopsy.      Left breast mammogram showed 1 cm focal asymmetry in the upper outer left breast, 10 cm from the nipple.

## 2024-12-19 NOTE — PROGRESS NOTES
Dilcia Buitrago  12/19/2024    Patient is seen today for follow up.     Vitals:    12/19/24 1057   BP: (!) 160/88   Pulse: 89   Temp: 97.8 °F (36.6 °C)   SpO2: 100%        Oxygen Therapy  SpO2: 100 %  Pulse Oximeter Device Location: Finger  O2 Device: None (Room air)  Skin Assessment: Clean, dry, & intact  Oxygen Therapy: None (Room air)    Wt Readings from Last 3 Encounters:   12/19/24 70.3 kg (155 lb)   12/19/24 70.3 kg (155 lb)   12/04/24 72.1 kg (159 lb)       Pain Assessment  Pain Assessment: None - Denies Pain  Denies Need for Intervention       Allergies   Allergen Reactions    Seasonal Other (See Comments)     coughing        Current Outpatient Medications   Medication Sig Dispense Refill    lisinopril (PRINIVIL;ZESTRIL) 20 MG tablet Take 1 tablet by mouth daily      Naproxen Sodium 220 MG CAPS Take by mouth      letrozole (FEMARA) 2.5 MG tablet Take 1 tablet by mouth daily 30 tablet 3    levothyroxine (SYNTHROID) 75 MCG tablet Take 1 tablet by mouth Daily      glimepiride (AMARYL) 1 MG tablet Take 1 tablet by mouth every morning (before breakfast)      pantoprazole (PROTONIX) 40 MG tablet Take 1 tablet by mouth 2 times daily (before meals) 60 tablet 0    ferrous sulfate 325 (65 Fe) MG tablet Take 1 tablet by mouth daily (with breakfast)       No current facility-administered medications for this encounter.        Additional Comments: Patient states she has no appetite. She also reports pain in her left breast area.     Electronically signed by Lauren Ochoa CMA on 12/19/2024 at 10:59 AM             
09/24/2024 04:00 AM    GFRAA >60 07/16/2021 06:21 AM    AGRATIO 1.2 11/07/2016 09:24 AM    LABGLOM 66 08/14/2024 03:36 PM    GLUCOSE 142 09/24/2024 04:00 AM    CALCIUM 8.2 09/24/2024 04:00 AM    BILITOT 0.2 08/14/2024 03:36 PM    ALKPHOS 96 08/14/2024 03:36 PM    AST 17 08/14/2024 03:36 PM    ALT 11 08/14/2024 03:36 PM     Onc labs: No results found for: \"PSA\", \"CEA\", \"LDH\", \"AFP\"    MEDICATIONS:   Current Outpatient Medications   Medication Sig Dispense Refill    lisinopril (PRINIVIL;ZESTRIL) 20 MG tablet Take 1 tablet by mouth daily      Naproxen Sodium 220 MG CAPS Take by mouth      letrozole (FEMARA) 2.5 MG tablet Take 1 tablet by mouth daily 30 tablet 3    levothyroxine (SYNTHROID) 75 MCG tablet Take 1 tablet by mouth Daily      glimepiride (AMARYL) 1 MG tablet Take 1 tablet by mouth every morning (before breakfast)      pantoprazole (PROTONIX) 40 MG tablet Take 1 tablet by mouth 2 times daily (before meals) 60 tablet 0    ferrous sulfate 325 (65 Fe) MG tablet Take 1 tablet by mouth daily (with breakfast)       No current facility-administered medications for this encounter.       EXAMINATION:   Vitals:    12/19/24 1057   BP: (!) 160/88   Pulse: 89   Temp: 97.8 °F (36.6 °C)   SpO2: 100%     The patient is in no acute distress.    ASSESSMENT AND PLAN:     Dilcia Buitrago is a 79 y.o. female with bilateral synchronous breast cancer R breast ILC G1 [id number] ki67 20% ER 95% AZ <1% her2 equivocal by IHC FISH pending & L breast IDC G1 cT1cN1(f)M0 ki67 20% ER 95% AZ 95% her2 equivocal by IHC FISH unamplified; R side ILC pT1bN0(sn) L side IDC [id number] (+ALAINA) sp bilateral mastectomy and SLNBx on R and ALND on L 9/23/24 who presents for follow up.     The wound is now healed.  She is having no new issues or problems.  She is on letrozole.  Recommendation for adjuvant radiation therapy remains the same.  She has had radiation therapy to the left breast back in 2009.  I will treat the lymph node regions only and leave the

## 2024-12-31 ENCOUNTER — TELEPHONE (OUTPATIENT)
Dept: RADIATION ONCOLOGY | Age: 79
End: 2024-12-31

## 2024-12-31 NOTE — TELEPHONE ENCOUNTER
Call placed to pt's sister as pt requested to follow up on cancelled simulation 12/30. Pt rescheduled for CT/SIM for radiation planning on 1/8/2025 at 2:00 PM. Pt offered planning for this week, pt's sister declined and stated it would need to be next week, so scheduled at her first available time. Dr. Ferrell notified.

## 2025-01-08 ENCOUNTER — HOSPITAL ENCOUNTER (OUTPATIENT)
Dept: RADIATION ONCOLOGY | Age: 80
Discharge: HOME | End: 2025-01-08
Payer: MEDICARE

## 2025-01-08 PROCEDURE — 77332 RADIATION TREATMENT AID(S): CPT | Performed by: RADIOLOGY

## 2025-01-08 PROCEDURE — 77470 SPECIAL RADIATION TREATMENT: CPT | Performed by: RADIOLOGY

## 2025-01-08 PROCEDURE — 77334 RADIATION TREATMENT AID(S): CPT | Performed by: RADIOLOGY

## 2025-01-16 ENCOUNTER — APPOINTMENT (OUTPATIENT)
Dept: RADIATION ONCOLOGY | Age: 80
End: 2025-01-16
Payer: MEDICARE

## 2025-01-17 ENCOUNTER — APPOINTMENT (OUTPATIENT)
Dept: RADIATION ONCOLOGY | Age: 80
End: 2025-01-17
Payer: MEDICARE

## 2025-01-20 ENCOUNTER — APPOINTMENT (OUTPATIENT)
Dept: RADIATION ONCOLOGY | Age: 80
End: 2025-01-20
Payer: MEDICARE

## 2025-01-21 ENCOUNTER — APPOINTMENT (OUTPATIENT)
Dept: RADIATION ONCOLOGY | Age: 80
End: 2025-01-21
Payer: MEDICARE

## 2025-01-22 ENCOUNTER — APPOINTMENT (OUTPATIENT)
Dept: RADIATION ONCOLOGY | Age: 80
End: 2025-01-22
Payer: MEDICARE

## 2025-01-23 ENCOUNTER — APPOINTMENT (OUTPATIENT)
Dept: RADIATION ONCOLOGY | Age: 80
End: 2025-01-23
Payer: MEDICARE

## 2025-01-24 ENCOUNTER — APPOINTMENT (OUTPATIENT)
Dept: RADIATION ONCOLOGY | Age: 80
End: 2025-01-24
Payer: MEDICARE

## 2025-01-27 ENCOUNTER — APPOINTMENT (OUTPATIENT)
Dept: RADIATION ONCOLOGY | Age: 80
End: 2025-01-27
Payer: MEDICARE

## 2025-01-28 ENCOUNTER — APPOINTMENT (OUTPATIENT)
Dept: RADIATION ONCOLOGY | Age: 80
End: 2025-01-28
Payer: MEDICARE

## 2025-01-29 ENCOUNTER — APPOINTMENT (OUTPATIENT)
Dept: RADIATION ONCOLOGY | Age: 80
End: 2025-01-29
Payer: MEDICARE

## 2025-01-30 ENCOUNTER — APPOINTMENT (OUTPATIENT)
Dept: RADIATION ONCOLOGY | Age: 80
End: 2025-01-30
Payer: MEDICARE

## 2025-01-31 ENCOUNTER — APPOINTMENT (OUTPATIENT)
Dept: RADIATION ONCOLOGY | Age: 80
End: 2025-01-31
Payer: MEDICARE

## 2025-02-03 ENCOUNTER — APPOINTMENT (OUTPATIENT)
Dept: RADIATION ONCOLOGY | Age: 80
End: 2025-02-03
Payer: MEDICARE

## 2025-02-04 ENCOUNTER — HOSPITAL ENCOUNTER (OUTPATIENT)
Dept: RADIATION ONCOLOGY | Age: 80
Discharge: HOME | End: 2025-02-04
Payer: MEDICARE

## 2025-02-04 ENCOUNTER — APPOINTMENT (OUTPATIENT)
Dept: RADIATION ONCOLOGY | Age: 80
End: 2025-02-04
Payer: MEDICARE

## 2025-02-04 VITALS — WEIGHT: 158.2 LBS | BODY MASS INDEX: 27.15 KG/M2

## 2025-02-04 PROCEDURE — 77385 HC NTSTY MODUL RAD TX DLVR SMPL: CPT | Performed by: RADIOLOGY

## 2025-02-04 ASSESSMENT — PAIN SCALES - GENERAL: PAINLEVEL_OUTOF10: 0

## 2025-02-04 NOTE — PROGRESS NOTES
Weekly Radiation Treatment Progress Note    DATE OF SERVICE: 2/4/2025     DIAGNOSIS: bilateral synchronous breast cancer R breast ILC G1 [id number] ki67 20% ER 95% CO <1% her2 equivocal by IHC FISH pending & L breast IDC G1 cT1cN1(f)M0 ki67 20% ER 95% CO 95% her2 equivocal by IHC FISH unamplified; R breast ILC pT1bN0(sn) & L breast IDC [id number] (+judith)     TREATMENT COURSE:     Site: L CW & LNs  Current Total Radiation Dose: 267 cGy  Fraction: 1/15    Pt doing well. Energy good.  Just started, no issues.     EXAM  Wt Readings from Last 3 Encounters:   12/19/24 70.3 kg (155 lb)   12/19/24 70.3 kg (155 lb)   12/04/24 72.1 kg (159 lb)     NAD   No desquamation    Setup images, chart, plan reviewed    A/P:   Tolerating RT well  Continue RT as planned      Electronically signed by Jimmy Ferrell MD on 2/4/2025 at 8:10 AM

## 2025-02-05 ENCOUNTER — HOSPITAL ENCOUNTER (OUTPATIENT)
Dept: RADIATION ONCOLOGY | Age: 80
Discharge: HOME | End: 2025-02-05
Payer: MEDICARE

## 2025-02-05 ENCOUNTER — APPOINTMENT (OUTPATIENT)
Dept: RADIATION ONCOLOGY | Age: 80
End: 2025-02-05
Payer: MEDICARE

## 2025-02-05 PROCEDURE — 77385 HC NTSTY MODUL RAD TX DLVR SMPL: CPT | Performed by: RADIOLOGY

## 2025-02-05 PROCEDURE — 77336 RADIATION PHYSICS CONSULT: CPT | Performed by: RADIOLOGY

## 2025-02-06 ENCOUNTER — HOSPITAL ENCOUNTER (OUTPATIENT)
Dept: RADIATION ONCOLOGY | Age: 80
Discharge: HOME | End: 2025-02-06
Payer: MEDICARE

## 2025-02-06 PROCEDURE — 77385 HC NTSTY MODUL RAD TX DLVR SMPL: CPT | Performed by: RADIOLOGY

## 2025-02-07 ENCOUNTER — HOSPITAL ENCOUNTER (OUTPATIENT)
Dept: RADIATION ONCOLOGY | Age: 80
Discharge: HOME | End: 2025-02-07
Payer: MEDICARE

## 2025-02-07 PROCEDURE — 77385 HC NTSTY MODUL RAD TX DLVR SMPL: CPT | Performed by: RADIOLOGY

## 2025-02-10 ENCOUNTER — HOSPITAL ENCOUNTER (OUTPATIENT)
Dept: RADIATION ONCOLOGY | Age: 80
Discharge: HOME | End: 2025-02-10
Payer: MEDICARE

## 2025-02-10 PROCEDURE — 77385 HC NTSTY MODUL RAD TX DLVR SMPL: CPT | Performed by: RADIOLOGY

## 2025-02-11 ENCOUNTER — HOSPITAL ENCOUNTER (OUTPATIENT)
Dept: RADIATION ONCOLOGY | Age: 80
Discharge: HOME | End: 2025-02-11
Payer: MEDICARE

## 2025-02-11 VITALS — BODY MASS INDEX: 27.12 KG/M2 | WEIGHT: 158 LBS

## 2025-02-11 PROCEDURE — 77385 HC NTSTY MODUL RAD TX DLVR SMPL: CPT | Performed by: RADIOLOGY

## 2025-02-11 ASSESSMENT — PAIN SCALES - GENERAL: PAINLEVEL_OUTOF10: 0

## 2025-02-11 NOTE — PROGRESS NOTES
Weekly Radiation Treatment Progress Note    DATE OF SERVICE: 2/11/2025     DIAGNOSIS:   bilateral synchronous breast cancer R breast ILC G1 [id number] ki67 20% ER 95% ND <1% her2 equivocal by IHC FISH pending & L breast IDC G1 cT1cN1(f)M0 ki67 20% ER 95% ND 95% her2 equivocal by IHC FISH unamplified; R breast ILC pT1bN0(sn) & L breast IDC [id number] (+judith)     TREATMENT COURSE:         Site: L CW & LNs   Current Total Radiation Dose: 1602 cGy  Fraction: 6/15    Pt doing well. Energy good.  No skin itching/soreness.      EXAM  Wt Readings from Last 3 Encounters:   02/04/25 71.8 kg (158 lb 3.2 oz)   12/19/24 70.3 kg (155 lb)   12/19/24 70.3 kg (155 lb)     NAD   No desquamation    Setup images, chart, plan reviewed    A/P:   Tolerating RT well  Continue RT as planned      Electronically signed by Jimmy Ferrell MD on 2/11/2025 at 7:54 AM

## 2025-02-12 ENCOUNTER — HOSPITAL ENCOUNTER (OUTPATIENT)
Dept: RADIATION ONCOLOGY | Age: 80
Discharge: HOME | End: 2025-02-12
Payer: MEDICARE

## 2025-02-12 PROCEDURE — 77336 RADIATION PHYSICS CONSULT: CPT | Performed by: RADIOLOGY

## 2025-02-12 PROCEDURE — 77385 HC NTSTY MODUL RAD TX DLVR SMPL: CPT | Performed by: RADIOLOGY

## 2025-02-13 ENCOUNTER — HOSPITAL ENCOUNTER (OUTPATIENT)
Dept: RADIATION ONCOLOGY | Age: 80
Discharge: HOME | End: 2025-02-13
Payer: MEDICARE

## 2025-02-13 PROCEDURE — 77385 HC NTSTY MODUL RAD TX DLVR SMPL: CPT | Performed by: RADIOLOGY

## 2025-02-14 ENCOUNTER — APPOINTMENT (OUTPATIENT)
Dept: RADIATION ONCOLOGY | Age: 80
End: 2025-02-14
Payer: MEDICARE

## 2025-02-17 ENCOUNTER — HOSPITAL ENCOUNTER (OUTPATIENT)
Dept: RADIATION ONCOLOGY | Age: 80
Discharge: HOME | End: 2025-02-17
Payer: MEDICARE

## 2025-02-17 PROCEDURE — 77385 HC NTSTY MODUL RAD TX DLVR SMPL: CPT | Performed by: RADIOLOGY

## 2025-02-18 ENCOUNTER — HOSPITAL ENCOUNTER (OUTPATIENT)
Dept: RADIATION ONCOLOGY | Age: 80
Discharge: HOME | End: 2025-02-18
Payer: MEDICARE

## 2025-02-18 PROCEDURE — 77385 HC NTSTY MODUL RAD TX DLVR SMPL: CPT | Performed by: RADIOLOGY

## 2025-02-19 ENCOUNTER — HOSPITAL ENCOUNTER (OUTPATIENT)
Dept: RADIATION ONCOLOGY | Age: 80
Discharge: HOME | End: 2025-02-19
Payer: MEDICARE

## 2025-02-19 PROCEDURE — 77385 HC NTSTY MODUL RAD TX DLVR SMPL: CPT | Performed by: RADIOLOGY

## 2025-02-20 ENCOUNTER — HOSPITAL ENCOUNTER (OUTPATIENT)
Dept: RADIATION ONCOLOGY | Age: 80
Discharge: HOME | End: 2025-02-20
Payer: MEDICARE

## 2025-02-20 PROCEDURE — 77336 RADIATION PHYSICS CONSULT: CPT | Performed by: RADIOLOGY

## 2025-02-20 PROCEDURE — 77385 HC NTSTY MODUL RAD TX DLVR SMPL: CPT | Performed by: RADIOLOGY

## 2025-02-21 ENCOUNTER — HOSPITAL ENCOUNTER (OUTPATIENT)
Dept: RADIATION ONCOLOGY | Age: 80
Discharge: HOME | End: 2025-02-21
Payer: MEDICARE

## 2025-02-21 PROCEDURE — 77385 HC NTSTY MODUL RAD TX DLVR SMPL: CPT | Performed by: RADIOLOGY

## 2025-02-24 ENCOUNTER — HOSPITAL ENCOUNTER (OUTPATIENT)
Dept: RADIATION ONCOLOGY | Age: 80
Discharge: HOME | End: 2025-02-24
Payer: MEDICARE

## 2025-02-24 PROCEDURE — 77385 HC NTSTY MODUL RAD TX DLVR SMPL: CPT | Performed by: RADIOLOGY

## 2025-02-24 PROCEDURE — 77014 CHG CT GUIDANCE RADIATION THERAPY FLDS PLACEMENT: CPT | Performed by: RADIOLOGY

## 2025-02-25 ENCOUNTER — HOSPITAL ENCOUNTER (OUTPATIENT)
Dept: INFUSION THERAPY | Age: 80
Discharge: HOME | End: 2025-02-25
Payer: MEDICARE

## 2025-02-25 ENCOUNTER — OFFICE VISIT (OUTPATIENT)
Dept: ONCOLOGY | Age: 80
End: 2025-02-25
Payer: MEDICARE

## 2025-02-25 ENCOUNTER — CLINICAL DOCUMENTATION (OUTPATIENT)
Dept: ONCOLOGY | Age: 80
End: 2025-02-25

## 2025-02-25 ENCOUNTER — HOSPITAL ENCOUNTER (OUTPATIENT)
Dept: RADIATION ONCOLOGY | Age: 80
Discharge: HOME | End: 2025-02-25
Payer: MEDICARE

## 2025-02-25 VITALS
HEART RATE: 90 BPM | WEIGHT: 157.8 LBS | BODY MASS INDEX: 26.94 KG/M2 | SYSTOLIC BLOOD PRESSURE: 134 MMHG | HEIGHT: 64 IN | TEMPERATURE: 97.3 F | OXYGEN SATURATION: 100 % | DIASTOLIC BLOOD PRESSURE: 85 MMHG

## 2025-02-25 VITALS — WEIGHT: 158 LBS | BODY MASS INDEX: 27.12 KG/M2

## 2025-02-25 DIAGNOSIS — C50.412 MALIGNANT NEOPLASM OF UPPER-OUTER QUADRANT OF LEFT BREAST IN FEMALE, ESTROGEN RECEPTOR POSITIVE (HCC): Primary | ICD-10-CM

## 2025-02-25 DIAGNOSIS — Z17.0 MALIGNANT NEOPLASM OF UPPER-OUTER QUADRANT OF LEFT BREAST IN FEMALE, ESTROGEN RECEPTOR POSITIVE (HCC): Primary | ICD-10-CM

## 2025-02-25 PROCEDURE — 77385 HC NTSTY MODUL RAD TX DLVR SMPL: CPT | Performed by: RADIOLOGY

## 2025-02-25 PROCEDURE — 1124F ACP DISCUSS-NO DSCNMKR DOCD: CPT | Performed by: INTERNAL MEDICINE

## 2025-02-25 PROCEDURE — 3075F SYST BP GE 130 - 139MM HG: CPT | Performed by: INTERNAL MEDICINE

## 2025-02-25 PROCEDURE — 99212 OFFICE O/P EST SF 10 MIN: CPT

## 2025-02-25 PROCEDURE — 1159F MED LIST DOCD IN RCRD: CPT | Performed by: INTERNAL MEDICINE

## 2025-02-25 PROCEDURE — 99215 OFFICE O/P EST HI 40 MIN: CPT | Performed by: INTERNAL MEDICINE

## 2025-02-25 PROCEDURE — 1126F AMNT PAIN NOTED NONE PRSNT: CPT | Performed by: INTERNAL MEDICINE

## 2025-02-25 PROCEDURE — 3079F DIAST BP 80-89 MM HG: CPT | Performed by: INTERNAL MEDICINE

## 2025-02-25 RX ORDER — ABEMACICLIB 50 MG/1
50 TABLET ORAL 2 TIMES DAILY
Qty: 60 TABLET | Refills: 11 | Status: ON HOLD | OUTPATIENT
Start: 2025-02-25

## 2025-02-25 ASSESSMENT — PATIENT HEALTH QUESTIONNAIRE - PHQ9
SUM OF ALL RESPONSES TO PHQ QUESTIONS 1-9: 0
SUM OF ALL RESPONSES TO PHQ QUESTIONS 1-9: 0
2. FEELING DOWN, DEPRESSED OR HOPELESS: NOT AT ALL
SUM OF ALL RESPONSES TO PHQ QUESTIONS 1-9: 0
SUM OF ALL RESPONSES TO PHQ9 QUESTIONS 1 & 2: 0
1. LITTLE INTEREST OR PLEASURE IN DOING THINGS: NOT AT ALL
SUM OF ALL RESPONSES TO PHQ QUESTIONS 1-9: 0

## 2025-02-25 ASSESSMENT — PAIN SCALES - GENERAL: PAINLEVEL_OUTOF10: 0

## 2025-02-25 NOTE — PROGRESS NOTES
MA Rooming Questions  Patient: Dilcia Buitrago  MRN: [id number]    Date: 2/25/2025        1. Do you have any new issues?   no         2. Do you need any refills on medications?    no    3. Have you had any imaging done since your last visit?   no    4. Have you been hospitalized or seen in the emergency room since your last visit here?   no    5. Did the patient have a depression screening completed today? Yes    PHQ-9 Total Score: 0 (2/25/2025  8:33 AM)       PHQ-9 Given to (if applicable):               PHQ-9 Score (if applicable):                     [] Positive     []  Negative              Does question #9 need addressed (if applicable)                     [] Yes    []  No               Adeline Meyer MA      
for: \"B2M\"  Coagulation Panel:  Lab Results   Component Value Date    PROTIME 11.6 (L) 07/15/2021    INR 0.90 07/15/2021    APTT 30.0 07/15/2021     Anemia Panel:  Lab Results   Component Value Date    BKPQFTGI39 274.9 06/02/2016    FOLATE 14.4 06/02/2016     Tumor Markers:  Lab Results   Component Value Date    LABCA2 20.3 01/09/2012     Observations:  PHQ-9 Total Score: 0 (2/25/2025  8:33 AM)       Assessment:  Right breast invasive lobular carcinoma   Left breast invasive ductal carcinoma  Previous history of left breast cancer     Plan:  Dilcia Buitrago is a 78 y.o. female with history of early left breast cancer diagnosed in 2009, s/p lumpectomy, radiation and endocrine therapy, now referred to us for bilateral breast cancer     Right breast invasive lobular carcinoma, grade 1, Ki-67 ~ 20%, ER - positive (95%), WI - negative (< 1%) and HER2 by IHC - equivocal (score 2+), HER2 by FISH - negative & left breast invasive ductal carcinoma, grade 1, Ki-67 ~ 20%, ER - positive (95%), WI - positive (95%), HER2 by IHC - equivocal (score 2+), HER2 by FISH - negative. Left axillary lymph node was positive for metastasis.       PET/CT on 8/28/24 that showed an enlarged hypermetabolic left axillary lymph node consistent with metastatic breast cancer.  No evidence of distant disease.     She underwent bilateral mastectomies and sentinel lymph node on the right and axillary lymph node dissection on the left on 9/23/2024 by Dr. Paulino.     Pathology revealed residual invasive lobular carcinoma on the right side, 0 of 2 lymph nodes, grade 2, LCIS was present, 6 mm in size, negative margins, making final surg pathology on the right side, stage IA [pT1b, pN0(sn)].      Pathology from the left side revealed invasive ductal carcinoma, 8 mm in size, grade 1, negative margins, LVSI was indeterminate, 2/12 lymph nodes the largest being 3.0 cm and ALAINA was present, making final surgical pathology on the left side, stage IIA (pT1b, pN1a,

## 2025-02-25 NOTE — PROGRESS NOTES
Patient finished RT today.  Per Dr. Napoles - patient to start on Verzenio 50 mg bid along with daily Letrozole.  Verzenio 50 mg bid #60 with 11 refills e-scripted to [facility] to begin the prior auth process.  Patient will have education prior to starting.

## 2025-02-25 NOTE — PROGRESS NOTES
Weekly Radiation Treatment Progress Note    DATE OF SERVICE: 2/25/2025     DIAGNOSIS:  bilateral synchronous breast cancer R breast ILC G1 pQ9pF8M0 ki67 20% ER 95% AZ <1% her2 equivocal by IHC FISH pending & L breast IDC G1 cT1cN1(f)M0 ki67 20% ER 95% AZ 95% her2 equivocal by IHC FISH unamplified; R breast ILC pT1bN0(sn) & L breast IDC [id number] (+judith)     TREATMENT COURSE:         Site: L CW & LNs   Current Total Radiation Dose: 4005 cGy  Fraction: 15/15    Pt doing well. Energy fairly good.  No skin itching/soreness. Breathing stable. No dysphagia/odynophagia  No skin issues.       EXAM  Wt Readings from Last 3 Encounters:   02/11/25 71.7 kg (158 lb)   02/04/25 71.8 kg (158 lb 3.2 oz)   12/19/24 70.3 kg (155 lb)     NAD      Setup images, chart, plan reviewed    A/P:   Tolerating RT well  Finished today. Rtc 4-6 weeks.       Electronically signed by Jimmy Ferrell MD on 2/25/2025 at 8:15 AM     present

## 2025-02-25 NOTE — PROGRESS NOTES
Radiation Oncology  Treatment Completion Summary  Encounter Date: 2025 8:28 AM    Ms. Dilcia Buitrago is a 79 y.o. female  : 1945  MRN: [id number]  [id number]    FOLLOW UP PHYSICIANS:   Primary Care: Suha Pinzon MD   Medical Oncologist: Dr. Napoles    DIAGNOSIS: bilateral synchronous breast cancer R breast ILC G1 [id number] ki67 20% ER 95% WV <1% her2 equivocal by IHC FISH pending & L breast IDC G1 cT1cN1(f)M0 ki67 20% ER 95% WV 95% her2 equivocal by IHC FISH unamplified; R breast ILC pT1bN0(sn) & L breast IDC [id number] (+judith)     TREATMENT COURSE:       HISTORY:  Dilcia Buitrago is a 79 y.o. female with the above referenced diagnosis.  Complete details on history of present illness please see my initial consultation note.  She has recently completed a course of adjuvant  radiation therapy and what follows is a description of the treatments she received:    ANATOMIC SITE: L RNI, CW omitted  prev radiation  BEAM ORIENTATION: VMAT  ENERGY: 6MV photons  DOSE PER FRACTION: 267cGy  TOTAL DOSE: 4005cGy; 15/15 fractions    ELAPSED DAYS: 25 - 25    TREATMENT TOLERANCE:   Overall, the patient tolerated her radiation therapy. No erythema or desquamation. No soreness with swallowing.     FOLLOW-UP PLANS:   She is to return to see me in 1 month or sooner as needed.    Electronically signed by Jimmy Ferrell MD on 2025 at 8:28 AM

## 2025-02-26 NOTE — PROGRESS NOTES
Radiation Oncology  Treatment Completion Summary  Encounter Date: 2025 11:22 AM    Ms. Dilcia Buitrago is a 79 y.o. female  : 1945  MRN: [id number]  [facility] Number: [id number]    FOLLOW UP PHYSICIANS:   Primary Care: Suha Pinzon MD   Medical Oncologist: Dr. Napoles    DIAGNOSIS: bilateral synchronous breast cancer R breast ILC G1 mK3rB8D2 ki67 20% ER 95% WV <1% her2 equivocal by IHC FISH pending & L breast IDC G1 cT1cN1(f)M0 ki67 20% ER 95% WV 95% her2 equivocal by IHC FISH unamplified; R breast ILC pT1bN0(sn) & L breast IDC [id number] (+judith)     TREATMENT COURSE:     HISTORY:  Dilcia Buitrago is a 79 y.o. female with the above referenced diagnosis.  Complete details on history of present illness please see my initial consultation note.  She has recently completed a course of adjuvant  radiation therapy and what follows is a description of the treatments she received:    ANATOMIC SITE: L RNI   BEAM ORIENTATION: VMAT  ENERGY: 6MV photons  DOSE PER FRACTION: 267cGy  TOTAL DOSE: 4005cGy; 15/15 fractions    ELAPSED DAYS: 25 - 25    TREATMENT TOLERANCE:   Overall, the patient tolerated her radiation therapy.     FOLLOW-UP PLANS:   She is to return to see me in 1 month or sooner as needed.    Electronically signed by Jimmy Ferrell MD on 2025 at 11:22 AM

## 2025-03-02 ENCOUNTER — APPOINTMENT (OUTPATIENT)
Dept: CT IMAGING | Age: 80
DRG: 392 | End: 2025-03-02
Payer: MEDICARE

## 2025-03-02 ENCOUNTER — HOSPITAL ENCOUNTER (INPATIENT)
Age: 80
LOS: 3 days | Discharge: HOME | DRG: 392 | End: 2025-03-05
Attending: EMERGENCY MEDICINE | Admitting: STUDENT IN AN ORGANIZED HEALTH CARE EDUCATION/TRAINING PROGRAM
Payer: MEDICARE

## 2025-03-02 DIAGNOSIS — R13.10 DYSPHAGIA, UNSPECIFIED TYPE: Primary | ICD-10-CM

## 2025-03-02 LAB
ALBUMIN SERPL-MCNC: 4.1 G/DL (ref 3.4–5)
ALBUMIN/GLOB SERPL: 1.3 {RATIO} (ref 1.1–2.2)
ALP SERPL-CCNC: 95 U/L (ref 40–129)
ALT SERPL-CCNC: 11 U/L (ref 10–40)
ANION GAP SERPL CALCULATED.3IONS-SCNC: 15 MMOL/L (ref 4–16)
AST SERPL-CCNC: 17 U/L (ref 15–37)
BASOPHILS # BLD: 0.04 K/UL
BASOPHILS NFR BLD: 1 % (ref 0–1)
BILIRUB SERPL-MCNC: 0.5 MG/DL (ref 0–1)
BUN SERPL-MCNC: 19 MG/DL (ref 6–23)
CALCIUM SERPL-MCNC: 9.5 MG/DL (ref 8.3–10.6)
CHLORIDE SERPL-SCNC: 97 MMOL/L (ref 99–110)
CO2 SERPL-SCNC: 20 MMOL/L (ref 21–32)
CREAT SERPL-MCNC: 1 MG/DL (ref 0.6–1.1)
EOSINOPHIL # BLD: 0.07 K/UL
EOSINOPHILS RELATIVE PERCENT: 1 % (ref 0–3)
ERYTHROCYTE [DISTWIDTH] IN BLOOD BY AUTOMATED COUNT: 13.7 % (ref 11.7–14.9)
GFR, ESTIMATED: 58 ML/MIN/1.73M2
GLUCOSE BLD-MCNC: 80 MG/DL (ref 74–99)
GLUCOSE SERPL-MCNC: 127 MG/DL (ref 74–99)
HCT VFR BLD AUTO: 39.5 % (ref 37–47)
HGB BLD-MCNC: 13.2 G/DL (ref 12.5–16)
IMM GRANULOCYTES # BLD AUTO: 0.02 K/UL
IMM GRANULOCYTES NFR BLD: 0 %
LIPASE SERPL-CCNC: 25 U/L (ref 13–60)
LYMPHOCYTES NFR BLD: 0.43 K/UL
LYMPHOCYTES RELATIVE PERCENT: 6 % (ref 24–44)
MCH RBC QN AUTO: 28.9 PG (ref 27–31)
MCHC RBC AUTO-ENTMCNC: 33.4 G/DL (ref 32–36)
MCV RBC AUTO: 86.4 FL (ref 78–100)
MONOCYTES NFR BLD: 0.37 K/UL
MONOCYTES NFR BLD: 5 % (ref 0–4)
NEUTROPHILS NFR BLD: 88 % (ref 36–66)
NEUTS SEG NFR BLD: 6.74 K/UL
PLATELET # BLD AUTO: 233 K/UL (ref 140–440)
PMV BLD AUTO: 9.8 FL (ref 7.5–11.1)
POTASSIUM SERPL-SCNC: 4.9 MMOL/L (ref 3.5–5.1)
PROT SERPL-MCNC: 7.2 G/DL (ref 6.4–8.2)
RBC # BLD AUTO: 4.57 M/UL (ref 4.2–5.4)
SODIUM SERPL-SCNC: 132 MMOL/L (ref 135–145)
TSH SERPL DL<=0.05 MIU/L-ACNC: 0.33 UIU/ML (ref 0.27–4.2)
WBC OTHER # BLD: 7.7 K/UL (ref 4–10.5)

## 2025-03-02 PROCEDURE — 6360000002 HC RX W HCPCS: Performed by: EMERGENCY MEDICINE

## 2025-03-02 PROCEDURE — 82962 GLUCOSE BLOOD TEST: CPT

## 2025-03-02 PROCEDURE — 84443 ASSAY THYROID STIM HORMONE: CPT

## 2025-03-02 PROCEDURE — 6360000004 HC RX CONTRAST MEDICATION: Performed by: EMERGENCY MEDICINE

## 2025-03-02 PROCEDURE — 85025 COMPLETE CBC W/AUTO DIFF WBC: CPT

## 2025-03-02 PROCEDURE — 70491 CT SOFT TISSUE NECK W/DYE: CPT

## 2025-03-02 PROCEDURE — 1200000000 HC SEMI PRIVATE

## 2025-03-02 PROCEDURE — 83690 ASSAY OF LIPASE: CPT

## 2025-03-02 PROCEDURE — 2580000003 HC RX 258: Performed by: EMERGENCY MEDICINE

## 2025-03-02 PROCEDURE — 70450 CT HEAD/BRAIN W/O DYE: CPT

## 2025-03-02 PROCEDURE — 71275 CT ANGIOGRAPHY CHEST: CPT

## 2025-03-02 PROCEDURE — 96360 HYDRATION IV INFUSION INIT: CPT

## 2025-03-02 PROCEDURE — 96361 HYDRATE IV INFUSION ADD-ON: CPT

## 2025-03-02 PROCEDURE — 99285 EMERGENCY DEPT VISIT HI MDM: CPT

## 2025-03-02 PROCEDURE — 2580000003 HC RX 258: Performed by: STUDENT IN AN ORGANIZED HEALTH CARE EDUCATION/TRAINING PROGRAM

## 2025-03-02 PROCEDURE — 96372 THER/PROPH/DIAG INJ SC/IM: CPT

## 2025-03-02 PROCEDURE — 80053 COMPREHEN METABOLIC PANEL: CPT

## 2025-03-02 RX ORDER — LISINOPRIL 20 MG/1
20 TABLET ORAL DAILY
Status: DISCONTINUED | OUTPATIENT
Start: 2025-03-03 | End: 2025-03-05 | Stop reason: HOSPADM

## 2025-03-02 RX ORDER — IOPAMIDOL 755 MG/ML
100 INJECTION, SOLUTION INTRAVASCULAR
Status: COMPLETED | OUTPATIENT
Start: 2025-03-02 | End: 2025-03-02

## 2025-03-02 RX ORDER — GLUCAGON 1 MG/ML
1 KIT INJECTION ONCE
Status: COMPLETED | OUTPATIENT
Start: 2025-03-02 | End: 2025-03-02

## 2025-03-02 RX ORDER — ENOXAPARIN SODIUM 100 MG/ML
40 INJECTION SUBCUTANEOUS DAILY
Status: DISCONTINUED | OUTPATIENT
Start: 2025-03-03 | End: 2025-03-05 | Stop reason: HOSPADM

## 2025-03-02 RX ORDER — DEXTROSE MONOHYDRATE 100 MG/ML
1000 INJECTION, SOLUTION INTRAVENOUS CONTINUOUS PRN
Status: DISCONTINUED | OUTPATIENT
Start: 2025-03-02 | End: 2025-03-05 | Stop reason: HOSPADM

## 2025-03-02 RX ORDER — MAGNESIUM SULFATE IN WATER 40 MG/ML
2000 INJECTION, SOLUTION INTRAVENOUS PRN
Status: DISCONTINUED | OUTPATIENT
Start: 2025-03-02 | End: 2025-03-05 | Stop reason: HOSPADM

## 2025-03-02 RX ORDER — SODIUM CHLORIDE 0.9 % (FLUSH) 0.9 %
5-40 SYRINGE (ML) INJECTION PRN
Status: DISCONTINUED | OUTPATIENT
Start: 2025-03-02 | End: 2025-03-05 | Stop reason: HOSPADM

## 2025-03-02 RX ORDER — PANTOPRAZOLE SODIUM 40 MG/1
40 TABLET, DELAYED RELEASE ORAL
Status: DISCONTINUED | OUTPATIENT
Start: 2025-03-02 | End: 2025-03-02

## 2025-03-02 RX ORDER — POTASSIUM CHLORIDE 1500 MG/1
40 TABLET, EXTENDED RELEASE ORAL PRN
Status: ACTIVE | OUTPATIENT
Start: 2025-03-02 | End: 2025-03-04

## 2025-03-02 RX ORDER — INSULIN LISPRO 100 [IU]/ML
0-4 INJECTION, SOLUTION INTRAVENOUS; SUBCUTANEOUS
Status: DISCONTINUED | OUTPATIENT
Start: 2025-03-02 | End: 2025-03-05 | Stop reason: HOSPADM

## 2025-03-02 RX ORDER — ACETAMINOPHEN 650 MG/1
650 SUPPOSITORY RECTAL EVERY 6 HOURS PRN
Status: DISCONTINUED | OUTPATIENT
Start: 2025-03-02 | End: 2025-03-05 | Stop reason: HOSPADM

## 2025-03-02 RX ORDER — POLYETHYLENE GLYCOL 3350 17 G/17G
17 POWDER, FOR SOLUTION ORAL DAILY PRN
Status: DISCONTINUED | OUTPATIENT
Start: 2025-03-02 | End: 2025-03-05 | Stop reason: HOSPADM

## 2025-03-02 RX ORDER — PANTOPRAZOLE SODIUM 40 MG/10ML
40 INJECTION, POWDER, LYOPHILIZED, FOR SOLUTION INTRAVENOUS DAILY
Status: DISCONTINUED | OUTPATIENT
Start: 2025-03-03 | End: 2025-03-05 | Stop reason: HOSPADM

## 2025-03-02 RX ORDER — GLUCAGON 1 MG/ML
1 KIT INJECTION PRN
Status: DISCONTINUED | OUTPATIENT
Start: 2025-03-02 | End: 2025-03-05 | Stop reason: HOSPADM

## 2025-03-02 RX ORDER — SODIUM CHLORIDE 0.9 % (FLUSH) 0.9 %
5-40 SYRINGE (ML) INJECTION EVERY 12 HOURS SCHEDULED
Status: DISCONTINUED | OUTPATIENT
Start: 2025-03-02 | End: 2025-03-05 | Stop reason: HOSPADM

## 2025-03-02 RX ORDER — LEVOTHYROXINE SODIUM 75 UG/1
75 TABLET ORAL
Status: DISCONTINUED | OUTPATIENT
Start: 2025-03-03 | End: 2025-03-05 | Stop reason: HOSPADM

## 2025-03-02 RX ORDER — 0.9 % SODIUM CHLORIDE 0.9 %
1000 INTRAVENOUS SOLUTION INTRAVENOUS ONCE
Status: COMPLETED | OUTPATIENT
Start: 2025-03-02 | End: 2025-03-02

## 2025-03-02 RX ORDER — ACETAMINOPHEN 325 MG/1
650 TABLET ORAL EVERY 6 HOURS PRN
Status: DISCONTINUED | OUTPATIENT
Start: 2025-03-02 | End: 2025-03-05 | Stop reason: HOSPADM

## 2025-03-02 RX ORDER — ONDANSETRON 4 MG/1
4 TABLET, ORALLY DISINTEGRATING ORAL EVERY 8 HOURS PRN
Status: DISCONTINUED | OUTPATIENT
Start: 2025-03-02 | End: 2025-03-05 | Stop reason: HOSPADM

## 2025-03-02 RX ORDER — SODIUM CHLORIDE, SODIUM LACTATE, POTASSIUM CHLORIDE, CALCIUM CHLORIDE 600; 310; 30; 20 MG/100ML; MG/100ML; MG/100ML; MG/100ML
1000 INJECTION, SOLUTION INTRAVENOUS CONTINUOUS
Status: DISPENSED | OUTPATIENT
Start: 2025-03-02 | End: 2025-03-03

## 2025-03-02 RX ORDER — ONDANSETRON 2 MG/ML
4 INJECTION INTRAMUSCULAR; INTRAVENOUS EVERY 6 HOURS PRN
Status: DISCONTINUED | OUTPATIENT
Start: 2025-03-02 | End: 2025-03-05 | Stop reason: HOSPADM

## 2025-03-02 RX ORDER — SODIUM CHLORIDE 9 MG/ML
500 INJECTION, SOLUTION INTRAVENOUS PRN
Status: DISCONTINUED | OUTPATIENT
Start: 2025-03-02 | End: 2025-03-05 | Stop reason: HOSPADM

## 2025-03-02 RX ORDER — FERROUS SULFATE 325(65) MG
325 TABLET ORAL
Status: DISCONTINUED | OUTPATIENT
Start: 2025-03-03 | End: 2025-03-05 | Stop reason: HOSPADM

## 2025-03-02 RX ORDER — POTASSIUM CHLORIDE 7.45 MG/ML
10 INJECTION INTRAVENOUS PRN
Status: ACTIVE | OUTPATIENT
Start: 2025-03-02 | End: 2025-03-04

## 2025-03-02 RX ADMIN — IOPAMIDOL 100 ML: 755 INJECTION, SOLUTION INTRAVENOUS at 11:10

## 2025-03-02 RX ADMIN — SODIUM CHLORIDE 1000 ML: 9 INJECTION, SOLUTION INTRAVENOUS at 10:00

## 2025-03-02 RX ADMIN — SODIUM CHLORIDE, SODIUM LACTATE, POTASSIUM CHLORIDE, AND CALCIUM CHLORIDE 1000 ML: .6; .31; .03; .02 INJECTION, SOLUTION INTRAVENOUS at 15:10

## 2025-03-02 RX ADMIN — GLUCAGON 1 MG: 1 INJECTION, POWDER, LYOPHILIZED, FOR SOLUTION INTRAMUSCULAR; INTRAVENOUS at 10:01

## 2025-03-02 ASSESSMENT — PAIN - FUNCTIONAL ASSESSMENT: PAIN_FUNCTIONAL_ASSESSMENT: NONE - DENIES PAIN

## 2025-03-02 ASSESSMENT — PAIN SCALES - GENERAL
PAINLEVEL_OUTOF10: 0
PAINLEVEL_OUTOF10: 0

## 2025-03-02 NOTE — ED PROVIDER NOTES
[facility]    Emergency Department Encounter      Patient: Dilcia Buitrago  MRN: [id number]  : 1945  Date of Evaluation: 3/2/2025  ED Provider:  Sudha Leal DO    Triage Chief Complaint:   Dysphagia (States for a few months has been feeling like \"when I swallow things get caught in my throat.\")    [place]:  Dilcia Buitrago is a 79 y.o. female who  has a past medical history of Arthritis, Cancer (HCC), History of external beam radiation therapy, History of external beam radiation therapy, and Hyperlipidemia. and presents with     Trouble swallowing  Can hardly swallow, even fluids wthout choking on it  She took her pill today and was able to get it down but choked all the water out.    Completed breast cancer treatment.    Just finished radiation at the beginning of this week.    79 y.o. F who has been in radiation for breast cancer and had her last treatment on Tuesday who has had trouble on and off for a couple of months when she is choking on bites of food and sister-in law said even with a sip of something she chokes on it.  she took a pill last night and this morning and choked on her pills both times.  she couldn't drink water afterwards today.  she feels like the pill went down but tried sips of water last night and pill went down this morning but has had trouble with liquids afterwards.  she tolerated soup last night.  has trouble with solids.  Said she has never seen GI before. However, notes from Dr. Hill on chart. See below:      ROS - see HPI, below listed is current ROS at time of my eval:   systems reviewed and negative except as above.     Past Medical History:   Diagnosis Date    Arthritis     Cancer (HCC)     History of external beam radiation therapy 10/2009    LEFT BREAST 6,100 cGy in 33 fractions    History of external beam radiation therapy 2025    LEFT AX/SCV 4,005 cGy in 15 fractions    Hyperlipidemia      Past Surgical History:   Procedure Laterality Date    BREAST

## 2025-03-03 ENCOUNTER — ANESTHESIA (OUTPATIENT)
Dept: ENDOSCOPY | Age: 80
DRG: 392 | End: 2025-03-03
Payer: MEDICARE

## 2025-03-03 ENCOUNTER — ANESTHESIA EVENT (OUTPATIENT)
Dept: ENDOSCOPY | Age: 80
DRG: 392 | End: 2025-03-03
Payer: MEDICARE

## 2025-03-03 LAB
ALBUMIN SERPL-MCNC: 3.2 G/DL (ref 3.4–5)
ALBUMIN/GLOB SERPL: 1.2 {RATIO} (ref 1.1–2.2)
ALP SERPL-CCNC: 67 U/L (ref 40–129)
ALT SERPL-CCNC: 7 U/L (ref 10–40)
ANION GAP SERPL CALCULATED.3IONS-SCNC: 10 MMOL/L (ref 9–17)
AST SERPL-CCNC: 16 U/L (ref 15–37)
BASOPHILS # BLD: 0.03 K/UL
BASOPHILS NFR BLD: 1 % (ref 0–1)
BILIRUB SERPL-MCNC: 0.6 MG/DL (ref 0–1)
BUN SERPL-MCNC: 14 MG/DL (ref 7–20)
CALCIUM SERPL-MCNC: 8.8 MG/DL (ref 8.3–10.6)
CHLORIDE SERPL-SCNC: 103 MMOL/L (ref 99–110)
CO2 SERPL-SCNC: 21 MMOL/L (ref 21–32)
CREAT SERPL-MCNC: 0.9 MG/DL (ref 0.6–1.2)
EOSINOPHIL # BLD: 0.24 K/UL
EOSINOPHILS RELATIVE PERCENT: 6 % (ref 0–3)
ERYTHROCYTE [DISTWIDTH] IN BLOOD BY AUTOMATED COUNT: 14 % (ref 11.7–14.9)
GFR, ESTIMATED: 60 ML/MIN/1.73M2
GLUCOSE BLD-MCNC: 101 MG/DL (ref 74–99)
GLUCOSE BLD-MCNC: 60 MG/DL (ref 74–99)
GLUCOSE BLD-MCNC: 66 MG/DL (ref 74–99)
GLUCOSE BLD-MCNC: 75 MG/DL (ref 74–99)
GLUCOSE BLD-MCNC: 87 MG/DL (ref 74–99)
GLUCOSE BLD-MCNC: 98 MG/DL (ref 74–99)
GLUCOSE SERPL-MCNC: 79 MG/DL (ref 74–99)
HCT VFR BLD AUTO: 33.1 % (ref 37–47)
HGB BLD-MCNC: 10.5 G/DL (ref 12.5–16)
IMM GRANULOCYTES # BLD AUTO: 0 K/UL
IMM GRANULOCYTES NFR BLD: 0 %
INR PPP: 1.1
LIPASE SERPL-CCNC: 23 U/L (ref 13–60)
LYMPHOCYTES NFR BLD: 0.62 K/UL
LYMPHOCYTES RELATIVE PERCENT: 16 % (ref 24–44)
MCH RBC QN AUTO: 28.3 PG (ref 27–31)
MCHC RBC AUTO-ENTMCNC: 31.7 G/DL (ref 32–36)
MCV RBC AUTO: 89.2 FL (ref 78–100)
MONOCYTES NFR BLD: 0.49 K/UL
MONOCYTES NFR BLD: 13 % (ref 0–4)
NEUTROPHILS NFR BLD: 64 % (ref 36–66)
NEUTS SEG NFR BLD: 2.47 K/UL
PHOSPHATE SERPL-MCNC: 3.3 MG/DL (ref 2.5–4.9)
PLATELET # BLD AUTO: 219 K/UL (ref 140–440)
PMV BLD AUTO: 9.2 FL (ref 7.5–11.1)
POTASSIUM SERPL-SCNC: 4.7 MMOL/L (ref 3.5–5.1)
PROT SERPL-MCNC: 5.8 G/DL (ref 6.4–8.2)
PROTHROMBIN TIME: 14 SEC (ref 11.7–14.5)
RBC # BLD AUTO: 3.71 M/UL (ref 4.2–5.4)
SODIUM SERPL-SCNC: 134 MMOL/L (ref 136–145)
WBC OTHER # BLD: 3.9 K/UL (ref 4–10.5)

## 2025-03-03 PROCEDURE — 3700000001 HC ADD 15 MINUTES (ANESTHESIA): Performed by: SPECIALIST

## 2025-03-03 PROCEDURE — 94761 N-INVAS EAR/PLS OXIMETRY MLT: CPT

## 2025-03-03 PROCEDURE — 36415 COLL VENOUS BLD VENIPUNCTURE: CPT

## 2025-03-03 PROCEDURE — 99223 1ST HOSP IP/OBS HIGH 75: CPT | Performed by: INTERNAL MEDICINE

## 2025-03-03 PROCEDURE — 80053 COMPREHEN METABOLIC PANEL: CPT

## 2025-03-03 PROCEDURE — 85025 COMPLETE CBC W/AUTO DIFF WBC: CPT

## 2025-03-03 PROCEDURE — 84100 ASSAY OF PHOSPHORUS: CPT

## 2025-03-03 PROCEDURE — 2580000003 HC RX 258

## 2025-03-03 PROCEDURE — 2500000003 HC RX 250 WO HCPCS: Performed by: STUDENT IN AN ORGANIZED HEALTH CARE EDUCATION/TRAINING PROGRAM

## 2025-03-03 PROCEDURE — 82962 GLUCOSE BLOOD TEST: CPT

## 2025-03-03 PROCEDURE — APPNB45 APP NON BILLABLE 31-45 MINUTES: Performed by: PHYSICIAN ASSISTANT

## 2025-03-03 PROCEDURE — 2580000003 HC RX 258: Performed by: NURSE ANESTHETIST, CERTIFIED REGISTERED

## 2025-03-03 PROCEDURE — 3700000000 HC ANESTHESIA ATTENDED CARE: Performed by: SPECIALIST

## 2025-03-03 PROCEDURE — 6370000000 HC RX 637 (ALT 250 FOR IP): Performed by: SPECIALIST

## 2025-03-03 PROCEDURE — 6360000002 HC RX W HCPCS: Performed by: STUDENT IN AN ORGANIZED HEALTH CARE EDUCATION/TRAINING PROGRAM

## 2025-03-03 PROCEDURE — 83690 ASSAY OF LIPASE: CPT

## 2025-03-03 PROCEDURE — 0DJ08ZZ INSPECTION OF UPPER INTESTINAL TRACT, VIA NATURAL OR ARTIFICIAL OPENING ENDOSCOPIC: ICD-10-PCS | Performed by: SPECIALIST

## 2025-03-03 PROCEDURE — 85610 PROTHROMBIN TIME: CPT

## 2025-03-03 PROCEDURE — 2580000003 HC RX 258: Performed by: STUDENT IN AN ORGANIZED HEALTH CARE EDUCATION/TRAINING PROGRAM

## 2025-03-03 PROCEDURE — 6370000000 HC RX 637 (ALT 250 FOR IP): Performed by: STUDENT IN AN ORGANIZED HEALTH CARE EDUCATION/TRAINING PROGRAM

## 2025-03-03 PROCEDURE — 1200000000 HC SEMI PRIVATE

## 2025-03-03 PROCEDURE — 6360000002 HC RX W HCPCS: Performed by: NURSE ANESTHETIST, CERTIFIED REGISTERED

## 2025-03-03 PROCEDURE — 3609017100 HC EGD: Performed by: SPECIALIST

## 2025-03-03 PROCEDURE — 2709999900 HC NON-CHARGEABLE SUPPLY: Performed by: SPECIALIST

## 2025-03-03 RX ORDER — SODIUM CHLORIDE, SODIUM LACTATE, POTASSIUM CHLORIDE, CALCIUM CHLORIDE 600; 310; 30; 20 MG/100ML; MG/100ML; MG/100ML; MG/100ML
INJECTION, SOLUTION INTRAVENOUS
Status: DISCONTINUED | OUTPATIENT
Start: 2025-03-03 | End: 2025-03-03 | Stop reason: SDUPTHER

## 2025-03-03 RX ORDER — LETROZOLE 2.5 MG/1
2.5 TABLET, FILM COATED ORAL DAILY
Status: DISCONTINUED | OUTPATIENT
Start: 2025-03-03 | End: 2025-03-05 | Stop reason: HOSPADM

## 2025-03-03 RX ORDER — LIDOCAINE HYDROCHLORIDE 20 MG/ML
INJECTION, SOLUTION INTRAVENOUS
Status: DISCONTINUED | OUTPATIENT
Start: 2025-03-03 | End: 2025-03-03 | Stop reason: SDUPTHER

## 2025-03-03 RX ORDER — PROPOFOL 10 MG/ML
INJECTION, EMULSION INTRAVENOUS
Status: DISCONTINUED | OUTPATIENT
Start: 2025-03-03 | End: 2025-03-03 | Stop reason: SDUPTHER

## 2025-03-03 RX ORDER — SUCRALFATE 1 G/1
1 TABLET ORAL EVERY 6 HOURS SCHEDULED
Status: DISCONTINUED | OUTPATIENT
Start: 2025-03-03 | End: 2025-03-05 | Stop reason: HOSPADM

## 2025-03-03 RX ORDER — SODIUM CHLORIDE 9 MG/ML
INJECTION, SOLUTION INTRAMUSCULAR; INTRAVENOUS; SUBCUTANEOUS
Status: COMPLETED
Start: 2025-03-03 | End: 2025-03-03

## 2025-03-03 RX ADMIN — SODIUM CHLORIDE, PRESERVATIVE FREE 10 ML: 5 INJECTION INTRAVENOUS at 09:29

## 2025-03-03 RX ADMIN — Medication 10 ML: at 09:29

## 2025-03-03 RX ADMIN — SODIUM CHLORIDE 10 ML: 9 INJECTION INTRAMUSCULAR; INTRAVENOUS; SUBCUTANEOUS at 09:29

## 2025-03-03 RX ADMIN — ACETAMINOPHEN 650 MG: 325 TABLET ORAL at 20:37

## 2025-03-03 RX ADMIN — DEXTROSE MONOHYDRATE 125 ML: 100 INJECTION, SOLUTION INTRAVENOUS at 11:45

## 2025-03-03 RX ADMIN — SODIUM CHLORIDE, PRESERVATIVE FREE 10 ML: 5 INJECTION INTRAVENOUS at 20:37

## 2025-03-03 RX ADMIN — DEXTROSE MONOHYDRATE 125 ML: 100 INJECTION, SOLUTION INTRAVENOUS at 16:20

## 2025-03-03 RX ADMIN — SUCRALFATE 1 G: 1 TABLET ORAL at 16:57

## 2025-03-03 RX ADMIN — PANTOPRAZOLE SODIUM 40 MG: 40 INJECTION, POWDER, FOR SOLUTION INTRAVENOUS at 09:26

## 2025-03-03 RX ADMIN — SODIUM CHLORIDE, POTASSIUM CHLORIDE, SODIUM LACTATE AND CALCIUM CHLORIDE: 600; 310; 30; 20 INJECTION, SOLUTION INTRAVENOUS at 14:28

## 2025-03-03 RX ADMIN — PROPOFOL 150 MG: 10 INJECTION, EMULSION INTRAVENOUS at 14:35

## 2025-03-03 RX ADMIN — LIDOCAINE HYDROCHLORIDE 100 MG: 20 INJECTION, SOLUTION INTRAVENOUS at 14:35

## 2025-03-03 RX ADMIN — SODIUM CHLORIDE, SODIUM LACTATE, POTASSIUM CHLORIDE, AND CALCIUM CHLORIDE 1000 ML: .6; .31; .03; .02 INJECTION, SOLUTION INTRAVENOUS at 01:07

## 2025-03-03 ASSESSMENT — PAIN SCALES - GENERAL
PAINLEVEL_OUTOF10: 4
PAINLEVEL_OUTOF10: 0

## 2025-03-03 ASSESSMENT — PAIN DESCRIPTION - ORIENTATION: ORIENTATION: LEFT;LOWER

## 2025-03-03 ASSESSMENT — PAIN DESCRIPTION - LOCATION: LOCATION: MOUTH;TEETH

## 2025-03-03 ASSESSMENT — PAIN DESCRIPTION - DESCRIPTORS: DESCRIPTORS: ACHING;SORE

## 2025-03-03 NOTE — ANESTHESIA POSTPROCEDURE EVALUATION
Department of Anesthesiology  Postprocedure Note    Patient: Dilcia Buitrago  MRN: [id number]  YOB: 1945  Date of evaluation: 3/3/2025    Procedure Summary       Date: 03/03/25 Room / Location: [place] [address] / [facility]    Anesthesia Start: 1431 Anesthesia Stop: 1457    Procedure: ESOPHAGOGASTRODUODENOSCOPY DIAGNOSTIC ONLY Diagnosis:       Dysphagia, unspecified type      (Dysphagia, unspecified type [R13.10])    Surgeons: Marcos Hill MD Responsible Provider: Maxi Marie MD    Anesthesia Type: MAC ASA Status: 3            Anesthesia Type: No value filed.    Sky Phase I:      Sky Phase II:      Anesthesia Post Evaluation    Patient location during evaluation: bedside  Patient participation: complete - patient participated  Level of consciousness: awake and alert  Pain score: 0  Airway patency: patent  Nausea & Vomiting: no vomiting and no nausea  Cardiovascular status: blood pressure returned to baseline and hemodynamically stable  Respiratory status: acceptable, room air, spontaneous ventilation and nonlabored ventilation  Hydration status: stable  Pain management: adequate    No notable events documented.

## 2025-03-03 NOTE — BRIEF OP NOTE
Brief Postoperative Note      Dilcia Buitrago is a 79 y.o. female     Pre-operative Diagnosis: DYSPHAGIA    Post-operative Diagnosis: LARGE H/H / SEVERELY ULCERATED FRIABLE AND NARROWED UES FROM RECENT RAD TX    Procedure: EGD    Anesthesia: MAC    Surgeons/Assistants:Marcos Hill MD     Estimated Blood Loss: NIL    Complications: None    Specimens: were not obtained  REC-- CPM / ADD CARAFATE SUSPENSION / CLD / IF PT UNABLE TO TOLERATE CLD MIGHT NEED TO CONSIDER PEG TUB PLACEMENT    Marcos Hill MD   3/3/2025   2:50 PM

## 2025-03-03 NOTE — H&P
General Surgery Operative Note    Pre-operative diagnosis:  Right inguinal hernia [K40.90]   Post-operative diagnosis: same   Procedure: Robotic assisted right inguinal hernia repair with mesh   Surgeon: Miguel Irby MD   Assistant(s): Jennifer Savage PA-C - the physician assistant was medically necessary to assist in prepping, positioning, camera operation, retraction/exposure and instrument exchange.   Anesthesia: General    Estimated blood loss: 2 cc's   Drains placed: None   Complications:  None   Findings:  Prominent indirect right inguinal hernia.  No evidence of left inguinal hernia.  The right side was repaired using preperitoneal ProGrip mesh.     Indications for operation: This is a 34-year-old gentleman who presented with right inguinal bulging.  Exam revealed an obvious right inguinal hernia.  I recommended robotic assisted repair with mesh.  We discussed the procedure, along with its risks and complications, in detail.  The patient agreed to proceed.    Details of the operation: After informed consent, the patient was taken to the operating room, where he underwent satisfactory induction of general anesthesia.  The patient was sterilely prepped and draped and a supraumbilical skin incision was made using a skin knife.  Dissection was carried bluntly down to the fascia, which was opened very slightly using electrocautery.  The robotic camera port was inserted and pneumoperitoneum was achieved using CO2 insufflation.  An 8 mm robotic port was now placed on each side of the abdomen under direct visualization.  The patient was placed in slight Trendelenburg position and the robot was brought in and docked without difficulty.  Inspection of the left side revealed no evidence of hernia.  The right side showed an obvious indirect hernia.  I proceeded to the console.  The peritoneum was scored on the right side above the level of the ASIS.  The preperitoneal space was then developed, and the hernia sac 
NO CHANGE IN H AND P IN THE LAST 24 HRS  
entirely reduced.  A piece of ProGrip mesh was now brought onto the field and trimmed on the corners.  This was placed into the preperitoneal space and deployed so that it lay smoothly.  It was centered over the internal ring.  The peritoneum was now closed using a running 3-0 STRATAFIX suture.  The robot was undocked and the pneumoperitoneum was released.  The trocars were removed and the supraumbilical fascia was closed using interrupted 0 Vicryl sutures.  Skin incisions were closed using 4-0 subcuticular, followed by Steri-Strips.    The patient tolerated the procedure well and was transferred to the recovery room in satisfactory condition.  Sponge and needle counts were correct at the close of the case.    Specimens: * No specimens in log *        Miguel Irby MD    
mL, PRN  sodium chloride, 500 mL, PRN  potassium chloride, 40 mEq, PRN   Or  potassium alternative oral replacement, 40 mEq, PRN   Or  potassium chloride, 10 mEq, PRN  magnesium sulfate, 2,000 mg, PRN  ondansetron, 4 mg, Q8H PRN   Or  ondansetron, 4 mg, Q6H PRN  polyethylene glycol, 17 g, Daily PRN  acetaminophen, 650 mg, Q6H PRN   Or  acetaminophen, 650 mg, Q6H PRN  glucose, 4 tablet, PRN  dextrose bolus, 125 mL, PRN   Or  dextrose bolus, 250 mL, PRN  glucagon (rDNA), 1 mg, PRN  dextrose, 1,000 mL, Continuous PRN        Labs      CBC:   Recent Labs     03/02/25  1002   WBC 7.7   HGB 13.2        BMP:    Recent Labs     03/02/25  1002   *   K 4.9   CL 97*   CO2 20*   BUN 19   CREATININE 1.0   GLUCOSE 127*     Hepatic:   Recent Labs     03/02/25  1002   AST 17   ALT 11   BILITOT 0.5   ALKPHOS 95     Lipids:   Lab Results   Component Value Date/Time    CHOL 270 11/07/2016 09:24 AM    HDL 66 11/07/2016 09:24 AM    TRIG 193 11/07/2016 09:24 AM     Hemoglobin A1C:   Lab Results   Component Value Date/Time    LABA1C 6.1 09/29/2019 07:26 AM     TSH: No results found for: \"TSH\"  Troponin:   Lab Results   Component Value Date/Time    TROPONINT 0.027 07/13/2021 03:12 PM    TROPONINT 0.040 07/12/2021 11:32 AM    TROPONINT 0.091 05/11/2020 08:30 PM     Lactic Acid: No results for input(s): \"LACTA\" in the last 72 hours.  BNP: No results for input(s): \"PROBNP\" in the last 72 hours.  UA:  Lab Results   Component Value Date/Time    NITRU NEGATIVE 07/12/2021 12:30 PM    COLORU YELLOW 07/12/2021 12:30 PM    PHUR 7.0 07/12/2021 12:30 PM    WBCUA 1 07/12/2021 12:30 PM    RBCUA 2 07/12/2021 12:30 PM    MUCUS RARE 05/10/2020 01:30 PM    TRICHOMONAS NONE SEEN 07/12/2021 12:30 PM    BACTERIA RARE 07/12/2021 12:30 PM    CLARITYU CLEAR 07/12/2021 12:30 PM    LEUKOCYTESUR MODERATE 07/12/2021 12:30 PM    UROBILINOGEN NEGATIVE 07/12/2021 12:30 PM    BILIRUBINUR NEGATIVE 07/12/2021 12:30 PM    BLOODU NEGATIVE 07/12/2021 12:30 PM

## 2025-03-03 NOTE — ANESTHESIA PRE PROCEDURE
results found for: \"PREGTESTUR\", \"PREGSERUM\", \"HCG\", \"HCGQUANT\"     ABGs: No results found for: \"PHART\", \"PO2ART\", \"WOD0BLX\", \"VNC4ULS\", \"BEART\", \"N2TLBEDF\"     Type & Screen (If Applicable):  Lab Results   Component Value Date    ABORH A POSITIVE 07/12/2021    LABANTI NEGATIVE 07/12/2021       Drug/Infectious Status (If Applicable):  No results found for: \"HIV\", \"HEPCAB\"    COVID-19 Screening (If Applicable):   Lab Results   Component Value Date/Time    COVID19 NOT DETECTED 07/14/2021 02:40 PM    COVID19 NASOPHARYNGEAL SWAB  NOT DETECTED   05/10/2020 10:55 AM           Anesthesia Evaluation    Airway: Mallampati: III  TM distance: >3 FB   Neck ROM: limited     Dental:    (+) poor dentition      Pulmonary:Negative Pulmonary ROS and normal exam                               Cardiovascular:  Exercise tolerance: poor (<4 METS)  (+) hypertension:, dysrhythmias: atrial fibrillation        Rhythm: regular  Rate: normal                 ROS comment: Echo 9/2019     Summary   Left ventricular function is normal, EF is estimated at 55-60%.   Mild left ventricular hypertrophy.   Mild tricuspid and mitral regurgitation noted.   No evidence of pericardial effusion.       Neuro/Psych:   Negative Neuro/Psych ROS              GI/Hepatic/Renal: Neg GI/Hepatic/Renal ROS            Endo/Other:    (+) Diabetes, blood dyscrasia: anemia:., malignancy/cancer.                  ROS comment: Hx breast cancer, s/p radiation and chemo Abdominal: normal exam            Vascular: negative vascular ROS.         Other Findings:         Anesthesia Plan      MAC     ASA 3       Induction: intravenous.      Anesthetic plan and risks discussed with patient.      Plan discussed with CRNA and attending.                  LORENZO Hoffman - CRNA   3/3/2025

## 2025-03-03 NOTE — PROCEDURES
PROCEDURE NOTE  Date: 3/3/2025   Name: Dilcia Buitrago  YOB: 1945    Procedures EGD REPORT DICTATED #[id number]

## 2025-03-03 NOTE — CONSULTS
CONSULT DICTATED #[id number]  
03/03/2025 02:49 AM    AST 16 03/03/2025 02:49 AM    ALT 7 03/03/2025 02:49 AM     Albumin:  No results found for: \"LABALBU\"  PT/INR:    Lab Results   Component Value Date/Time    PROTIME 14.0 03/03/2025 02:49 AM    INR 1.1 03/03/2025 02:49 AM     HgBA1c:    Lab Results   Component Value Date/Time    LABA1C 6.1 09/29/2019 07:26 AM         Assessment:     Patient Active Problem List   Diagnosis    HX: breast cancer    Iron deficiency anemia    Essential hypertension, benign    Type 2 diabetes mellitus without complication (HCC)    Pure hypercholesterolemia    History of left breast cancer    Acute blood loss anemia    Atrial fibrillation with rapid ventricular response (HCC)    Acute kidney injury    Hyponatremia    Ileus (HCC)    Acute on chronic cholecystitis    Acute on chronic anemia    Malignant neoplasm of upper-outer quadrant of left breast in female, estrogen receptor positive (HCC)    Malignant neoplasm of lower-inner quadrant of right breast of female, estrogen receptor positive (HCC)    Ulcer of chest wall with fat layer exposed (HCC)    Post-op pain    Visit for wound check    Surgical wound, non healing    Dysphagia       Measurements:       Response to treatment:  Well tolerated by patient.     Pain Assessment:  Severity:  none  Quality of pain: none  Wound Pain Timing/Severity: none  Premedicated: none    Plan:     Plan of Care: [REMOVED] Wound 05/11/20 Buttocks Right-Dressing/Treatment: Collagen, Silicone border    Alert and agreeable to wound assessment. Right buttock with possible DTI and pressure areas stage 3. Area moist with non blanchable area to ambrocio wound.  Able to move self in bed, pt. unsure if any added pressure to area, stated that she sits in her chair mostly. No other areas of concern, refused to allow heels to be checked.     Specialty Bed Required : atmos air applied to bed, skin guard mattress ordered   [] Low Air Loss   [x] Pressure Redistribution  [] Fluid Immersion  [] 
in bed, in no acute distress.  She is awake, alert, and oriented and pleasant to talk with, unable to handle her secretions well.  VITAL SIGNS:  Stable.  Please refer to the chart.  HEENT:  Shows skull to be atraumatic.  NECK:  Supple.  CHEST:  Clear.  HEART:  S1 and S2 are normal.  ABDOMEN:  Soft, nontender, nondistended.  Liver and spleen are not palpable.  RECTAL:  Deferred.  CNS:  Shows the patient to be awake, alert, and oriented.  There are no focal sensorimotor signs.  MUSCULOSKELETAL SYSTEM:  Shows evidence of degenerative joint disease changes.    LABORATORY DATA:  As above mentioned.    IMPRESSION:    1. 79-year-old white female with history of carcinoma of the breast, status post bilateral mastectomy followed by radiation therapy, presents with progressive dysphagia for the past few weeks with recent worsening and unable to swallow her saliva or liquids; rule out esophageal obstruction secondary to radiation therapy.  2. History of large hiatal hernia and colon polyps.    RECOMMENDATIONS:    1. Agree with present management with IV fluids.  2. We will check CBC, CMP, lipase, PT, PTT, and INR in a.m.  3. We will proceed with EGD and esophageal dilatation if needed in a.m.  4. The case and plan have been discussed in detail with the patient and her family members present at the bedside.  5. The case and plan were also discussed with the patient's bedside RN, Chetan.          CANDIS RUSSELL MD      D:  03/02/2025 16:00:24     T:  03/02/2025 19:41:53     AR/MARTHA  Job #:  [id number]     Doc#:  [id number]    CC:   Dr. Pinzon  
breast cancer to cause radiation induced esophagitis. I agree with EGD and will f/u with the results.     We will recommend to continue with letrozole when she gets better.     I have independently evaluated and examined this patient today.  I have reviewed radiologic and biochemical tests on this patient. Management Plan is developed mutually with Estefania Hazel PA-C. I have reviewed above note and agree with assessment and plan. I personally performed a substantive portion of the visit including all aspects of the medical decision making.

## 2025-03-04 LAB
ALBUMIN SERPL-MCNC: 3.4 G/DL (ref 3.4–5)
ALBUMIN/GLOB SERPL: 1.2 {RATIO} (ref 1.1–2.2)
ALP SERPL-CCNC: 72 U/L (ref 40–129)
ALT SERPL-CCNC: 11 U/L (ref 10–40)
ANION GAP SERPL CALCULATED.3IONS-SCNC: 10 MMOL/L (ref 9–17)
AST SERPL-CCNC: 19 U/L (ref 15–37)
BASOPHILS # BLD: 0.05 K/UL
BASOPHILS NFR BLD: 1 % (ref 0–1)
BILIRUB SERPL-MCNC: 0.5 MG/DL (ref 0–1)
BUN SERPL-MCNC: 12 MG/DL (ref 7–20)
CALCIUM SERPL-MCNC: 8.8 MG/DL (ref 8.3–10.6)
CHLORIDE SERPL-SCNC: 100 MMOL/L (ref 99–110)
CO2 SERPL-SCNC: 20 MMOL/L (ref 21–32)
CREAT SERPL-MCNC: 0.9 MG/DL (ref 0.6–1.2)
EOSINOPHIL # BLD: 0.33 K/UL
EOSINOPHILS RELATIVE PERCENT: 7 % (ref 0–3)
ERYTHROCYTE [DISTWIDTH] IN BLOOD BY AUTOMATED COUNT: 13.9 % (ref 11.7–14.9)
GFR, ESTIMATED: 64 ML/MIN/1.73M2
GLUCOSE BLD-MCNC: 101 MG/DL (ref 74–99)
GLUCOSE BLD-MCNC: 108 MG/DL (ref 74–99)
GLUCOSE BLD-MCNC: 84 MG/DL (ref 74–99)
GLUCOSE BLD-MCNC: 98 MG/DL (ref 74–99)
GLUCOSE SERPL-MCNC: 94 MG/DL (ref 74–99)
HCT VFR BLD AUTO: 33.8 % (ref 37–47)
HGB BLD-MCNC: 11.3 G/DL (ref 12.5–16)
IMM GRANULOCYTES # BLD AUTO: 0.01 K/UL
IMM GRANULOCYTES NFR BLD: 0 %
LYMPHOCYTES NFR BLD: 0.73 K/UL
LYMPHOCYTES RELATIVE PERCENT: 16 % (ref 24–44)
MCH RBC QN AUTO: 29 PG (ref 27–31)
MCHC RBC AUTO-ENTMCNC: 33.4 G/DL (ref 32–36)
MCV RBC AUTO: 86.7 FL (ref 78–100)
MONOCYTES NFR BLD: 0.44 K/UL
MONOCYTES NFR BLD: 9 % (ref 0–4)
NEUTROPHILS NFR BLD: 67 % (ref 36–66)
NEUTS SEG NFR BLD: 3.12 K/UL
PLATELET # BLD AUTO: 230 K/UL (ref 140–440)
PMV BLD AUTO: 9.3 FL (ref 7.5–11.1)
POTASSIUM SERPL-SCNC: 4.5 MMOL/L (ref 3.5–5.1)
PROT SERPL-MCNC: 6.2 G/DL (ref 6.4–8.2)
RBC # BLD AUTO: 3.9 M/UL (ref 4.2–5.4)
SODIUM SERPL-SCNC: 130 MMOL/L (ref 136–145)
WBC OTHER # BLD: 4.7 K/UL (ref 4–10.5)

## 2025-03-04 PROCEDURE — 80053 COMPREHEN METABOLIC PANEL: CPT

## 2025-03-04 PROCEDURE — 6370000000 HC RX 637 (ALT 250 FOR IP): Performed by: FAMILY MEDICINE

## 2025-03-04 PROCEDURE — 1200000000 HC SEMI PRIVATE

## 2025-03-04 PROCEDURE — 6370000000 HC RX 637 (ALT 250 FOR IP): Performed by: STUDENT IN AN ORGANIZED HEALTH CARE EDUCATION/TRAINING PROGRAM

## 2025-03-04 PROCEDURE — 6370000000 HC RX 637 (ALT 250 FOR IP): Performed by: SPECIALIST

## 2025-03-04 PROCEDURE — 36415 COLL VENOUS BLD VENIPUNCTURE: CPT

## 2025-03-04 PROCEDURE — 94761 N-INVAS EAR/PLS OXIMETRY MLT: CPT

## 2025-03-04 PROCEDURE — 2500000003 HC RX 250 WO HCPCS: Performed by: STUDENT IN AN ORGANIZED HEALTH CARE EDUCATION/TRAINING PROGRAM

## 2025-03-04 PROCEDURE — 85025 COMPLETE CBC W/AUTO DIFF WBC: CPT

## 2025-03-04 PROCEDURE — 82962 GLUCOSE BLOOD TEST: CPT

## 2025-03-04 PROCEDURE — 6360000002 HC RX W HCPCS: Performed by: STUDENT IN AN ORGANIZED HEALTH CARE EDUCATION/TRAINING PROGRAM

## 2025-03-04 RX ORDER — OXYCODONE HYDROCHLORIDE 5 MG/1
2.5 TABLET ORAL ONCE
Status: COMPLETED | OUTPATIENT
Start: 2025-03-04 | End: 2025-03-04

## 2025-03-04 RX ADMIN — ENOXAPARIN SODIUM 40 MG: 100 INJECTION SUBCUTANEOUS at 08:40

## 2025-03-04 RX ADMIN — ACETAMINOPHEN 650 MG: 325 TABLET ORAL at 02:59

## 2025-03-04 RX ADMIN — SUCRALFATE 1 G: 1 TABLET ORAL at 11:57

## 2025-03-04 RX ADMIN — LISINOPRIL 20 MG: 20 TABLET ORAL at 08:38

## 2025-03-04 RX ADMIN — LEVOTHYROXINE SODIUM 75 MCG: 0.07 TABLET ORAL at 05:30

## 2025-03-04 RX ADMIN — SODIUM CHLORIDE, PRESERVATIVE FREE 10 ML: 5 INJECTION INTRAVENOUS at 20:37

## 2025-03-04 RX ADMIN — LETROZOLE 2.5 MG: 2.5 TABLET ORAL at 08:38

## 2025-03-04 RX ADMIN — PANTOPRAZOLE SODIUM 40 MG: 40 INJECTION, POWDER, FOR SOLUTION INTRAVENOUS at 09:54

## 2025-03-04 RX ADMIN — SUCRALFATE 1 G: 1 TABLET ORAL at 05:30

## 2025-03-04 RX ADMIN — SUCRALFATE 1 G: 1 TABLET ORAL at 17:17

## 2025-03-04 RX ADMIN — FERROUS SULFATE TAB 325 MG (65 MG ELEMENTAL FE) 325 MG: 325 (65 FE) TAB at 08:38

## 2025-03-04 RX ADMIN — OXYCODONE HYDROCHLORIDE 2.5 MG: 5 TABLET ORAL at 00:44

## 2025-03-04 RX ADMIN — SUCRALFATE 1 G: 1 TABLET ORAL at 00:12

## 2025-03-04 ASSESSMENT — PAIN DESCRIPTION - DESCRIPTORS
DESCRIPTORS: ACHING;SORE
DESCRIPTORS: ACHING;SORE

## 2025-03-04 ASSESSMENT — PAIN DESCRIPTION - ORIENTATION
ORIENTATION: LEFT;LOWER
ORIENTATION: LEFT;LOWER

## 2025-03-04 ASSESSMENT — PAIN SCALES - GENERAL
PAINLEVEL_OUTOF10: 4
PAINLEVEL_OUTOF10: 2
PAINLEVEL_OUTOF10: 0

## 2025-03-04 ASSESSMENT — PAIN DESCRIPTION - LOCATION
LOCATION: MOUTH
LOCATION: TEETH;MOUTH

## 2025-03-04 NOTE — PLAN OF CARE
Problem: Chronic Conditions and Co-morbidities  Goal: Patient's chronic conditions and co-morbidity symptoms are monitored and maintained or improved  3/4/2025 1023 by Poonam Holder LPN  Outcome: Progressing  3/4/2025 0104 by Jaylyn Hendricks RN  Outcome: Progressing  Flowsheets (Taken 3/3/2025 2029)  Care Plan - Patient's Chronic Conditions and Co-Morbidity Symptoms are Monitored and Maintained or Improved:   Monitor and assess patient's chronic conditions and comorbid symptoms for stability, deterioration, or improvement   Collaborate with multidisciplinary team to address chronic and comorbid conditions and prevent exacerbation or deterioration   Update acute care plan with appropriate goals if chronic or comorbid symptoms are exacerbated and prevent overall improvement and discharge     Problem: Discharge Planning  Goal: Discharge to home or other facility with appropriate resources  3/4/2025 1023 by Poonam oHlder LPN  Outcome: Progressing  3/4/2025 0104 by Jaylyn Hendricks RN  Outcome: Progressing  Flowsheets (Taken 3/3/2025 2029)  Discharge to home or other facility with appropriate resources:   Identify barriers to discharge with patient and caregiver   Arrange for needed discharge resources and transportation as appropriate   Identify discharge learning needs (meds, wound care, etc)   Arrange for interpreters to assist at discharge as needed   Refer to discharge planning if patient needs post-hospital services based on physician order or complex needs related to functional status, cognitive ability or social support system     Problem: Safety - Adult  Goal: Free from fall injury  3/4/2025 1023 by Poonam Holder LPN  Outcome: Progressing  3/4/2025 0104 by Jaylyn Hendricks, RN  Outcome: Progressing

## 2025-03-04 NOTE — PROCEDURES
[address] [place] [address]                             PROCEDURE NOTE      PATIENT NAME: CHELA HOWELL               : 1945  MED REC NO: [id number]                      ROOM: 4123  ACCOUNT NO: [id number]                       ADMIT DATE: 2025  PROVIDER: Marcos Hill MD      DATE OF PROCEDURE:  2025    SURGEON:  Marcos Hill MD    PROCEDURE:  Esophagogastroduodenoscopy.    PREOPERATIVE DIAGNOSIS:  History of dysphagia; rule out esophageal obstruction.    PREMEDICATION:  Please refer to the anesthesiologist's notes.    DESCRIPTION OF PROCEDURE:  The patient was placed in the left lateral decubitus position and the video Olympus gastroscope was introduced in back of the throat and was advanced into the esophagus.    Esophagus:  At the mucosa in the region of the upper esophageal sphincter was severely inflamed i.e. the mucosa was ulcerated, friable, and narrowed, and the gastroscope was advanced with some difficulty through the upper esophageal sphincter into the distal esophagus.  No obvious mass lesions were noted in the upper esophageal sphincter region.  The above changes are most likely secondary to recent radiation therapy.    The mucosa of the esophagus distal to the upper esophageal sphincter was essentially unremarkable, and no erosion, ulcers, or mass lesions were seen.  A large sliding hiatal hernia, however, was noted.  No difficulty was encountered in advancing the gastroscope through the GE junction distally into the stomach.    Stomach:  The fundus, cardia, body, antrum, lesser curvature, greater curvature, and the pyloric regions of the stomach were examined.  The mucosa of the stomach was slightly hyperemic, but no erosion or ulcers were seen.  The gastroscope was retroflexed, and the fundus and cardia was carefully examined, and no mass lesions were seen.    Duodenum:  The 1st and 2nd portions of the

## 2025-03-04 NOTE — CARE COORDINATION
Chart reviewed and met w/ patient to initiate discharge planning. CM introduced self and explained role. Patient is from home w/ her brother and sister in law. Patient has PCP and insurance. Patient reports she has a walker, stair lift and lift chair at home. Patient declined any active HHC and does not feel she will need at discharge. Patient plans to return home once medically ready and declined any needs from CM at this time. CM remains available should needs present.        03/04/25 3105   Service Assessment   Patient Orientation Alert and Oriented   Cognition Alert   History Provided By Patient   Primary Caregiver Self   Support Systems Family Members  (brother and sister in law)   Patient's Healthcare Decision Maker is: Legal Next of Kin   PCP Verified by CM Yes   Can patient return to prior living arrangement Yes   Ability to make needs known: Good   Family able to assist with home care needs: Yes   Financial Resources Medicare   Community Resources None   Social/Functional History   Lives With Family   Type of Home House   Home Equipment Walker - Rolling;Lift chair  (stair lift)   Active  No   Patient's  Info sister in law   Discharge Planning   Type of Residence House   Living Arrangements Family Members   Current Services Prior To Admission None   Potential Assistance Needed N/A   Potential Assistance Purchasing Medications No   Patient expects to be discharged to: House   Services At/After Discharge   Services At/After Discharge None   Condition of Participation: Discharge Planning   The Patient and/or Patient Representative was provided with a Choice of Provider? Patient   The Patient and/Or Patient Representative agree with the Discharge Plan? Yes

## 2025-03-05 VITALS
RESPIRATION RATE: 16 BRPM | HEIGHT: 64 IN | HEART RATE: 64 BPM | BODY MASS INDEX: 26.79 KG/M2 | TEMPERATURE: 97.9 F | SYSTOLIC BLOOD PRESSURE: 147 MMHG | DIASTOLIC BLOOD PRESSURE: 69 MMHG | WEIGHT: 156.9 LBS | OXYGEN SATURATION: 100 %

## 2025-03-05 LAB
ANION GAP SERPL CALCULATED.3IONS-SCNC: 10 MMOL/L (ref 9–17)
BUN SERPL-MCNC: 11 MG/DL (ref 7–20)
CALCIUM SERPL-MCNC: 8.7 MG/DL (ref 8.3–10.6)
CHLORIDE SERPL-SCNC: 102 MMOL/L (ref 99–110)
CO2 SERPL-SCNC: 20 MMOL/L (ref 21–32)
CREAT SERPL-MCNC: 0.8 MG/DL (ref 0.6–1.2)
ERYTHROCYTE [DISTWIDTH] IN BLOOD BY AUTOMATED COUNT: 13.7 % (ref 11.7–14.9)
GFR, ESTIMATED: 66 ML/MIN/1.73M2
GLUCOSE BLD-MCNC: 102 MG/DL (ref 74–99)
GLUCOSE BLD-MCNC: 124 MG/DL (ref 74–99)
GLUCOSE SERPL-MCNC: 104 MG/DL (ref 74–99)
HCT VFR BLD AUTO: 36.4 % (ref 37–47)
HGB BLD-MCNC: 11.5 G/DL (ref 12.5–16)
MCH RBC QN AUTO: 29.2 PG (ref 27–31)
MCHC RBC AUTO-ENTMCNC: 31.6 G/DL (ref 32–36)
MCV RBC AUTO: 92.4 FL (ref 78–100)
PLATELET # BLD AUTO: 196 K/UL (ref 140–440)
PMV BLD AUTO: 9.2 FL (ref 7.5–11.1)
POTASSIUM SERPL-SCNC: 4.4 MMOL/L (ref 3.5–5.1)
RBC # BLD AUTO: 3.94 M/UL (ref 4.2–5.4)
SODIUM SERPL-SCNC: 131 MMOL/L (ref 136–145)
WBC OTHER # BLD: 3.9 K/UL (ref 4–10.5)

## 2025-03-05 PROCEDURE — 6370000000 HC RX 637 (ALT 250 FOR IP): Performed by: SPECIALIST

## 2025-03-05 PROCEDURE — 6370000000 HC RX 637 (ALT 250 FOR IP): Performed by: STUDENT IN AN ORGANIZED HEALTH CARE EDUCATION/TRAINING PROGRAM

## 2025-03-05 PROCEDURE — 80048 BASIC METABOLIC PNL TOTAL CA: CPT

## 2025-03-05 PROCEDURE — 82962 GLUCOSE BLOOD TEST: CPT

## 2025-03-05 PROCEDURE — 85027 COMPLETE CBC AUTOMATED: CPT

## 2025-03-05 PROCEDURE — 36415 COLL VENOUS BLD VENIPUNCTURE: CPT

## 2025-03-05 PROCEDURE — 2500000003 HC RX 250 WO HCPCS: Performed by: STUDENT IN AN ORGANIZED HEALTH CARE EDUCATION/TRAINING PROGRAM

## 2025-03-05 PROCEDURE — 6360000002 HC RX W HCPCS: Performed by: STUDENT IN AN ORGANIZED HEALTH CARE EDUCATION/TRAINING PROGRAM

## 2025-03-05 RX ORDER — SUCRALFATE 1 G/1
1 TABLET ORAL 4 TIMES DAILY
Qty: 120 TABLET | Refills: 0 | Status: SHIPPED | OUTPATIENT
Start: 2025-03-05

## 2025-03-05 RX ADMIN — SUCRALFATE 1 G: 1 TABLET ORAL at 10:44

## 2025-03-05 RX ADMIN — PANTOPRAZOLE SODIUM 40 MG: 40 INJECTION, POWDER, FOR SOLUTION INTRAVENOUS at 10:44

## 2025-03-05 RX ADMIN — FERROUS SULFATE TAB 325 MG (65 MG ELEMENTAL FE) 325 MG: 325 (65 FE) TAB at 10:44

## 2025-03-05 RX ADMIN — LISINOPRIL 20 MG: 20 TABLET ORAL at 10:44

## 2025-03-05 RX ADMIN — ACETAMINOPHEN 650 MG: 325 TABLET ORAL at 00:39

## 2025-03-05 RX ADMIN — LEVOTHYROXINE SODIUM 75 MCG: 0.07 TABLET ORAL at 06:05

## 2025-03-05 RX ADMIN — LETROZOLE 2.5 MG: 2.5 TABLET ORAL at 10:43

## 2025-03-05 RX ADMIN — SODIUM CHLORIDE, PRESERVATIVE FREE 10 ML: 5 INJECTION INTRAVENOUS at 10:56

## 2025-03-05 RX ADMIN — SUCRALFATE 1 G: 1 TABLET ORAL at 06:04

## 2025-03-05 RX ADMIN — SUCRALFATE 1 G: 1 TABLET ORAL at 00:37

## 2025-03-05 RX ADMIN — ACETAMINOPHEN 650 MG: 325 TABLET ORAL at 10:48

## 2025-03-05 RX ADMIN — ENOXAPARIN SODIUM 40 MG: 100 INJECTION SUBCUTANEOUS at 10:44

## 2025-03-05 ASSESSMENT — PAIN DESCRIPTION - DESCRIPTORS
DESCRIPTORS: ACHING;NAGGING;THROBBING
DESCRIPTORS: ACHING

## 2025-03-05 ASSESSMENT — PAIN DESCRIPTION - LOCATION
LOCATION: LEG
LOCATION: LEG

## 2025-03-05 ASSESSMENT — PAIN DESCRIPTION - ORIENTATION: ORIENTATION: RIGHT;LEFT

## 2025-03-05 ASSESSMENT — PAIN SCALES - GENERAL
PAINLEVEL_OUTOF10: 4
PAINLEVEL_OUTOF10: 4

## 2025-03-05 NOTE — DISCHARGE SUMMARY
K 4.7 4.5 4.4    100 102   CO2 21 20* 20*   BUN 14 12 11   CREATININE 0.9 0.9 0.8   GLUCOSE 79 94 104*     Hepatic:   Recent Labs     03/03/25  0249 03/04/25 0245   AST 16 19   ALT 7* 11   BILITOT 0.6 0.5   ALKPHOS 67 72     Lipids:   Lab Results   Component Value Date/Time    CHOL 270 11/07/2016 09:24 AM    HDL 66 11/07/2016 09:24 AM    TRIG 193 11/07/2016 09:24 AM     Hemoglobin A1C:   Lab Results   Component Value Date/Time    LABA1C 6.1 09/29/2019 07:26 AM     TSH:   Lab Results   Component Value Date/Time    TSH 0.33 03/02/2025 10:02 AM     Troponin:   Lab Results   Component Value Date/Time    TROPONINT 0.027 07/13/2021 03:12 PM    TROPONINT 0.040 07/12/2021 11:32 AM    TROPONINT 0.091 05/11/2020 08:30 PM     Lactic Acid: No results for input(s): \"LACTA\" in the last 72 hours.  BNP: No results for input(s): \"PROBNP\" in the last 72 hours.  UA:  Lab Results   Component Value Date/Time    NITRU NEGATIVE 07/12/2021 12:30 PM    COLORU YELLOW 07/12/2021 12:30 PM    PHUR 7.0 07/12/2021 12:30 PM    WBCUA 1 07/12/2021 12:30 PM    RBCUA 2 07/12/2021 12:30 PM    MUCUS RARE 05/10/2020 01:30 PM    TRICHOMONAS NONE SEEN 07/12/2021 12:30 PM    BACTERIA RARE 07/12/2021 12:30 PM    CLARITYU CLEAR 07/12/2021 12:30 PM    LEUKOCYTESUR MODERATE 07/12/2021 12:30 PM    UROBILINOGEN NEGATIVE 07/12/2021 12:30 PM    BILIRUBINUR NEGATIVE 07/12/2021 12:30 PM    BLOODU NEGATIVE 07/12/2021 12:30 PM    GLUCOSEU NEGATIVE 07/12/2021 12:30 PM    KETUA NEGATIVE 07/12/2021 12:30 PM     Urine Cultures: No results found for: \"LABURIN\"  Blood Cultures: No results found for: \"BC\"  No results found for: \"BLOODCULT2\"  Organism: No results found for: \"ORG\"    Time Spent Discharging patient 50 minutes    Electronically signed by Mike Lockett MD on 3/5/2025 at 3:00 PM

## 2025-03-05 NOTE — PLAN OF CARE
Problem: Chronic Conditions and Co-morbidities  Goal: Patient's chronic conditions and co-morbidity symptoms are monitored and maintained or improved  3/4/2025 2258 by Smita Hannah, RN  Outcome: Progressing  3/4/2025 1023 by Poonam Holder LPN  Outcome: Progressing

## 2025-03-05 NOTE — PLAN OF CARE
Problem: Chronic Conditions and Co-morbidities  Goal: Patient's chronic conditions and co-morbidity symptoms are monitored and maintained or improved  3/5/2025 1241 by Joanne Perez, RN  Outcome: Progressing  3/4/2025 2258 by Smita Hannah, RN  Outcome: Progressing     Problem: Discharge Planning  Goal: Discharge to home or other facility with appropriate resources  Outcome: Progressing     Problem: Safety - Adult  Goal: Free from fall injury  Outcome: Progressing     Problem: Skin/Tissue Integrity  Goal: Skin integrity remains intact  Description: 1.  Monitor for areas of redness and/or skin breakdown  2.  Assess vascular access sites hourly  3.  Every 4-6 hours minimum:  Change oxygen saturation probe site  4.  Every 4-6 hours:  If on nasal continuous positive airway pressure, respiratory therapy assess nares and determine need for appliance change or resting period  Outcome: Progressing  Flowsheets (Taken 3/5/2025 1241)  Skin Integrity Remains Intact: Monitor for areas of redness and/or skin breakdown     Problem: Pain  Goal: Verbalizes/displays adequate comfort level or baseline comfort level  Outcome: Progressing

## 2025-03-05 NOTE — DISCHARGE INSTRUCTIONS
Please ensure that you follow up with your primary care physician and the gastroenterologist on discharge  Continue to take supplements and continue to eat pureed or minced meals

## 2025-03-05 NOTE — PROGRESS NOTES
V2.0    [facility] Progress Note      Name:  Dilcia Buitrago /Age/Sex: 1945  (79 y.o. female)   MRN & CSN:  [id number] & [id number] Encounter Date/Time: 3/4/2025 8:42 AM EST   Location:  [address] [place] [address] PCP: Suha Pinzon MD     Attending:Mike Lockett*       Hospital Day: 3    Assessment and Recommendations   Dilcia Buitrago is a 79 y.o. female with pmh of breast cancer, and HLD  who presents with Dysphagia      Plan:   Dysphagia  Thought to be 2/2 radiation for breast cancer  --GI following, s/p EGD that showed severely inflamed with findings that support cause as radiation  --Trial CLD, advance diet per GI recs  --Aspiration precautions  --Monitor blood glucose     Breast cancer  --S/P radiation/chemotherapy  --Oncology consult appreciated    Hyperlipidemia  --Continue statin      Diet ADULT DIET; Clear Liquid   DVT Prophylaxis [x] Lovenox, []  Heparin, [] SCDs, [] Ambulation,  [] Eliquis, [] Xarelto  [] Coumadin   Code Status Full Code   Disposition From: Home  Expected Disposition: Home  Estimated Date of Discharge: 1-2 days  Patient requires continued admission due to pending GI clearance   Surrogate Decision Maker/ POA  Brother     Personally reviewed Lab Studies and Imaging     Discussed management of the case with the IDR team     Drugs that require monitoring for toxicity include lovenox and the method of monitoring was cbc        Subjective:     Chief Complaint: Choking    Dilcia Buitrago is a 79 y.o. female who is seen and examined at bedside, NOEL, discussed POC and she is amenable.      Review of Systems:      Pertinent positives and negatives discussed in HPI    Objective:     Intake/Output Summary (Last 24 hours) at 3/4/2025 0842  Last data filed at 3/3/2025 1848  Gross per 24 hour   Intake 530 ml   Output --   Net 530 ml      Vitals:   Vitals:    25 1452 25 0114 25 0255   BP: (!) 113/55 136/65  109/72   Pulse: 80 64  60   Resp: 20 20 18 20   Temp:  98.6 °F 
4 Eyes Skin Assessment     NAME:  Dilcia Buitrago  YOB: 1945  MEDICAL RECORD NUMBER:  [id number]    The patient is being assessed for  Admission    I agree that at least one RN has performed a thorough Head to Toe Skin Assessment on the patient. ALL assessment sites listed below have been assessed.      Areas assessed by both nurses:    Head, Face, Ears, Shoulders, Back, Chest, Arms, Elbows, Hands, Sacrum. Buttock, Coccyx, Ischium, Legs. Feet and Heels, and Under Medical Devices         Does the Patient have a Wound? Yes wound(s) were present on assessment. LDA wound assessment was Initiated and completed by RN   Patient is stating that she has had the same sore on her bottom since 2020 will heal and reopen       Bharathi Prevention initiated by RN: Yes  Wound Care Orders initiated by RN: Yes    Pressure Injury (Stage 3,4, Unstageable, DTI, NWPT, and Complex wounds) if present, place Wound referral order by RN under : No    New Ostomies, if present place, Ostomy referral order under : No     Nurse 1 eSignature: Electronically signed by Lorenza Pedro LPN on 3/2/25 at 2:49 PM EST    **SHARE this note so that the co-signing nurse can place an eSignature**    Nurse 2 eSignature: Electronically signed by SAMEER GUPTA RN on 3/2/25 at 2:50 PM EST    
BS is 66, PS sent to provider for treatment orders. Hypoglycemia protocol initiated. Care continues.  
DOING BETTER ABLE TO TAKE CLD WITHOUT CHOKING NO VOMITING REFUSING SLP EVALUATION  VITALS STABLE   LABS NOTED  WILL ADVANCE TO PUREE -DYSPHAGIA  DIET ALONG WITH NUTRITIONAL SUPPLEMENTS  D/W PT AND RN JENA  
Outpatient Pharmacy Progress Note for Meds-to-Beds    Total number of Prescriptions Filled: 1    Additional Documentation:  Patient's family member picked-up the medication(s) in the OP Pharmacy      Thank you for letting us serve your patients.  [address] [address]    Phone: [phone]    Fax: [phone]        
Patient provided with dysphagia diet information and swallowing exercises information.   
Pt tolerated clear diet for dinner, Liz ordered Carafate and she tolerated crushed in applesauce. Pt is also tolerating water well. Care continues.  
 71.2 kg (156 lb 14.4 oz)     Height:  1.626 m (5' 4\")           Physical Exam:      General: NAD  Eyes: EOMI  ENT: neck supple  Cardiovascular: Regular rate.  Respiratory: Clear to auscultation  Gastrointestinal: Soft, non tender  Genitourinary: no suprapubic tenderness  Musculoskeletal: No edema  Skin: warm, dry  Neuro: Alert.  Psych: Mood appropriate.         Medications:   Medications:    ferrous sulfate  325 mg Oral Daily with breakfast    levothyroxine  75 mcg Oral QAM AC    lisinopril  20 mg Oral Daily    sodium chloride flush  5-40 mL IntraVENous 2 times per day    enoxaparin  40 mg SubCUTAneous Daily    insulin lispro  0-4 Units SubCUTAneous 4x Daily AC & HS    pantoprazole  40 mg IntraVENous Daily      Infusions:    sodium chloride      dextrose       PRN Meds: sodium chloride flush, 5-40 mL, PRN  sodium chloride, 500 mL, PRN  potassium chloride, 40 mEq, PRN   Or  potassium alternative oral replacement, 40 mEq, PRN   Or  potassium chloride, 10 mEq, PRN  magnesium sulfate, 2,000 mg, PRN  ondansetron, 4 mg, Q8H PRN   Or  ondansetron, 4 mg, Q6H PRN  polyethylene glycol, 17 g, Daily PRN  acetaminophen, 650 mg, Q6H PRN   Or  acetaminophen, 650 mg, Q6H PRN  glucose, 4 tablet, PRN  dextrose bolus, 125 mL, PRN   Or  dextrose bolus, 250 mL, PRN  glucagon (rDNA), 1 mg, PRN  dextrose, 1,000 mL, Continuous PRN        Labs and Imaging   CTA PULMONARY W CONTRAST    Result Date: 3/2/2025  EXAM:  CTA PULMONARY W CONTRAST DATE OF EXAM:  3/2/2025 11:23 DEMOGRAPHICS: 79 years old Female CLINICAL STATEMENT: breast cancer, dysphagia, on radiation, evaluate for esophageal stricture, mass or other etiology of choking IOPAMIDOL 76 % IV SOLN COMPARISON: 5/10/2020 TECHNIQUE: 3-D images were rendered in the form of Maximum Intensity Projections  (MIPs) and were reviewed. CT radiation dose optimization techniques (automated exposure control, and use of iterative reconstruction techniques, or adjustment of the mA and/or kV according 
breast lobular carcinoma    Patient to continue Letrozole daily.     GI consulted and following.  EGD yesterday found severely inflamed, ulcerated, friable and narrowed upper esophageal sphincter region.  Patient was placed on protonix and carafate.  Patient able to swallow today.  Recommend continuing medications.      Anemia most likely multifactorial.  EMILIO in the past. Resume daily ferrous sulfate. Hg stable today.  Cont to monitor CBC daily and transfuse if Hg < 7.0.     Remainder of care per primary and consulting teams.    Management Plan is developed mutually with Dr. Alirio Napoles MD.

## 2025-03-10 ENCOUNTER — TELEPHONE (OUTPATIENT)
Dept: INFUSION THERAPY | Age: 80
End: 2025-03-10

## 2025-03-10 NOTE — TELEPHONE ENCOUNTER
Spoke with pt, pt stated she has already told dr. Napoles she was not taking the medication. I then asked if he was calling in a different prescription. Pt stated \"No I told him I was not taking\"

## 2025-03-10 NOTE — TELEPHONE ENCOUNTER
I called to discuss the financial assistance for the Verzenio. The patient refuses to start taking the Verzenio and the application will be placed on hold.

## 2025-03-26 RX ORDER — LETROZOLE 2.5 MG/1
2.5 TABLET, FILM COATED ORAL DAILY
Qty: 30 TABLET | Refills: 0 | Status: SHIPPED | OUTPATIENT
Start: 2025-03-26

## 2025-03-31 ENCOUNTER — TELEPHONE (OUTPATIENT)
Dept: ONCOLOGY | Age: 80
End: 2025-03-31

## 2025-03-31 NOTE — TELEPHONE ENCOUNTER
NO SHOW LETTER   CALLED TO RS APPOINTMENT THAT WAS MISSED 03/31, WAS HUNG UP ON AND CALLED BACK AND SAME RESULT

## 2025-04-28 RX ORDER — LETROZOLE 2.5 MG/1
2.5 TABLET, FILM COATED ORAL DAILY
Qty: 30 TABLET | Refills: 0 | Status: SHIPPED | OUTPATIENT
Start: 2025-04-28

## 2025-05-30 ENCOUNTER — TELEPHONE (OUTPATIENT)
Dept: ONCOLOGY | Age: 80
End: 2025-05-30

## 2025-05-30 NOTE — TELEPHONE ENCOUNTER
Called patient to reschedule their no show appointment on 5/30 with Alirio Napoles.  Left message for patient to return call to reschedule appointment.  Sending out a NO SHOW letter.

## 2025-06-02 RX ORDER — LETROZOLE 2.5 MG/1
2.5 TABLET, FILM COATED ORAL DAILY
Qty: 30 TABLET | Refills: 0 | Status: SHIPPED | OUTPATIENT
Start: 2025-06-02

## 2025-07-01 RX ORDER — LETROZOLE 2.5 MG/1
2.5 TABLET, FILM COATED ORAL DAILY
Qty: 30 TABLET | Refills: 0 | OUTPATIENT
Start: 2025-07-01

## 2025-07-08 RX ORDER — LETROZOLE 2.5 MG/1
2.5 TABLET, FILM COATED ORAL DAILY
Qty: 30 TABLET | Refills: 0 | OUTPATIENT
Start: 2025-07-08

## (undated) DEVICE — SNARE ENDOSCP L240CM SHTH DIA24MM LOOP W10MM POLYP RND REINF

## (undated) DEVICE — GARMENT,MEDLINE,DVT,INT,CALF,MED, GEN2: Brand: MEDLINE

## (undated) DEVICE — SOLUTION IV IRRIG WATER 1000ML POUR BRL 2F7114

## (undated) DEVICE — SUTURE MCRYL SZ 4-0 L27IN ABSRB UD RB-1 L17MM 1/2 CIR Y214H

## (undated) DEVICE — Z DISCONTINUED (USE MFG CAT MVABO)  TUBING GAS SAMPLING STD 6.5 FT FEMALE CONN SMRT CAPNOLINE

## (undated) DEVICE — GOWN,SIRUS,POLYRNF,BRTHSLV,XLN/XL,20/CS: Brand: MEDLINE

## (undated) DEVICE — TUBING INSUFFLATOR HEAT HI FLO SET PNEUMOCLEAR

## (undated) DEVICE — TROCAR: Brand: KII FIOS FIRST ENTRY

## (undated) DEVICE — YANKAUER,BULB TIP,W/O VENT,RIGID,STERILE: Brand: MEDLINE

## (undated) DEVICE — AEGIS 1" DISK 4MM HOLE, PEEL OPEN: Brand: MEDLINE

## (undated) DEVICE — TOWEL,OR,DSP,ST,BLUE,STD,6/PK,12PK/CS: Brand: MEDLINE

## (undated) DEVICE — CHLORAPREP 26ML ORANGE

## (undated) DEVICE — FORCEPS BX L240CM JAW DIA2.8MM L CAP W/ NDL MIC MESH TOOTH

## (undated) DEVICE — SET IRRIG L94IN ID0.281IN W/ 4.5IN DST FLX CONN 2 LD ON OFF

## (undated) DEVICE — TUBING, SUCTION, 9/32" X 10', STRAIGHT: Brand: MEDLINE

## (undated) DEVICE — 20 ML SYRINGE LUER-LOCK TIP: Brand: MONOJECT

## (undated) DEVICE — CONTAINER,SPECIMEN,OR STERILE,4OZ: Brand: MEDLINE

## (undated) DEVICE — GLOVE SURG SZ 65 L12IN FNGR THK87MIL WHT LTX FREE

## (undated) DEVICE — Z DISCONTINUED NO SUB IDED TUBING ETCO2 AD L6.5FT NSL ORAL CVD PRNG NONFLARED TIP OVR

## (undated) DEVICE — PACK SURG LAP CHOLE

## (undated) DEVICE — STANDARD HYPODERMIC NEEDLE,POLYPROPYLENE HUB: Brand: MONOJECT

## (undated) DEVICE — TISSUE RETRIEVAL SYSTEM: Brand: INZII RETRIEVAL SYSTEM

## (undated) DEVICE — DRESSING TRNSPAR W2XL2.75IN FLM SHT SEMIPERMEABLE WIND

## (undated) DEVICE — ANESTHESIA CIRCUIT ADULT-LF: Brand: MEDLINE INDUSTRIES, INC.

## (undated) DEVICE — 3M™ STERI-STRIP™ COMPOUND BENZOIN TINCTURE 40 BAGS/CARTON 4 CARTONS/CASE C1544: Brand: 3M™ STERI-STRIP™

## (undated) DEVICE — 35 ML SYRINGE LUER-LOCK TIP: Brand: MONOJECT

## (undated) DEVICE — ENDOSCOPIC KIT 1.1+ OP4 CA DE 2 GWN AAMI LEVEL 3

## (undated) DEVICE — LINER SUCT CANSTR 1500CC SEMI RIG W/ POR HYDROPHOBIC SHUT

## (undated) DEVICE — DRESSING TRNSPAR W5XL4.5IN FLM SHT SEMIPERMEABLE WIND

## (undated) DEVICE — DRAPE SHEET ULTRAGARD: Brand: MEDLINE

## (undated) DEVICE — APPLIER CLP M L L11.4IN DIA10MM ENDOSCP ROT MULT FOR LIG

## (undated) DEVICE — LINER,SEMI-RIGID,3000CC,50EA/CS: Brand: MEDLINE

## (undated) DEVICE — SET TBNG DISP TIP FOR AHTO

## (undated) DEVICE — Z INACTIVE USE 2660664 SOLUTION IRRIG 3000ML 0.9% SOD CHL USP UROMATIC PLAS CONT

## (undated) DEVICE — SUTURE PERMAHAND SZ 2-0 L18IN NONABSORBABLE BLK L26MM FS 685G

## (undated) DEVICE — SPONGE GZ W4XL8IN COT WVN 12 PLY

## (undated) DEVICE — TROCAR: Brand: KII® SLEEVE

## (undated) DEVICE — GLOVE ORANGE PI 8   MSG9080

## (undated) DEVICE — YANKAUER,FLEXIBLE HANDLE,REGLR CAPACITY: Brand: MEDLINE INDUSTRIES, INC.

## (undated) DEVICE — GLOVE SURG SZ 65 THK91MIL LTX FREE SYN POLYISOPRENE

## (undated) DEVICE — SOLUTION IV 1000ML 0.9% SOD CHL FOR IRRIG PLAS CONT

## (undated) DEVICE — ELECTRODE ES AD CRDLSS PT RET REM POLYHESIVE

## (undated) DEVICE — STRIP SKIN CLSR W0.25XL4IN WHT SPUNBOUND FBR NYL HI ADH